# Patient Record
Sex: FEMALE | Race: BLACK OR AFRICAN AMERICAN | NOT HISPANIC OR LATINO | Employment: FULL TIME | URBAN - METROPOLITAN AREA
[De-identification: names, ages, dates, MRNs, and addresses within clinical notes are randomized per-mention and may not be internally consistent; named-entity substitution may affect disease eponyms.]

---

## 2017-08-14 ENCOUNTER — NURSE TRIAGE (OUTPATIENT)
Dept: ADMINISTRATIVE | Facility: CLINIC | Age: 47
End: 2017-08-14

## 2017-08-14 ENCOUNTER — HOSPITAL ENCOUNTER (EMERGENCY)
Facility: HOSPITAL | Age: 47
Discharge: HOME | End: 2017-08-15
Attending: EMERGENCY MEDICINE | Admitting: EMERGENCY MEDICINE
Payer: COMMERCIAL

## 2017-08-14 DIAGNOSIS — K57.92 DIVERTICULITIS OF INTESTINE, UNSPECIFIED BLEEDING STATUS, UNSPECIFIED COMPLICATION STATUS, UNSPECIFIED PART OF INTESTINAL TRACT: Primary | ICD-10-CM

## 2017-08-14 DIAGNOSIS — R10.9 FLANK PAIN: ICD-10-CM

## 2017-08-14 LAB
B-HCG UR QL: NEGATIVE
BACTERIA #/AREA URNS AUTO: ABNORMAL /HPF
BASOPHILS # BLD AUTO: 0.01 K/UL
BASOPHILS NFR BLD: 0.1 %
BILIRUB UR QL STRIP: NEGATIVE
CLARITY UR REFRACT.AUTO: ABNORMAL
COLOR UR AUTO: YELLOW
CTP QC/QA: YES
DIFFERENTIAL METHOD: ABNORMAL
EOSINOPHIL # BLD AUTO: 0 K/UL
EOSINOPHIL NFR BLD: 0.2 %
ERYTHROCYTE [DISTWIDTH] IN BLOOD BY AUTOMATED COUNT: 15.4 %
GLUCOSE UR QL STRIP: NEGATIVE
HCT VFR BLD AUTO: 40.9 %
HGB BLD-MCNC: 13.3 G/DL
HGB UR QL STRIP: NEGATIVE
KETONES UR QL STRIP: NEGATIVE
LEUKOCYTE ESTERASE UR QL STRIP: ABNORMAL
LYMPHOCYTES # BLD AUTO: 1.5 K/UL
LYMPHOCYTES NFR BLD: 7.7 %
MCH RBC QN AUTO: 28.4 PG
MCHC RBC AUTO-ENTMCNC: 32.5 G/DL
MCV RBC AUTO: 87 FL
MICROSCOPIC COMMENT: ABNORMAL
MONOCYTES # BLD AUTO: 1.1 K/UL
MONOCYTES NFR BLD: 5.5 %
NEUTROPHILS # BLD AUTO: 17 K/UL
NEUTROPHILS NFR BLD: 86.2 %
NITRITE UR QL STRIP: NEGATIVE
PH UR STRIP: 5 [PH] (ref 5–8)
PLATELET # BLD AUTO: 264 K/UL
PMV BLD AUTO: 8.7 FL
PROT UR QL STRIP: NEGATIVE
RBC # BLD AUTO: 4.68 M/UL
RBC #/AREA URNS AUTO: 1 /HPF (ref 0–4)
SP GR UR STRIP: 1.02 (ref 1–1.03)
SQUAMOUS #/AREA URNS AUTO: 8 /HPF
URN SPEC COLLECT METH UR: ABNORMAL
UROBILINOGEN UR STRIP-ACNC: NEGATIVE EU/DL
WBC # BLD AUTO: 19.65 K/UL
WBC #/AREA URNS AUTO: 21 /HPF (ref 0–5)

## 2017-08-14 PROCEDURE — 96374 THER/PROPH/DIAG INJ IV PUSH: CPT

## 2017-08-14 PROCEDURE — 96375 TX/PRO/DX INJ NEW DRUG ADDON: CPT

## 2017-08-14 PROCEDURE — 25000003 PHARM REV CODE 250: Performed by: EMERGENCY MEDICINE

## 2017-08-14 PROCEDURE — 81001 URINALYSIS AUTO W/SCOPE: CPT

## 2017-08-14 PROCEDURE — 63600175 PHARM REV CODE 636 W HCPCS: Performed by: EMERGENCY MEDICINE

## 2017-08-14 PROCEDURE — 99285 EMERGENCY DEPT VISIT HI MDM: CPT | Mod: ,,, | Performed by: EMERGENCY MEDICINE

## 2017-08-14 PROCEDURE — 81025 URINE PREGNANCY TEST: CPT | Performed by: EMERGENCY MEDICINE

## 2017-08-14 PROCEDURE — 85025 COMPLETE CBC W/AUTO DIFF WBC: CPT

## 2017-08-14 PROCEDURE — 96361 HYDRATE IV INFUSION ADD-ON: CPT

## 2017-08-14 PROCEDURE — P9612 CATHETERIZE FOR URINE SPEC: HCPCS

## 2017-08-14 PROCEDURE — 99284 EMERGENCY DEPT VISIT MOD MDM: CPT | Mod: 25

## 2017-08-14 RX ORDER — CIPROFLOXACIN 500 MG/1
500 TABLET ORAL
Status: COMPLETED | OUTPATIENT
Start: 2017-08-14 | End: 2017-08-15

## 2017-08-14 RX ORDER — HYDROMORPHONE HYDROCHLORIDE 1 MG/ML
1 INJECTION, SOLUTION INTRAMUSCULAR; INTRAVENOUS; SUBCUTANEOUS
Status: COMPLETED | OUTPATIENT
Start: 2017-08-14 | End: 2017-08-14

## 2017-08-14 RX ORDER — METRONIDAZOLE 500 MG/1
500 TABLET ORAL
Status: COMPLETED | OUTPATIENT
Start: 2017-08-14 | End: 2017-08-15

## 2017-08-14 RX ORDER — ONDANSETRON 2 MG/ML
4 INJECTION INTRAMUSCULAR; INTRAVENOUS
Status: COMPLETED | OUTPATIENT
Start: 2017-08-14 | End: 2017-08-14

## 2017-08-14 RX ORDER — KETOROLAC TROMETHAMINE 30 MG/ML
15 INJECTION, SOLUTION INTRAMUSCULAR; INTRAVENOUS
Status: COMPLETED | OUTPATIENT
Start: 2017-08-14 | End: 2017-08-14

## 2017-08-14 RX ADMIN — KETOROLAC TROMETHAMINE 15 MG: 30 INJECTION, SOLUTION INTRAMUSCULAR at 10:08

## 2017-08-14 RX ADMIN — ONDANSETRON 4 MG: 2 INJECTION INTRAMUSCULAR; INTRAVENOUS at 10:08

## 2017-08-14 RX ADMIN — HYDROMORPHONE HYDROCHLORIDE 1 MG: 1 INJECTION, SOLUTION INTRAMUSCULAR; INTRAVENOUS; SUBCUTANEOUS at 10:08

## 2017-08-14 RX ADMIN — SODIUM CHLORIDE 1000 ML: 0.9 INJECTION, SOLUTION INTRAVENOUS at 10:08

## 2017-08-14 NOTE — TELEPHONE ENCOUNTER
Reason for Disposition   No answer.  First attempt to contact caller.  Follow-up call scheduled within 15 minutes.   Message left on identified answering machine.   Second attempt to contact family AND no contact made.  Answering service notified.    Protocols used: ST NO CONTACT OR DUPLICATE CONTACT CALL-A-AH

## 2017-08-15 ENCOUNTER — TELEPHONE (OUTPATIENT)
Dept: OBSTETRICS AND GYNECOLOGY | Facility: CLINIC | Age: 47
End: 2017-08-15

## 2017-08-15 VITALS
HEIGHT: 64 IN | SYSTOLIC BLOOD PRESSURE: 121 MMHG | DIASTOLIC BLOOD PRESSURE: 69 MMHG | HEART RATE: 87 BPM | TEMPERATURE: 98 F | BODY MASS INDEX: 36.7 KG/M2 | RESPIRATION RATE: 18 BRPM | WEIGHT: 215 LBS | OXYGEN SATURATION: 100 %

## 2017-08-15 LAB
ALBUMIN SERPL BCP-MCNC: 3.3 G/DL
ALP SERPL-CCNC: 60 U/L
ALT SERPL W/O P-5'-P-CCNC: 14 U/L
ANION GAP SERPL CALC-SCNC: 10 MMOL/L
AST SERPL-CCNC: 14 U/L
BILIRUB SERPL-MCNC: 0.4 MG/DL
BUN SERPL-MCNC: 17 MG/DL
CALCIUM SERPL-MCNC: 8.4 MG/DL
CHLORIDE SERPL-SCNC: 103 MMOL/L
CO2 SERPL-SCNC: 24 MMOL/L
CREAT SERPL-MCNC: 0.7 MG/DL
EST. GFR  (AFRICAN AMERICAN): >60 ML/MIN/1.73 M^2
EST. GFR  (NON AFRICAN AMERICAN): >60 ML/MIN/1.73 M^2
GLUCOSE SERPL-MCNC: 98 MG/DL
POTASSIUM SERPL-SCNC: 3.5 MMOL/L
PROT SERPL-MCNC: 6.7 G/DL
SODIUM SERPL-SCNC: 137 MMOL/L

## 2017-08-15 PROCEDURE — 80053 COMPREHEN METABOLIC PANEL: CPT

## 2017-08-15 PROCEDURE — 25000003 PHARM REV CODE 250: Performed by: EMERGENCY MEDICINE

## 2017-08-15 RX ORDER — ONDANSETRON 4 MG/1
4 TABLET, FILM COATED ORAL EVERY 8 HOURS PRN
Qty: 12 TABLET | Refills: 0 | Status: SHIPPED | OUTPATIENT
Start: 2017-08-15 | End: 2017-08-17 | Stop reason: SDUPTHER

## 2017-08-15 RX ORDER — METRONIDAZOLE 500 MG/1
500 TABLET ORAL 3 TIMES DAILY
Qty: 30 TABLET | Refills: 0 | Status: SHIPPED | OUTPATIENT
Start: 2017-08-15 | End: 2017-08-25

## 2017-08-15 RX ORDER — CIPROFLOXACIN 500 MG/1
500 TABLET ORAL 2 TIMES DAILY
Qty: 20 TABLET | Refills: 0 | Status: SHIPPED | OUTPATIENT
Start: 2017-08-15 | End: 2017-08-25

## 2017-08-15 RX ORDER — HYDROCODONE BITARTRATE AND ACETAMINOPHEN 5; 325 MG/1; MG/1
1 TABLET ORAL EVERY 4 HOURS PRN
Qty: 18 TABLET | Refills: 0 | Status: SHIPPED | OUTPATIENT
Start: 2017-08-15 | End: 2017-11-30

## 2017-08-15 RX ADMIN — CIPROFLOXACIN HYDROCHLORIDE 500 MG: 500 TABLET, FILM COATED ORAL at 12:08

## 2017-08-15 RX ADMIN — METRONIDAZOLE 500 MG: 500 TABLET ORAL at 12:08

## 2017-08-15 NOTE — ED PROVIDER NOTES
Encounter Date: 8/14/2017    SCRIBE #1 NOTE: I, Miguelina Pink, am scribing for, and in the presence of, Dr. Huang.       History     Chief Complaint   Patient presents with    Abdominal Pain     LRQ abd pain whcih began this morning and became much worse around 5:45 tonight. Denies NVD at all today.        The patient is a 47 y.o. female with hx of: HTN and back pain that presents to the ED for evaluation of right sided lower abdominal pain radiating into the right lower back that began earlier today. Pain is worsened with movement. She states she was driving home from work when the pain began. She admits to chills and mild nausea. Denies vomiting, diarrhea, hematuria, dysuria, vaginal bleeding or discharge. Patient is unsure if she had a fever. She denies any abd surgeries in the past. Denies having similar symptoms in the past. LNMP was a week ago. No further concerns or complaints at this time.        The history is provided by the patient and medical records.     Review of patient's allergies indicates:  No Known Allergies  Past Medical History:   Diagnosis Date    Back pain     Hypertension      Past Surgical History:   Procedure Laterality Date    breast augmentation      DILATION AND CURETTAGE OF UTERUS       Family History   Problem Relation Age of Onset    Colon cancer Paternal Grandfather      Social History   Substance Use Topics    Smoking status: Never Smoker    Smokeless tobacco: Never Used    Alcohol use No     Review of Systems   Constitutional: Positive for chills.   HENT: Negative for sore throat.    Respiratory: Negative for shortness of breath.    Cardiovascular: Negative for chest pain.   Gastrointestinal: Positive for abdominal pain and nausea. Negative for diarrhea and vomiting.   Genitourinary: Positive for flank pain. Negative for dysuria, hematuria, vaginal bleeding and vaginal discharge.   Musculoskeletal: Negative for gait problem.   Skin: Negative for rash.   Neurological:  Negative for headaches.   Psychiatric/Behavioral: Negative for confusion.       Physical Exam     Initial Vitals [08/14/17 1859]   BP Pulse Resp Temp SpO2   (!) 145/82 95 18 98.3 °F (36.8 °C) 98 %      MAP       103         Physical Exam    Nursing note and vitals reviewed.  Constitutional: She appears well-developed and well-nourished. She appears distressed (moderate distress).   HENT:   Head: Normocephalic and atraumatic.   Neck: Normal range of motion.   Cardiovascular: Normal rate and regular rhythm. Exam reveals no gallop and no friction rub.    No murmur heard.  Pulmonary/Chest: Breath sounds normal. No respiratory distress. She has no wheezes. She has no rhonchi. She has no rales.   Abdominal: There is tenderness.   Right CVA tenderness. Moderate RLQ tenderness to palpation.   Musculoskeletal: Normal range of motion.   Neurological: She is alert and oriented to person, place, and time.   Skin: Skin is warm and dry.         ED Course   Procedures  Labs Reviewed   CBC W/ AUTO DIFFERENTIAL - Abnormal; Notable for the following:        Result Value    WBC 19.65 (*)     RDW 15.4 (*)     MPV 8.7 (*)     Gran # 17.0 (*)     Mono # 1.1 (*)     Gran% 86.2 (*)     Lymph% 7.7 (*)     All other components within normal limits    Narrative:     grayx is not on ice   URINALYSIS - Abnormal; Notable for the following:     Appearance, UA Cloudy (*)     Leukocytes, UA 3+ (*)     All other components within normal limits    Narrative:     urnx is GRAY top   URINALYSIS MICROSCOPIC - Abnormal; Notable for the following:     WBC, UA 21 (*)     Bacteria, UA Few (*)     All other components within normal limits    Narrative:     urnx is GRAY top   COMPREHENSIVE METABOLIC PANEL - Abnormal; Notable for the following:     Calcium 8.4 (*)     Albumin 3.3 (*)     All other components within normal limits   POCT URINE PREGNANCY          X-Rays:   Independently Interpreted Readings:   Abdomen: CT renal stone study: Sigmoid diverticulitis.      Medical Decision Making:   History:   Old Medical Records: I decided to obtain old medical records.  Initial Assessment:   Patient with right lower quadrant and flank pain. Will perform abdominal labs and CT scan and re-evaluate.  Differential Diagnosis:   Kidney stone, pyelonephritis, ovarian torsion, and appendicitis.  Clinical Tests:   Lab Tests: Ordered and Reviewed  Radiological Study: Ordered and Reviewed            Scribe Attestation:   Scribe #1: I performed the above scribed service and the documentation accurately describes the services I performed. I attest to the accuracy of the note.    Attending Attestation:           Physician Attestation for Scribe:  Physician Attestation Statement for Scribe #1: I, Dr. Huang, reviewed documentation, as scribed by Miguelina Pink in my presence, and it is both accurate and complete.         Attending ED Notes:   Patient found to have diverticulitis. Will discharge with Cipro and Flagyl.          ED Course     Clinical Impression:   The primary encounter diagnosis was Diverticulitis of intestine, unspecified bleeding status, unspecified complication status, unspecified part of intestinal tract. A diagnosis of Flank pain was also pertinent to this visit.    Disposition:   Disposition: Discharged  Condition: Stable                        Abhijeet Huang III, MD  08/15/17 8280

## 2017-08-15 NOTE — TELEPHONE ENCOUNTER
----- Message from Frances Felix sent at 8/14/2017  5:19 PM CDT -----  Contact: self, [phone]  Patient called in requesting to have medication prescribed for a UTI.  Please advise.

## 2017-08-15 NOTE — ED NOTES
Pain:abdominal pain that radiates tot he back 8/10 stabbing pain    Psychosocial: Patient is calm and cooperative. Patients insight and judgement are appropriate to situation. Appears clean, well maintained, with clothing appropriate to environment. No evidence of delusions, hallucinations, or psychosis.    Neuro: Eyes open spontaneously. Awake, alert, oriented x 4. Speech clear and appropriate. Tolerating saliva secretions well. Able to follow commands, demonstrating ability to actively and appropriately communicate within context of current conversation. Symmetrical facial muscles. Moving all extremities well with no noted weakness. Adequate muscle tone present. Movement is purposeful. No evidence of impaired sensation. Responds to external stimuli with appropriate reflexes.     Airway: Bilateral chest rise and fall. RR regular and non-labored. Air entry patent and clear x 5 lobes of the lungs. No crepitus or subcutaneous emphysema noted on palpation.     Circulatory: Skin warm, dry, and pink. Apical and radial pulses strong and regular. Capillary refill/skin blanching less than 3 seconds to distal of 4 extremities. Placed on CM in NSR without ectopy.    Abdomen: Abdomen obese, soft and non-distended. Positive normo-active bowel sounds x 4 quadrants.     Urinary: Patient reports routine urination without pain, frequency, or urgency. Voids independently. Reports urine appears lexy/yellow in color.    Extremities: No redness, heat, swelling, deformity, or pain.     Skin: Intact with no bruising/discolorations noted.

## 2017-08-15 NOTE — ED TRIAGE NOTES
47 year old female pt presents tot the ed with complaints of abdominal pain that radiates to the back  X 1 day. Pt denies any  Nausea, sob or chest pain. Pt denies any hx of abdominal surgerys or utis. Pt is awake alert and oriented x 4. Pt denies any blood in urine or stool .

## 2017-08-17 ENCOUNTER — OFFICE VISIT (OUTPATIENT)
Dept: INTERNAL MEDICINE | Facility: CLINIC | Age: 47
End: 2017-08-17
Payer: COMMERCIAL

## 2017-08-17 VITALS
WEIGHT: 216.06 LBS | BODY MASS INDEX: 39.76 KG/M2 | SYSTOLIC BLOOD PRESSURE: 121 MMHG | HEART RATE: 82 BPM | HEIGHT: 62 IN | DIASTOLIC BLOOD PRESSURE: 79 MMHG

## 2017-08-17 DIAGNOSIS — I10 ESSENTIAL HYPERTENSION: Primary | ICD-10-CM

## 2017-08-17 DIAGNOSIS — R11.0 NAUSEA: ICD-10-CM

## 2017-08-17 DIAGNOSIS — K57.90 DIVERTICULOSIS OF INTESTINE WITHOUT BLEEDING, UNSPECIFIED INTESTINAL TRACT LOCATION: ICD-10-CM

## 2017-08-17 DIAGNOSIS — Z00.00 HEALTHCARE MAINTENANCE: ICD-10-CM

## 2017-08-17 DIAGNOSIS — K59.00 CONSTIPATION, UNSPECIFIED CONSTIPATION TYPE: ICD-10-CM

## 2017-08-17 PROCEDURE — 3008F BODY MASS INDEX DOCD: CPT | Mod: S$GLB,,, | Performed by: STUDENT IN AN ORGANIZED HEALTH CARE EDUCATION/TRAINING PROGRAM

## 2017-08-17 PROCEDURE — 99203 OFFICE O/P NEW LOW 30 MIN: CPT | Mod: S$GLB,,, | Performed by: STUDENT IN AN ORGANIZED HEALTH CARE EDUCATION/TRAINING PROGRAM

## 2017-08-17 PROCEDURE — 99999 PR PBB SHADOW E&M-EST. PATIENT-LVL IV: CPT | Mod: PBBFAC,,, | Performed by: STUDENT IN AN ORGANIZED HEALTH CARE EDUCATION/TRAINING PROGRAM

## 2017-08-17 RX ORDER — ONDANSETRON 4 MG/1
4 TABLET, FILM COATED ORAL EVERY 6 HOURS PRN
Qty: 24 TABLET | Refills: 0 | Status: SHIPPED | OUTPATIENT
Start: 2017-08-17 | End: 2017-08-21 | Stop reason: SDUPTHER

## 2017-08-17 RX ORDER — CELECOXIB 200 MG/1
200 CAPSULE ORAL DAILY
COMMUNITY
Start: 2017-08-15 | End: 2017-11-06

## 2017-08-17 RX ORDER — ALBUTEROL SULFATE 90 UG/1
90 AEROSOL, METERED RESPIRATORY (INHALATION)
COMMUNITY
Start: 2017-07-24 | End: 2018-02-12 | Stop reason: SDUPTHER

## 2017-08-17 RX ORDER — POLYETHYLENE GLYCOL 3350 17 G/17G
17 POWDER, FOR SOLUTION ORAL DAILY
Qty: 238 G | Refills: 2 | Status: SHIPPED | OUTPATIENT
Start: 2017-08-17 | End: 2017-11-30

## 2017-08-17 NOTE — PATIENT INSTRUCTIONS
Diverticulitis    Some people get pouches along the wall of the colon as they get older. The pouches, called diverticuli, usually cause no symptoms. If the pouches become blocked, you can get an infection. This infection is called diverticulitis. It causes pain in your lower abdomen and fever. If not treated, it can become a serious condition, causing an abscess to form inside the pouch. The abscess may block the intestinal tract even or rupture, spreading infection throughout the abdomen.  When treatment is started early, oral antibiotics alone may be enough to cure diverticulitis. This method is tried first. But, if you don't improve or if your condition gets worse while using oral antibiotics, you may need to be admitted to the hospital for IV antibiotics. Severe cases may require surgery.  Home care  The following guidelines will help you care for yourself at home:  · During the acute illness, rest and follow your healthcare provider's instructions about diet. Sometimes you will need to follow a clear liquid diet to rest your bowel. Once your symptoms are better, you may be told to follow a low-fiber diet for some time. Include foods like:  ¨ Flake cereal, mashed potatoes, pancakes, waffles, pasta, white bread, rice, applesauce, bananas, eggs, fish, poultry, tofu, and cooked soft vegetables  · Take antibiotics exactly as instructed. Don't miss any doses or stop taking the medication, even if you feel better.  · Monitor your temperature and tell your healthcare provider if you have rising temperatures.  Preventing future attacks  Once you have an episode of diverticulitis, you are at risk for having it again. After you have recovered from this episode, you may be able to lower your risk by eating a high-fiber diet (20 gm/day to 35 gm/day of fiber). This cleans out the colon pouches that already exist and may prevent new ones from forming. Foods high in fiber include fresh fruits and edible peelings, raw or  lightly cooked vegetables, whole grain cereals and breads, dried beans and peas, and bran.  Other steps that can help prevent future attacks include:  · Take your medicines, such as antibiotics, as your healthcare provider says.  · Drink 6 to 8 glasses of water every day, unless told otherwise.  · Use a heating pad or hot water bottle to help abdominal cramping or pain.  · Begin an exercise program. Ask your healthcare provider how to get started. You can benefit from simple activities such as walking or gardening.  · Treat diarrhea with a bland diet. Start with liquids only; then slowly add fiber over time.  · Watch for changes in your bowel movements (constipation to diarrhea). Avoid constipation by eating a high fiber diet and taking a stool softener if needed.  · Get plenty of rest and sleep.  Follow-up care  Follow up with your healthcare provider as advised or sooner if you are not getting better in the next 2 days.  When to seek medical advice  Call your healthcare provider right away if any of these occur:  · Fever of 100.4°F (38°C) or higher, or as directed by your healthcare provider  · Repeated vomiting or swelling of the abdomen  · Weakness, dizziness, light-headedness  · Pain in your abdomen that gets worse, severe, or spreads to your back  · Pain that moves to the right lower abdomen  · Rectal bleeding (stools that are red, black or maroon color)  · Unexpected vaginal bleeding  Date Last Reviewed: 9/1/2016  © 7399-9871 [employer]. [address] [place] [address], [place] [address]. All rights reserved. This information is not intended as a substitute for professional medical care. Always follow your healthcare professional's instructions.

## 2017-08-17 NOTE — PROGRESS NOTES
Internal Medicine Clinic Note  08/17/2017    Subjective:       Patient ID: Nadine M Claude is a 47 y.o. female being seen to establish care    Chief Complaint: Establish Care    HPI   Pt is a 48 y/o F with HTN, bronchitis, heel spurs, diverticulosis (w/ acute diverticulitis on 8/14/17) who presents to establish care.   She originally presented to ER on 8/14 with acute RLQ abdominal pain radiating to the back. On CT renal stone study, she was noted to have sigmoid diverticulitis. She was sent home with cipro, flagyl, zofran for nausea, norco 5-325 for pain control. Her abdominal pain has resolved today, although she has taken 1 dose of norco today.   Of note, she complains of constipation and nausea. The constipation - last BM on Sunday. Tried some colace yesterday but on BM so far. No h/o constipation in the past before diverticulosis episode.  Nausea - onset today. No emesis yet. Last zofran dose taken yesterday afternoon. She didn't take any last night b/c didn't need. She has been able to tolerate food and has eaten jellos, and half a banana for lunch.  Pt is morbidly obese and does not exercise or eat well. Will need be addressed at another office visit.   Of note, she has a h/o depression for which she took Bupropion in the past.   She also has a h/o heel spurs for which a Podiatrist has prescribed celebrex (pt hasn't picked it up yet) after she failed a trial of methylprednisolone.   She has chronic sleep disturbance for which she takes Ambien 5mg daily.  Her prior PCP was Dr. Mirza Lind in [facility]. Pt wishes to transition her medical care to [facility].     Past Medical History:   Diagnosis Date    Back pain     Hypertension      Past Surgical History:   Procedure Laterality Date    breast augmentation      DILATION AND CURETTAGE OF UTERUS       Family History   Problem Relation Age of Onset    Colon cancer Paternal Grandfather     Diabetes Mother     Heart disease Father      Patient's Medications   New  Prescriptions    POLYETHYLENE GLYCOL (GLYCOLAX) 17 GRAM/DOSE POWDER    Take 17 g by mouth once daily.   Previous Medications    ASPIRIN-ACETAMINOPHEN-CAFFEINE 250-250-65 MG (EXCEDRIN MIGRAINE) 250-250-65 MG PER TABLET    Take 1 tablet by mouth every 6 (six) hours as needed for Pain.    CELECOXIB (CELEBREX) 200 MG CAPSULE    Take 200 mg by mouth once daily.    CIPROFLOXACIN HCL (CIPRO) 500 MG TABLET    Take 1 tablet (500 mg total) by mouth 2 (two) times daily.    HYDROCODONE-ACETAMINOPHEN 5-325MG (NORCO) 5-325 MG PER TABLET    Take 1 tablet by mouth every 4 (four) hours as needed for Pain.    LOSARTAN-HYDROCHLOROTHIAZIDE 50-12.5 MG (HYZAAR) 50-12.5 MG PER TABLET    Take 1 tablet by mouth once daily.      METRONIDAZOLE (FLAGYL) 500 MG TABLET    Take 1 tablet (500 mg total) by mouth 3 (three) times daily.    PROAIR HFA 90 MCG/ACTUATION INHALER    Inhale 90 mcg into the lungs as needed.    ZOLPIDEM (AMBIEN) 10 MG TAB    Take 5 mg by mouth nightly as needed.     Modified Medications    Modified Medication Previous Medication    ONDANSETRON (ZOFRAN) 4 MG TABLET ondansetron (ZOFRAN) 4 MG tablet       Take 1 tablet (4 mg total) by mouth every 6 (six) hours as needed for Nausea.    Take 1 tablet (4 mg total) by mouth every 8 (eight) hours as needed.   Discontinued Medications    No medications on file     Patient Active Problem List    Diagnosis Date Noted    Diverticulosis 08/17/2017    Essential hypertension 08/17/2017     Review of Systems   Constitutional: Negative for chills, diaphoresis, fever and malaise/fatigue.   Eyes: Negative for blurred vision.   Respiratory: Negative for cough and shortness of breath.    Cardiovascular: Positive for leg swelling (intermittent; resolves with leg elevation). Negative for chest pain and palpitations.   Gastrointestinal: Positive for constipation and nausea. Negative for abdominal pain, diarrhea, heartburn and vomiting.   Genitourinary: Negative for dysuria.   Musculoskeletal:  "Negative for myalgias.   Neurological: Negative for headaches.     Objective:      /79   Pulse 82   Ht 5' 2" (1.575 m)   Wt 98 kg (216 lb 0.8 oz)   BMI 39.52 kg/m²   Body mass index is 39.52 kg/m².    Physical Exam   Constitutional: She is oriented to person, place, and time. She appears well-developed and well-nourished.   HENT:   Head: Normocephalic and atraumatic.   Eyes: Pupils are equal, round, and reactive to light.   Cardiovascular: Normal rate, regular rhythm, normal heart sounds and intact distal pulses.    No murmur heard.  Pulmonary/Chest: Effort normal and breath sounds normal. No respiratory distress.   Abdominal: Soft. Bowel sounds are normal. She exhibits distension. She exhibits no mass. There is no tenderness. There is no guarding.   Musculoskeletal: She exhibits no edema.   Neurological: She is alert and oriented to person, place, and time.   Skin: Skin is warm and dry. Capillary refill takes less than 2 seconds.   Psychiatric: She has a normal mood and affect. Her behavior is normal. Judgment and thought content normal.   Vitals reviewed.      Assessment:         1. Essential hypertension     2. Diverticulosis of intestine without bleeding, unspecified intestinal tract location  ondansetron (ZOFRAN) 4 MG tablet   3. Nausea     4. Constipation, unspecified constipation type  polyethylene glycol (GLYCOLAX) 17 gram/dose powder   5. Healthcare maintenance  Lipid panel    Mammo Digital Diagnostic Bilat with CAD         Plan:     Nadine M Claude is a 47 y.o. female being seen to establish care    1. Essential hypertension  - continue hyzaar at current dose  - BP controlled. Goal < 150/90    2. Diverticulosis  - pain has resolved. Advised pt to stop taking norco 5-325. May take NSAID for analgesia  - patient education provided on preventing further relapses  - will continue taking cipro & flagyl as prescribed. If she continues to feel bad, may consider changing to augmentin next week.    3. " Nausea  - likely 2/2 constipation  - refilled prescription for zofran 4mg q6h prn for nausea    4. Constipation - last BM on 8/16  - likely exacerbated by decreased oral intake and frequent opioid use (norco 5-325mg q4h) for abdominal pain  - prescribed miralax   - advised fiber intake to promote regular BMs 1/day    5. Healthcare maintenance  - lipid pane; future  - MMG ordered    RTC in 1 week to follow up for nausea, constipation, abdominal pain     Patient seen and plan of care discussed with Dr. Michelle Mills MD  Internal Medicine, PGY-2  [phone]

## 2017-08-18 NOTE — PROGRESS NOTES
I have personally interviewed the patient, examined the patient and I agree with the resident's exam, assessment and plan.   Her  accompanies her to this visit.  On my exam she is tender without rebound tenderness in her lower abdomen.  Oddly, her tenderness on exam seems to be more prominent in the right lower quadrant of the abdomen, than it is on the left side of her abdomen.  She looks mildly ill, her temperature is 98.4, her lips are slightly dry and she complains of constipation and very severe nausea.  Hopefully, the constipation and nausea are consequence of her hydrocodone use since her hospital discharge from the emergency room 3 days ago.  She last took a hydrocodone tablet this morning.  She is asked to stop hydrocodone.  She is instructed to return to the emergency room should she develop vomiting, fever or worsening abdominal pain.  Otherwise, if her nausea does not subside or she has other less worrisome symptoms she is instructed to call the office to discuss her clinical status.  In the meantime, she says that the sound of having some mashed potatoes and fish sounds pretty good and she will continue on Cipro twice daily metronidazole 3 times a day with Zofran if needed for nausea.  I think Dr. Mills's idea of using MiraLAX 1 tablespoon in 8-16 ounces of any noncarbonated drink of her choice, 2-3 times daily until her stool is formed but soft with no straining on defecation will likely work well.    Long-term, she would benefit from fiber supplementation so that she has a complete elimination on a daily basis.  We did not discuss colonoscopy, however when she recovers fully from this illness, she should consider this important screening test for the early detection of colon cancer even though she has no family history of colon cancer.

## 2017-08-21 ENCOUNTER — OFFICE VISIT (OUTPATIENT)
Dept: INTERNAL MEDICINE | Facility: CLINIC | Age: 47
End: 2017-08-21
Payer: COMMERCIAL

## 2017-08-21 VITALS
WEIGHT: 216.25 LBS | HEIGHT: 64 IN | SYSTOLIC BLOOD PRESSURE: 117 MMHG | DIASTOLIC BLOOD PRESSURE: 65 MMHG | HEART RATE: 95 BPM | BODY MASS INDEX: 36.92 KG/M2

## 2017-08-21 DIAGNOSIS — K21.9 GASTROESOPHAGEAL REFLUX DISEASE WITHOUT ESOPHAGITIS: ICD-10-CM

## 2017-08-21 DIAGNOSIS — E66.9 OBESITY (BMI 30-39.9): ICD-10-CM

## 2017-08-21 DIAGNOSIS — I10 ESSENTIAL HYPERTENSION: ICD-10-CM

## 2017-08-21 DIAGNOSIS — K57.90 DIVERTICULOSIS OF INTESTINE WITHOUT BLEEDING, UNSPECIFIED INTESTINAL TRACT LOCATION: Primary | ICD-10-CM

## 2017-08-21 PROCEDURE — 99999 PR PBB SHADOW E&M-EST. PATIENT-LVL III: CPT | Mod: PBBFAC,,, | Performed by: STUDENT IN AN ORGANIZED HEALTH CARE EDUCATION/TRAINING PROGRAM

## 2017-08-21 PROCEDURE — 99213 OFFICE O/P EST LOW 20 MIN: CPT | Mod: S$GLB,,, | Performed by: STUDENT IN AN ORGANIZED HEALTH CARE EDUCATION/TRAINING PROGRAM

## 2017-08-21 PROCEDURE — 3008F BODY MASS INDEX DOCD: CPT | Mod: S$GLB,,, | Performed by: STUDENT IN AN ORGANIZED HEALTH CARE EDUCATION/TRAINING PROGRAM

## 2017-08-21 RX ORDER — ONDANSETRON 4 MG/1
4 TABLET, FILM COATED ORAL EVERY 6 HOURS PRN
Qty: 10 TABLET | Refills: 0 | Status: SHIPPED | OUTPATIENT
Start: 2017-08-21 | End: 2017-11-30

## 2017-08-21 NOTE — PATIENT INSTRUCTIONS
Exercise 30-40 mins 3-4 times per week    [employer] Fitness Pal OR Lose IT  - record food     Nexium - for reflux

## 2017-08-21 NOTE — PROGRESS NOTES
Internal Medicine Clinic Note  08/21/2017    Subjective:       Patient ID: Nadine M Claude is a 47 y.o. female being seen for follow up    Chief Complaint: Follow-up    HPI   Pt is a 48 y/o F with HTN, bronchitis, heel spurs, diverticulosis (w/ acute diverticulitis on 8/14/17) who for follow up for her diverticulitis.   Pt originally presented to ER on 8/14 with acute RLQ abdominal pain radiating to the back. On CT renal stone study, she was noted to have sigmoid diverticulitis. She was sent home with cipro, flagyl, zofran for nausea, norco 5-325 for pain control. On last clinic visit 8/17, she was noted to be in severe nausea and constipation. The constipation was secondary to the norco (scheduled q4h) and nausea secondary to constipation +/- antibiotics. Pt was advised to stop taking norco, and prescribed zofran and miralax for symptom resolution.     In clinic today, she denies any abdominal pain, constipation. She has mild nausea after she takes her antibiotics but has been taking 1 zofran/day for it. She is now having 2-3 loose stools during the day. Continues to take miralax.   Able to tolerate meals w/o issues.     Pt is morbidly obese and does not exercise or eat well. Has a membership at [email or web address]. Limitations to exercise : heel spurs. Can ride bike. Elliptical hurts knees. Encouraged pt to ride bike 30-40 mins 3-4 times/week.   For food - has been trying to lose weight before.    Has h/o depression for which she took Bupropion in the past.   Has h/o heel spurs for which a Podiatrist has prescribed celebrex (pt hasn't picked it up yet) after she failed a trial of methylprednisolone.   She has chronic sleep disturbance for which she takes Ambien 5mg daily.    Past Medical History:   Diagnosis Date    Back pain     Hypertension      Past Surgical History:   Procedure Laterality Date    breast augmentation      DILATION AND CURETTAGE OF UTERUS       Family History   Problem Relation Age of Onset     Colon cancer Paternal Grandfather     Diabetes Mother     Heart disease Father      Patient's Medications   New Prescriptions    No medications on file   Previous Medications    ASPIRIN-ACETAMINOPHEN-CAFFEINE 250-250-65 MG (EXCEDRIN MIGRAINE) 250-250-65 MG PER TABLET    Take 1 tablet by mouth every 6 (six) hours as needed for Pain.    CELECOXIB (CELEBREX) 200 MG CAPSULE    Take 200 mg by mouth once daily.    CIPROFLOXACIN HCL (CIPRO) 500 MG TABLET    Take 1 tablet (500 mg total) by mouth 2 (two) times daily.    HYDROCODONE-ACETAMINOPHEN 5-325MG (NORCO) 5-325 MG PER TABLET    Take 1 tablet by mouth every 4 (four) hours as needed for Pain.    LOSARTAN-HYDROCHLOROTHIAZIDE 50-12.5 MG (HYZAAR) 50-12.5 MG PER TABLET    Take 1 tablet by mouth once daily.      METRONIDAZOLE (FLAGYL) 500 MG TABLET    Take 1 tablet (500 mg total) by mouth 3 (three) times daily.    POLYETHYLENE GLYCOL (GLYCOLAX) 17 GRAM/DOSE POWDER    Take 17 g by mouth once daily.    PROAIR HFA 90 MCG/ACTUATION INHALER    Inhale 90 mcg into the lungs as needed.    ZOLPIDEM (AMBIEN) 10 MG TAB    Take 5 mg by mouth nightly as needed.     Modified Medications    Modified Medication Previous Medication    ONDANSETRON (ZOFRAN) 4 MG TABLET ondansetron (ZOFRAN) 4 MG tablet       Take 1 tablet (4 mg total) by mouth every 6 (six) hours as needed for Nausea.    Take 1 tablet (4 mg total) by mouth every 6 (six) hours as needed for Nausea.   Discontinued Medications    No medications on file     Patient Active Problem List    Diagnosis Date Noted    Gastroesophageal reflux disease 08/21/2017    Obesity (BMI 30-39.9) 08/21/2017    Diverticulosis 08/17/2017    Essential hypertension 08/17/2017     Review of Systems   Constitutional: Negative for chills, diaphoresis, fever and malaise/fatigue.   Eyes: Negative for blurred vision.   Respiratory: Negative for cough and shortness of breath.    Cardiovascular: Negative for chest pain, palpitations and leg swelling.  "  Gastrointestinal: Positive for diarrhea and nausea ( improved; s/p antibiotics). Negative for abdominal pain, constipation, heartburn and vomiting.   Genitourinary: Negative for dysuria.   Musculoskeletal: Negative for myalgias.   Neurological: Negative for headaches.     Objective:      /65   Pulse 95   Ht 5' 4" (1.626 m)   Wt 98.1 kg (216 lb 4.3 oz)   BMI 37.12 kg/m²   Body mass index is 37.12 kg/m².    Physical Exam   Constitutional: She is oriented to person, place, and time. She appears well-developed and well-nourished.   HENT:   Head: Normocephalic and atraumatic.   Eyes: Pupils are equal, round, and reactive to light.   Cardiovascular: Normal rate, regular rhythm, normal heart sounds and intact distal pulses.    No murmur heard.  Pulmonary/Chest: Effort normal and breath sounds normal. No respiratory distress.   Abdominal: Soft. Bowel sounds are normal. She exhibits no distension and no mass. There is no tenderness. There is no guarding.   Musculoskeletal: She exhibits no edema.   Neurological: She is alert and oriented to person, place, and time.   Skin: Skin is warm and dry. Capillary refill takes less than 2 seconds.   Psychiatric: She has a normal mood and affect. Her behavior is normal. Judgment and thought content normal.   Vitals reviewed.      Assessment:         1. Diverticulosis of intestine without bleeding, unspecified intestinal tract location  ondansetron (ZOFRAN) 4 MG tablet   2. Essential hypertension     3. Gastroesophageal reflux disease     4. Obesity (BMI 30-39.9)           Plan:     Nadine M Claude is a 47 y.o. female being seen to follow up for her diverticulitis    1. Diverticulosis  Nausea - resolved  Constipation - resolved. Pt having loose and less formed stools. Asked pt to take   - pain has resolved. Patient has stopped taking norco  - patient education reviewed. Pt understands diet to prevent further relapses  - will continue taking cipro & flagyl as prescribed to " complete x10D course. Nausea prn for antibiotics-associated nausea    2. Essential hypertension  - continue hyzaar at current dose  - BP controlled. Goal < 150/90    3. GERD  - trial of PPI: nexium 20mg OTC    4. Obesity   - discussed diet monitoring ([employer]itness Pal vs. Lose It).   Will work on BMR calculation & daily calorie intake req to lose weight at next visit.  - discussed exercise options. Pt will ride stationary bike 30-40 mins/day 3-4 times/week  - will follow up lipid panel and continue implementing lifestyle changes.     RTC in 6 months to follow up.     Patient seen and plan of care discussed with Dr. Amie Mills MD  Internal Medicine, PGY-2  [phone]

## 2017-08-23 NOTE — PROGRESS NOTES
I have personally taken the history and examined this patient and agree with the resident's note as stated above with the following thoughts:    Discussed diet and weight loss-- depending how that works- might be good idea to do [place] [employer] referral next time.

## 2017-08-31 ENCOUNTER — OFFICE VISIT (OUTPATIENT)
Dept: URGENT CARE | Facility: CLINIC | Age: 47
End: 2017-08-31
Payer: OTHER GOVERNMENT

## 2017-08-31 VITALS
WEIGHT: 212 LBS | HEART RATE: 91 BPM | TEMPERATURE: 97 F | SYSTOLIC BLOOD PRESSURE: 140 MMHG | BODY MASS INDEX: 36.19 KG/M2 | HEIGHT: 64 IN | OXYGEN SATURATION: 98 % | DIASTOLIC BLOOD PRESSURE: 98 MMHG

## 2017-08-31 DIAGNOSIS — S63.614A SPRAIN OF RIGHT RING FINGER, UNSPECIFIED SITE OF FINGER, INITIAL ENCOUNTER: ICD-10-CM

## 2017-08-31 DIAGNOSIS — M79.644 PAIN IN RIGHT FINGER(S): Primary | ICD-10-CM

## 2017-08-31 DIAGNOSIS — S63.616A SPRAIN OF RIGHT LITTLE FINGER, UNSPECIFIED SITE OF FINGER, INITIAL ENCOUNTER: ICD-10-CM

## 2017-08-31 PROCEDURE — 99203 OFFICE O/P NEW LOW 30 MIN: CPT | Mod: S$GLB,,, | Performed by: NURSE PRACTITIONER

## 2017-08-31 PROCEDURE — 3008F BODY MASS INDEX DOCD: CPT | Mod: S$GLB,,, | Performed by: NURSE PRACTITIONER

## 2017-08-31 NOTE — PROGRESS NOTES
Subjective:       Patient ID: Nadine M Claude is a 47 y.o. female.    Chief Complaint: Hand Pain     presents with new injury (doi 8/30): She reports while placing two heavy parcels on a counter top, her right 4th and 5th fingers got caught between the boxes causing her fingers to hyperextend when she put boxes down. She c/o pain in 4th and 5th fingers when she uses or bumps her hand 5/10 on pain scale.      Hand Pain    The incident occurred 12 to 24 hours ago. The incident occurred at work. The pain is present in the right fingers. The pain does not radiate. The pain is at a severity of 5/10. The pain is moderate. The pain has been intermittent since the incident. Pertinent negatives include no chest pain. The symptoms are aggravated by palpation.     Review of Systems   Constitution: Negative for chills and fever.   HENT: Negative for headaches and sore throat.    Eyes: Negative for blurred vision.   Cardiovascular: Negative for chest pain.   Respiratory: Negative for shortness of breath.    Skin: Negative for rash.   Musculoskeletal: Negative for back pain and joint pain.   Gastrointestinal: Negative for abdominal pain, diarrhea, nausea and vomiting.   Psychiatric/Behavioral: The patient is not nervous/anxious.    All other systems reviewed and are negative.      Objective:      Physical Exam   Constitutional: She is oriented to person, place, and time. She appears well-developed and well-nourished.   HENT:   Right Ear: External ear normal.   Left Ear: External ear normal.   Nose: Nose normal.   Eyes: Conjunctivae are normal.   Cardiovascular: Normal rate, regular rhythm, normal heart sounds and intact distal pulses.    Pulmonary/Chest: Effort normal and breath sounds normal.   Abdominal: Soft. Bowel sounds are normal.   Musculoskeletal: She exhibits tenderness.        Right hand: She exhibits tenderness and swelling. She exhibits normal capillary refill and no deformity.        Hands:  Neurological:  She is alert and oriented to person, place, and time.   Skin: Skin is warm and dry. Capillary refill takes less than 2 seconds.   Psychiatric: She has a normal mood and affect. Her behavior is normal.       Assessment:       1. Pain in right finger(s)    2. Sprain of right ring finger, unspecified site of finger, initial encounter    3. Sprain of right little finger, unspecified site of finger, initial encounter        Plan:            Patient Instructions: Elevated affected area, Apply ice 24-48 hours then apply heat/warm soaks, Use splint as directed, Attention not to aggravate affected area   Restrictions: Regular Duty    RTC 9/6/17  Pt takes Celebrex for unrelated issue.

## 2017-11-06 ENCOUNTER — LAB VISIT (OUTPATIENT)
Dept: LAB | Facility: HOSPITAL | Age: 47
End: 2017-11-06
Payer: COMMERCIAL

## 2017-11-06 ENCOUNTER — OFFICE VISIT (OUTPATIENT)
Dept: INTERNAL MEDICINE | Facility: CLINIC | Age: 47
End: 2017-11-06
Payer: COMMERCIAL

## 2017-11-06 VITALS
BODY MASS INDEX: 37.11 KG/M2 | HEIGHT: 64 IN | HEART RATE: 86 BPM | WEIGHT: 217.38 LBS | SYSTOLIC BLOOD PRESSURE: 112 MMHG | DIASTOLIC BLOOD PRESSURE: 68 MMHG

## 2017-11-06 DIAGNOSIS — Z01.818 PRE-OP EVALUATION: ICD-10-CM

## 2017-11-06 DIAGNOSIS — I10 ESSENTIAL HYPERTENSION: ICD-10-CM

## 2017-11-06 DIAGNOSIS — Z01.818 PRE-OP EVALUATION: Primary | ICD-10-CM

## 2017-11-06 LAB
ALBUMIN SERPL BCP-MCNC: 3.5 G/DL
ALP SERPL-CCNC: 73 U/L
ALT SERPL W/O P-5'-P-CCNC: 19 U/L
ANION GAP SERPL CALC-SCNC: 9 MMOL/L
AST SERPL-CCNC: 18 U/L
BASOPHILS # BLD AUTO: 0.02 K/UL
BASOPHILS NFR BLD: 0.2 %
BILIRUB SERPL-MCNC: 0.1 MG/DL
BILIRUB UR QL STRIP: NEGATIVE
BUN SERPL-MCNC: 17 MG/DL
CALCIUM SERPL-MCNC: 9.5 MG/DL
CHLORIDE SERPL-SCNC: 101 MMOL/L
CLARITY UR REFRACT.AUTO: CLEAR
CO2 SERPL-SCNC: 28 MMOL/L
COLOR UR AUTO: YELLOW
CREAT SERPL-MCNC: 0.8 MG/DL
DIFFERENTIAL METHOD: ABNORMAL
EOSINOPHIL # BLD AUTO: 0.1 K/UL
EOSINOPHIL NFR BLD: 1.2 %
ERYTHROCYTE [DISTWIDTH] IN BLOOD BY AUTOMATED COUNT: 15.5 %
EST. GFR  (AFRICAN AMERICAN): >60 ML/MIN/1.73 M^2
EST. GFR  (NON AFRICAN AMERICAN): >60 ML/MIN/1.73 M^2
GLUCOSE SERPL-MCNC: 83 MG/DL
GLUCOSE UR QL STRIP: NEGATIVE
HCT VFR BLD AUTO: 38.8 %
HGB BLD-MCNC: 12.3 G/DL
HGB UR QL STRIP: NEGATIVE
KETONES UR QL STRIP: NEGATIVE
LEUKOCYTE ESTERASE UR QL STRIP: ABNORMAL
LYMPHOCYTES # BLD AUTO: 3 K/UL
LYMPHOCYTES NFR BLD: 30.1 %
MCH RBC QN AUTO: 28 PG
MCHC RBC AUTO-ENTMCNC: 31.7 G/DL
MCV RBC AUTO: 88 FL
MICROSCOPIC COMMENT: NORMAL
MONOCYTES # BLD AUTO: 0.7 K/UL
MONOCYTES NFR BLD: 6.8 %
NEUTROPHILS # BLD AUTO: 6.1 K/UL
NEUTROPHILS NFR BLD: 61.2 %
NITRITE UR QL STRIP: NEGATIVE
NRBC BLD-RTO: 0 /100 WBC
PH UR STRIP: 5 [PH] (ref 5–8)
PLATELET # BLD AUTO: 369 K/UL
PMV BLD AUTO: 8.9 FL
POTASSIUM SERPL-SCNC: 3.9 MMOL/L
PROT SERPL-MCNC: 7.4 G/DL
PROT UR QL STRIP: NEGATIVE
RBC # BLD AUTO: 4.4 M/UL
RBC #/AREA URNS AUTO: 0 /HPF (ref 0–4)
SODIUM SERPL-SCNC: 138 MMOL/L
SP GR UR STRIP: 1.02 (ref 1–1.03)
SQUAMOUS #/AREA URNS AUTO: 1 /HPF
URN SPEC COLLECT METH UR: ABNORMAL
UROBILINOGEN UR STRIP-ACNC: NEGATIVE EU/DL
WBC # BLD AUTO: 10.01 K/UL
WBC #/AREA URNS AUTO: 2 /HPF (ref 0–5)

## 2017-11-06 PROCEDURE — 81001 URINALYSIS AUTO W/SCOPE: CPT

## 2017-11-06 PROCEDURE — 80053 COMPREHEN METABOLIC PANEL: CPT

## 2017-11-06 PROCEDURE — 36415 COLL VENOUS BLD VENIPUNCTURE: CPT

## 2017-11-06 PROCEDURE — 99213 OFFICE O/P EST LOW 20 MIN: CPT | Mod: S$GLB,,, | Performed by: STUDENT IN AN ORGANIZED HEALTH CARE EDUCATION/TRAINING PROGRAM

## 2017-11-06 PROCEDURE — 99999 PR PBB SHADOW E&M-EST. PATIENT-LVL III: CPT | Mod: PBBFAC,,, | Performed by: STUDENT IN AN ORGANIZED HEALTH CARE EDUCATION/TRAINING PROGRAM

## 2017-11-06 PROCEDURE — 85025 COMPLETE CBC W/AUTO DIFF WBC: CPT

## 2017-11-06 NOTE — PROGRESS NOTES
Internal Medicine Clinic Note  11/06/2017    Subjective:       Patient ID: Nadine M Claude is a 47 y.o. female being seen for follow up.    Chief Complaint: Follow-up    HPI   Ms. Claude is a 46 y/o F with HTN, chronic back pain, depression and heel spurs who presents for follow up. She has a chronic history of heels spurs diagnosed approx 7 years ago and being followed by Dr. Keo Leyva. After years of medical therapy, it has been decided to pursue surgical option (laser) on the day after Thanksgiving. Pt presents for required pre-op eval by her Podiatrist: CBC, CMP, UA, ECG.     Symptomatically, she has left foot pain that limits her from using the elliptical for long periods of time. She has been exercising ~15 mins on stationary bike & ~30 mins on treadmill at 3.5 mph x30 mins. She denies getting any chest pain or shortness of breath with exertion. She has had surgery before and denies any complications with anesthesia. Since last seen in clinic (Aug '17), her diverticulosis has resolved.    Past Medical History:   Diagnosis Date    Back pain     Depression     GERD (gastroesophageal reflux disease)     Hypertension      Past Surgical History:   Procedure Laterality Date    breast augmentation      DILATION AND CURETTAGE OF UTERUS       Patient's Medications   New Prescriptions    No medications on file   Previous Medications    ASPIRIN-ACETAMINOPHEN-CAFFEINE 250-250-65 MG (EXCEDRIN MIGRAINE) 250-250-65 MG PER TABLET    Take 1 tablet by mouth every 6 (six) hours as needed for Pain.    HYDROCODONE-ACETAMINOPHEN 5-325MG (NORCO) 5-325 MG PER TABLET    Take 1 tablet by mouth every 4 (four) hours as needed for Pain.    LOSARTAN-HYDROCHLOROTHIAZIDE 50-12.5 MG (HYZAAR) 50-12.5 MG PER TABLET    Take 1 tablet by mouth once daily.      ONDANSETRON (ZOFRAN) 4 MG TABLET    Take 1 tablet (4 mg total) by mouth every 6 (six) hours as needed for Nausea.    POLYETHYLENE GLYCOL (GLYCOLAX) 17 GRAM/DOSE POWDER    Take 17  "g by mouth once daily.    PROAIR HFA 90 MCG/ACTUATION INHALER    Inhale 90 mcg into the lungs as needed.    ZOLPIDEM (AMBIEN) 10 MG TAB    Take 5 mg by mouth nightly as needed.     Modified Medications    No medications on file   Discontinued Medications    CELECOXIB (CELEBREX) 200 MG CAPSULE    Take 200 mg by mouth once daily.     Patient Active Problem List    Diagnosis Date Noted    Gastroesophageal reflux disease 08/21/2017    Obesity (BMI 30-39.9) 08/21/2017    Diverticulosis 08/17/2017    Essential hypertension 08/17/2017     Review of Systems   Constitutional: Negative for chills, fever and weight loss.   HENT: Negative for congestion, hearing loss, sore throat and tinnitus.    Eyes: Negative for blurred vision and double vision.   Respiratory: Negative for cough, sputum production, shortness of breath and wheezing.    Cardiovascular: Negative for chest pain, palpitations, claudication and leg swelling.   Gastrointestinal: Negative for abdominal pain, constipation, diarrhea, heartburn, nausea and vomiting.   Genitourinary: Negative for dysuria, frequency, hematuria and urgency.   Musculoskeletal: Negative for back pain, joint pain and myalgias.        Left foot pain   Skin: Negative for rash.   Neurological: Negative for dizziness, tingling, weakness and headaches.   Psychiatric/Behavioral: Negative for depression. The patient is not nervous/anxious.      Objective:      /68 (BP Location: Left arm, Patient Position: Sitting, BP Method: Medium (Automatic))   Pulse 86   Ht 5' 4" (1.626 m)   Wt 98.6 kg (217 lb 6 oz)   BMI 37.31 kg/m²   Body mass index is 37.31 kg/m².    Physical Exam   Constitutional: She is oriented to person, place, and time. She appears well-developed and well-nourished.   HENT:   Head: Normocephalic and atraumatic.   Eyes: Pupils are equal, round, and reactive to light.   Neck: Normal range of motion. No JVD present.   Cardiovascular: Normal rate, regular rhythm, normal heart " sounds and intact distal pulses.  Exam reveals no gallop and no friction rub.    No murmur heard.  Pulmonary/Chest: Effort normal. No respiratory distress. She has no wheezes. She has no rales.   Abdominal: Soft. Bowel sounds are normal. She exhibits no distension. There is no tenderness.   Musculoskeletal: Normal range of motion.   Lymphadenopathy:     She has no cervical adenopathy.   Neurological: She is alert and oriented to person, place, and time.   Skin: Skin is warm and dry. Capillary refill takes less than 2 seconds.   Psychiatric: She has a normal mood and affect.   Nursing note and vitals reviewed.      Assessment:       1. Pre-op evaluation  Comprehensive metabolic panel    CBC auto differential    URINALYSIS    IN OFFICE EKG 12-LEAD (to [place])   2. Essential hypertension  Comprehensive metabolic panel    CBC auto differential    URINALYSIS    IN OFFICE EKG 12-LEAD (to [place])      Plan:         Nadine M Claude is a 47 y.o. female being seen for pre-op evaluation    1. Pre-op evaluation  2. Hypertension  - per request for Dr. Keo Leyva (Podiatrist at [facility]), following labs ordered: CBC, CMP, UA, ECG  - after reviewing, will fax labs to [phone]  - patient has RCRI 0 which equates to 0.4% risk for major adverse cardiac events for non-invasive laser surgery for heel spurs.    RTC in 3 months for annual check up    Patient seen and plan of care discussed with Dr. Amie Mills MD  Internal Medicine, PGY-2  [phone]

## 2017-11-09 ENCOUNTER — TELEPHONE (OUTPATIENT)
Dept: OBSTETRICS AND GYNECOLOGY | Facility: CLINIC | Age: 47
End: 2017-11-09

## 2017-11-09 DIAGNOSIS — Z30.9 ENCOUNTER FOR CONTRACEPTIVE MANAGEMENT, UNSPECIFIED TYPE: Primary | ICD-10-CM

## 2017-11-09 RX ORDER — NORGESTIMATE AND ETHINYL ESTRADIOL 7DAYSX3 28
1 KIT ORAL DAILY
Qty: 28 TABLET | Refills: 11 | Status: SHIPPED | OUTPATIENT
Start: 2017-11-09 | End: 2017-11-30 | Stop reason: SDUPTHER

## 2017-11-09 NOTE — TELEPHONE ENCOUNTER
Patient was scheduled to come in today but started cycle. Rescheduled for November 30th.     Requesting 1 month of ocp be sent to the pharmacy. Patient states that it doesn't matter which kind.    Please advise.

## 2017-11-09 NOTE — TELEPHONE ENCOUNTER
----- Message from Meka Belle sent at 11/9/2017 10:29 AM CST -----  Contact: self/[phone]  Patient called to have nurse call her back in regard to her birth control.  Please call and advise.

## 2017-11-13 NOTE — PROGRESS NOTES
I have personally taken the history and examined this patient and agree with the resident's note as stated above with the following thoughts:    Labs reviewed-- cleared for anesthesia and surgery

## 2017-11-15 ENCOUNTER — HOSPITAL ENCOUNTER (OUTPATIENT)
Dept: CARDIOLOGY | Facility: CLINIC | Age: 47
Discharge: HOME | End: 2017-11-15
Payer: COMMERCIAL

## 2017-11-15 PROCEDURE — 93000 ELECTROCARDIOGRAM COMPLETE: CPT | Mod: S$GLB,,, | Performed by: INTERNAL MEDICINE

## 2017-11-17 ENCOUNTER — DOCUMENTATION ONLY (OUTPATIENT)
Dept: INTERNAL MEDICINE | Facility: CLINIC | Age: 47
End: 2017-11-17

## 2017-11-17 ENCOUNTER — PATIENT MESSAGE (OUTPATIENT)
Dept: INTERNAL MEDICINE | Facility: CLINIC | Age: 47
End: 2017-11-17

## 2017-11-17 NOTE — PROGRESS NOTES
Reviewed results of ECG (wnl)  Faxed over labs results (CBC, CMP, UA) and ECG to Dr. Leyva's office at [phone] for pre-op clearance.    Shara Mills MD  Internal Medicine, PGY-2  [phone]

## 2017-11-30 ENCOUNTER — OFFICE VISIT (OUTPATIENT)
Dept: OBSTETRICS AND GYNECOLOGY | Facility: CLINIC | Age: 47
End: 2017-11-30
Payer: COMMERCIAL

## 2017-11-30 VITALS
DIASTOLIC BLOOD PRESSURE: 82 MMHG | WEIGHT: 216.06 LBS | BODY MASS INDEX: 36.89 KG/M2 | SYSTOLIC BLOOD PRESSURE: 114 MMHG | HEIGHT: 64 IN

## 2017-11-30 DIAGNOSIS — Z30.9 ENCOUNTER FOR CONTRACEPTIVE MANAGEMENT, UNSPECIFIED TYPE: ICD-10-CM

## 2017-11-30 DIAGNOSIS — Z01.419 WELL WOMAN EXAM WITH ROUTINE GYNECOLOGICAL EXAM: Primary | ICD-10-CM

## 2017-11-30 PROCEDURE — 99999 PR PBB SHADOW E&M-EST. PATIENT-LVL IV: CPT | Mod: PBBFAC,,, | Performed by: OBSTETRICS & GYNECOLOGY

## 2017-11-30 PROCEDURE — 99396 PREV VISIT EST AGE 40-64: CPT | Mod: S$GLB,,, | Performed by: OBSTETRICS & GYNECOLOGY

## 2017-11-30 PROCEDURE — 88175 CYTOPATH C/V AUTO FLUID REDO: CPT

## 2017-11-30 RX ORDER — BUPROPION HYDROCHLORIDE 150 MG/1
TABLET ORAL
COMMUNITY
Start: 2017-10-15 | End: 2018-06-21

## 2017-11-30 RX ORDER — CEFDINIR 300 MG/1
CAPSULE ORAL
COMMUNITY
Start: 2017-11-24 | End: 2018-02-12

## 2017-11-30 RX ORDER — PHENTERMINE HYDROCHLORIDE 37.5 MG/1
37.5 TABLET ORAL
Qty: 30 TABLET | Refills: 2 | Status: SHIPPED | OUTPATIENT
Start: 2017-11-30 | End: 2017-12-30

## 2017-11-30 RX ORDER — NORGESTIMATE AND ETHINYL ESTRADIOL 7DAYSX3 28
1 KIT ORAL DAILY
Qty: 28 TABLET | Refills: 12 | Status: SHIPPED | OUTPATIENT
Start: 2017-11-30 | End: 2019-02-25 | Stop reason: SDUPTHER

## 2017-11-30 NOTE — PROGRESS NOTES
Subjective:       Patient ID: Nadine M Claude is a 47 y.o. female.    Chief Complaint: Well Woman    Doing well on OCP; requests adipex---counseled for appropriate weight loss  BMI 37    HPI  Review of Systems   Gastrointestinal: Negative for abdominal distention, abdominal pain, constipation and nausea.   Genitourinary: Negative for dyspareunia, dysuria, genital sores, pelvic pain, vaginal bleeding and vaginal discharge.       Objective:      Physical Exam   Constitutional: She appears well-developed and well-nourished.   Pulmonary/Chest: Right breast exhibits no mass, no nipple discharge, no skin change and no tenderness. Left breast exhibits no mass, no nipple discharge, no skin change and no tenderness.   Abdominal: Soft. Bowel sounds are normal. She exhibits no distension and no mass. There is no tenderness. There is no rebound and no guarding. Hernia confirmed negative in the right inguinal area and confirmed negative in the left inguinal area.   Genitourinary: Rectum normal, vagina normal and uterus normal. No breast tenderness or discharge. There is no lesion on the right labia. There is no lesion on the left labia. Uterus is not fixed and not tender. Cervix exhibits no motion tenderness, no discharge and no friability. Right adnexum displays no mass, no tenderness and no fullness. Left adnexum displays no mass, no tenderness and no fullness. No tenderness in the vagina. No vaginal discharge found.   Lymphadenopathy:        Right: No inguinal adenopathy present.        Left: No inguinal adenopathy present.       Assessment:       1. Well woman exam with routine gynecological exam    2. Encounter for contraceptive management, unspecified type    3. BMI 37.0-37.9, adult        Plan:         annual gyn visit; pap and mammogram; continue OCP; rx adipex

## 2017-12-12 ENCOUNTER — TELEPHONE (OUTPATIENT)
Dept: OBSTETRICS AND GYNECOLOGY | Facility: CLINIC | Age: 47
End: 2017-12-12

## 2018-02-12 ENCOUNTER — OFFICE VISIT (OUTPATIENT)
Dept: INTERNAL MEDICINE | Facility: CLINIC | Age: 48
End: 2018-02-12
Payer: COMMERCIAL

## 2018-02-12 ENCOUNTER — HOSPITAL ENCOUNTER (OUTPATIENT)
Dept: RADIOLOGY | Facility: HOSPITAL | Age: 48
Discharge: HOME | End: 2018-02-12
Attending: OBSTETRICS & GYNECOLOGY
Payer: COMMERCIAL

## 2018-02-12 VITALS
HEART RATE: 90 BPM | WEIGHT: 216.06 LBS | OXYGEN SATURATION: 97 % | DIASTOLIC BLOOD PRESSURE: 72 MMHG | SYSTOLIC BLOOD PRESSURE: 118 MMHG | BODY MASS INDEX: 36.89 KG/M2 | HEIGHT: 64 IN

## 2018-02-12 DIAGNOSIS — K21.9 GASTROESOPHAGEAL REFLUX DISEASE WITHOUT ESOPHAGITIS: ICD-10-CM

## 2018-02-12 DIAGNOSIS — G47.00 INSOMNIA, UNSPECIFIED TYPE: ICD-10-CM

## 2018-02-12 DIAGNOSIS — E66.9 OBESITY (BMI 30-39.9): ICD-10-CM

## 2018-02-12 DIAGNOSIS — J45.20 MILD INTERMITTENT ASTHMA WITHOUT COMPLICATION: ICD-10-CM

## 2018-02-12 DIAGNOSIS — Z01.419 WELL WOMAN EXAM WITH ROUTINE GYNECOLOGICAL EXAM: ICD-10-CM

## 2018-02-12 DIAGNOSIS — F41.9 ANXIETY: Primary | ICD-10-CM

## 2018-02-12 PROCEDURE — 77067 SCR MAMMO BI INCL CAD: CPT | Mod: TC

## 2018-02-12 PROCEDURE — 77063 BREAST TOMOSYNTHESIS BI: CPT | Mod: 26,,, | Performed by: RADIOLOGY

## 2018-02-12 PROCEDURE — 77067 SCR MAMMO BI INCL CAD: CPT | Mod: 26,,, | Performed by: RADIOLOGY

## 2018-02-12 PROCEDURE — 99999 PR PBB SHADOW E&M-EST. PATIENT-LVL V: CPT | Mod: PBBFAC,,, | Performed by: STUDENT IN AN ORGANIZED HEALTH CARE EDUCATION/TRAINING PROGRAM

## 2018-02-12 PROCEDURE — 3008F BODY MASS INDEX DOCD: CPT | Mod: S$GLB,,, | Performed by: STUDENT IN AN ORGANIZED HEALTH CARE EDUCATION/TRAINING PROGRAM

## 2018-02-12 PROCEDURE — 99214 OFFICE O/P EST MOD 30 MIN: CPT | Mod: S$GLB,,, | Performed by: STUDENT IN AN ORGANIZED HEALTH CARE EDUCATION/TRAINING PROGRAM

## 2018-02-12 RX ORDER — EFINACONAZOLE 100 MG/ML
SOLUTION TOPICAL
Refills: 5 | COMMUNITY
Start: 2018-02-05 | End: 2018-06-21

## 2018-02-12 RX ORDER — ALBUTEROL SULFATE 90 UG/1
1 AEROSOL, METERED RESPIRATORY (INHALATION)
Qty: 18 G | Refills: 2 | Status: SHIPPED | OUTPATIENT
Start: 2018-02-12 | End: 2018-06-21

## 2018-02-12 RX ORDER — ZOLPIDEM TARTRATE 5 MG/1
5 TABLET ORAL NIGHTLY PRN
Qty: 60 TABLET | Refills: 0 | Status: SHIPPED | OUTPATIENT
Start: 2018-02-12 | End: 2018-06-21

## 2018-02-12 RX ORDER — OMEPRAZOLE 40 MG/1
40 CAPSULE, DELAYED RELEASE ORAL EVERY MORNING
Qty: 42 CAPSULE | Refills: 0 | Status: SHIPPED | OUTPATIENT
Start: 2018-02-12 | End: 2019-05-07

## 2018-02-12 RX ORDER — HYDROCODONE BITARTRATE AND ACETAMINOPHEN 10; 325 MG/1; MG/1
TABLET ORAL
COMMUNITY
Start: 2017-11-24 | End: 2019-05-07

## 2018-02-13 NOTE — PROGRESS NOTES
"Internal Medicine Clinic Note  02/12/2018    Subjective:       Patient ID: Nadine M Claude is a 47 y.o. female being seen for follow up.    Chief Complaint: Follow-up (3mo.)    HPI   Ms. Claude is a 48 y/o F with HTN, chronic back pain, HTN, who presents for follow up appointment.   She was last seen in clinic Nov '17. Since that time, she underwent surgery for heel spurs. Recovery was uneventful and pt has returned to exercising on the treadmill & elliptical without experiencing pain.     Pt's primary complaint today is epigastric chest pain. Pain initially onset ~4 days ago, while at rest. Pt describes constant chest pain sharp that is non-radiating and non-reproducible. She denies any trauma to the area. Pt denies any n/v, nighttime awakenings, chest pain with exertion, SOBOE, weight loss, difficulty swallowing food etc. She reports she has been stressed (more than usual) for the last week because her uncle is in the [facility] ICU s/p repair for AAA rupture. She has been visiting him nearly daily. She previously has been diagnose with GERD was taking Ranitidine 150mg once in a while. She reports inconsistent food intake and prolonged periods of fasting which exacerbate her pain. The pain is relieved with belching and sometimes with ranitidine. She worked out last week on Monday, during which she did not experience any chest pain. She reports this pain is similar to her previous episodes of "gas pains".    Review of her medical problems  Diverticulosis - stable; last acute diverticulitis episode in Aug '17  Asthma (mild intermittent) - requires Proair <1-2 times/week. Reports no nighttime awakenings or active wheezing.  HTN - controlled on hyzaar 50-12.5mg daily  Chronic back pain - controlled on norco & excedrin   Obesity - has started exercising @ [employer] last week! :) pt will continue to gym 6-8pm when her daughter has tutoring in the evenings. Actively trying to lose weight.    Healthcare maintenance - up to " "date on labs, pap smear, refused influenza vaccine '17-'18, will schedule MMG on her way out.    Patient Active Problem List    Diagnosis Date Noted    Intermittent asthma 02/12/2018    Insomnia 02/12/2018    Gastroesophageal reflux disease 08/21/2017    Obesity (BMI 30-39.9) 08/21/2017    Diverticulosis 08/17/2017    Essential hypertension 08/17/2017     Review of Systems   Constitutional: Negative for chills, fever and weight loss.   HENT: Negative for congestion, hearing loss, sore throat and tinnitus.    Eyes: Negative for blurred vision and double vision.   Respiratory: Negative for cough, sputum production, shortness of breath and wheezing.    Cardiovascular: Positive for chest pain; Negative for palpitations, claudication and leg swelling.   Gastrointestinal: Negative for abdominal pain, constipation, diarrhea, heartburn, nausea and vomiting.   Genitourinary: Negative for dysuria, frequency, hematuria and urgency.   Musculoskeletal: Negative for back pain, joint pain and myalgias.   Skin: Negative for rash.   Neurological: Negative for dizziness, tingling, weakness and headaches.   Psychiatric/Behavioral: Negative for depression. The patient is not nervous/anxious.      Objective:      /72   Pulse 90   Ht 5' 4" (1.626 m)   Wt 98 kg (216 lb 0.8 oz)   LMP 02/06/2018 (Approximate)   SpO2 97%   BMI 37.09 kg/m²   Body mass index is 37.09 kg/m².    Physical Exam   Constitutional: She is oriented to person, place, and time. She appears well-developed and well-nourished.   HENT:   Head: Normocephalic and atraumatic.   Eyes: Pupils are equal, round, and reactive to light.   Neck: Normal range of motion. No JVD present.   Cardiovascular: Normal rate, regular rhythm, normal heart sounds and intact distal pulses. No murmur or added sounds. Pulmonary/Chest: Effort normal. No respiratory distress. She has no wheezes. She has no rales.   Abdominal: Soft. Bowel sounds are normal. She exhibits no distension. " There is no tenderness.   Musculoskeletal: Normal range of motion.   Neurological: She is alert and oriented to person, place, and time.   Skin: Skin is warm and dry. Capillary refill takes less than 2 seconds.   Psychiatric: She has a normal mood and affect.   Nursing note and vitals reviewed.      Assessment:       1. Anxiety  Ambulatory Referral to Psychiatry   2. Gastroesophageal reflux disease  omeprazole (PRILOSEC) 40 MG capsule   3. Obesity (BMI 30-39.9)  PROAIR HFA 90 mcg/actuation inhaler    Ambulatory consult to Sleep Disorders   4. Mild intermittent asthma without complication     5. Insomnia, unspecified type  zolpidem (AMBIEN) 5 MG Tab    Ambulatory Referral to Psychiatry    Ambulatory consult to Sleep Disorders      Plan:       Nadine M Claude is a 47 y.o. female being seen for follow up visit. Her primary complaint for this visit was epigastric chest pain, given the characteristics (continuing for last few days), her stressors, postprandial, relief with belching, patient most likely due to stress-induced exacerbation of her GERD, which is uncontrolled by her intermittent Ranitidine use. Pain is atypical for cardiac etiology because it non-exertional and pt did not have chest pain or SOB with exertion. Given lack of trauma and lack of reproducibility with exam, its unlikely costochondritis. She does not have any red flag symptoms to warrant further investigation at this time.     1. Epigastric chest pain   - trial of omeprazole x6 wks daily in the morning  - pt encouraged to take Tums postprandial     2. Insomnia  Anxiety  - trouble falling asleep due to worrying thoughts. Suspect underlying JOSE ALBERTO for which buproprion may not be controlling her symptoms.   - plan to address anxiety in further detail at the next visit  - referral to Psychiatry for behavioral counseling (pt is open to try)  - meanwhile, temporarily refilled Ambien and stressed dangers of long term use. Pt is willing to try alternative  methods (sleep hygiene, behavioral counseling, better control of anxiety) and be weaned off the ambien.   - pt also at risk for BRYCE given her obesity -> pt referred to Sleep Disorders for sleep study    3. Obesity  - congratulated pt on starting exercise at the gym. Encouraged her to workout 30 mins x 3-4 times a week  - encouraged dietary compliance with low-salt, low-fat diet.  - pt actively trying to lose weight with lifestyle changes; encouraged & will continue to monitor    4. Asthma (mild intermittent)  - refilled Proair, which pt takes < 1-2 times/week prn    RTC in 2 months to address insomnia +/- anxiety.     Patient seen and plan of care discussed with Dr. Amie Mills MD  Internal Medicine, PGY-2  [phone]

## 2018-02-19 NOTE — PROGRESS NOTES
I have personally taken the history and examined this patient and agree with the resident's note as stated above with the following thoughts:    She will work on weight loss.

## 2018-06-04 ENCOUNTER — TELEPHONE (OUTPATIENT)
Dept: OBSTETRICS AND GYNECOLOGY | Facility: CLINIC | Age: 48
End: 2018-06-04

## 2018-06-04 RX ORDER — CLOTRIMAZOLE AND BETAMETHASONE DIPROPIONATE 10; .64 MG/G; MG/G
CREAM TOPICAL
Qty: 45 G | Refills: 1 | Status: SHIPPED | OUTPATIENT
Start: 2018-06-04 | End: 2018-06-21 | Stop reason: SDUPTHER

## 2018-06-04 NOTE — TELEPHONE ENCOUNTER
----- Message from Yun Meeks sent at 6/4/2018  8:16 AM CDT -----  Contact: Self/ [phone]  Patient would like to be seen sooner than the next available appointment. She has a vaginal rash and now is raw.    Please call and advise.

## 2018-06-04 NOTE — TELEPHONE ENCOUNTER
Patient has complaints of a vulvar rash. No reports of itching or vaginal discharge. States that she started using a new powder and noticed this rash and irritation. Reports a raw sensation.     Please advise.

## 2018-06-04 NOTE — TELEPHONE ENCOUNTER
Patient instructed to stop using the Summer Sarah's scented powder. Will start on Lotrisone cream today and call back if problem persist. All questions answered and patient verbalized understanding.

## 2018-06-08 RX ORDER — FLUCONAZOLE 150 MG/1
150 TABLET ORAL DAILY
Qty: 1 TABLET | Refills: 0 | Status: SHIPPED | OUTPATIENT
Start: 2018-06-08 | End: 2018-06-09

## 2018-06-08 NOTE — TELEPHONE ENCOUNTER
----- Message from Yun Meeks sent at 6/8/2018  9:52 AM CDT -----  Contact: Self/ [phone]  Patient is taking medication that caused her to have a yeast infection. She would like a prescription called in.    Please call and advise.     [facility] [id number] [facility] [address] AT [facility] [address]

## 2018-06-08 NOTE — TELEPHONE ENCOUNTER
Patient has complaints of a yeast infection. Requesting rx be sent to the pharmacy.    Please advise.

## 2018-06-21 ENCOUNTER — OFFICE VISIT (OUTPATIENT)
Dept: OBSTETRICS AND GYNECOLOGY | Facility: CLINIC | Age: 48
End: 2018-06-21
Payer: COMMERCIAL

## 2018-06-21 VITALS
SYSTOLIC BLOOD PRESSURE: 118 MMHG | WEIGHT: 217.13 LBS | BODY MASS INDEX: 37.07 KG/M2 | DIASTOLIC BLOOD PRESSURE: 78 MMHG | HEIGHT: 64 IN

## 2018-06-21 DIAGNOSIS — B36.9 DERMATITIS FUNGAL: Primary | ICD-10-CM

## 2018-06-21 PROCEDURE — 99213 OFFICE O/P EST LOW 20 MIN: CPT | Mod: S$GLB,,, | Performed by: OBSTETRICS & GYNECOLOGY

## 2018-06-21 PROCEDURE — 99999 PR PBB SHADOW E&M-EST. PATIENT-LVL III: CPT | Mod: PBBFAC,,, | Performed by: OBSTETRICS & GYNECOLOGY

## 2018-06-21 RX ORDER — ZOLPIDEM TARTRATE 10 MG/1
1 TABLET ORAL
COMMUNITY
Start: 2018-05-29 | End: 2018-06-21

## 2018-06-21 RX ORDER — LOSARTAN POTASSIUM AND HYDROCHLOROTHIAZIDE 12.5; 5 MG/1; MG/1
TABLET ORAL
COMMUNITY
Start: 2018-05-29 | End: 2018-06-21

## 2018-06-21 RX ORDER — BUPROPION HYDROCHLORIDE 150 MG/1
1 TABLET ORAL
COMMUNITY
Start: 2018-05-29 | End: 2018-06-21

## 2018-06-21 RX ORDER — CLOTRIMAZOLE AND BETAMETHASONE DIPROPIONATE 10; .64 MG/G; MG/G
CREAM TOPICAL
Qty: 45 G | Refills: 3 | Status: SHIPPED | OUTPATIENT
Start: 2018-06-21 | End: 2020-02-06

## 2018-06-21 RX ORDER — IBUPROFEN 600 MG/1
TABLET ORAL
Refills: 0 | COMMUNITY
Start: 2018-06-07 | End: 2018-06-21

## 2018-06-21 NOTE — PROGRESS NOTES
Patient returns for follow-up of a dermatitis problem.  She had a yeast type dermatitis between the labia and groin area, between the thighs, and under the pannus.  This has responded to Lotrisone but is still somewhat present.  The patient needs a refill and she has been unable to obtain her refill which was already ordered from the pharmacy who states that she is coming back to soon.  Perhaps they do not understand the surface areas that she is using the medication on.  The patient is also counseled that she does not need to use a very large thick coverage but just a small thin amount that is rubbed in on application.  Her refill his reprinted to take to the pharmacy with today's date.  There is no sign of other problems, ALLERGIC reaction, STD, etc. etc.  Patient also stated that she is not ready at the moment but will call in the future when she is prepared to attempt the use of Adipex again.  The prescription will be called out when she requests.  (BMI 37.27).

## 2018-09-10 ENCOUNTER — HOSPITAL ENCOUNTER (EMERGENCY)
Facility: HOSPITAL | Age: 48
Discharge: HOME | End: 2018-09-10
Attending: EMERGENCY MEDICINE
Payer: COMMERCIAL

## 2018-09-10 VITALS
WEIGHT: 220 LBS | BODY MASS INDEX: 37.56 KG/M2 | TEMPERATURE: 99 F | HEART RATE: 76 BPM | SYSTOLIC BLOOD PRESSURE: 139 MMHG | HEIGHT: 64 IN | RESPIRATION RATE: 20 BRPM | DIASTOLIC BLOOD PRESSURE: 88 MMHG | OXYGEN SATURATION: 99 %

## 2018-09-10 DIAGNOSIS — R51.9 FRONTAL HEADACHE: Primary | ICD-10-CM

## 2018-09-10 LAB
ANION GAP SERPL CALC-SCNC: 6 MMOL/L
B-HCG UR QL: NEGATIVE
BASOPHILS # BLD AUTO: 0.02 K/UL
BASOPHILS NFR BLD: 0.2 %
BUN SERPL-MCNC: 12 MG/DL
CALCIUM SERPL-MCNC: 10 MG/DL
CHLORIDE SERPL-SCNC: 104 MMOL/L
CO2 SERPL-SCNC: 28 MMOL/L
CREAT SERPL-MCNC: 0.7 MG/DL
CTP QC/QA: YES
DIFFERENTIAL METHOD: ABNORMAL
EOSINOPHIL # BLD AUTO: 0.2 K/UL
EOSINOPHIL NFR BLD: 1.9 %
ERYTHROCYTE [DISTWIDTH] IN BLOOD BY AUTOMATED COUNT: 14.5 %
EST. GFR  (AFRICAN AMERICAN): >60 ML/MIN/1.73 M^2
EST. GFR  (NON AFRICAN AMERICAN): >60 ML/MIN/1.73 M^2
GLUCOSE SERPL-MCNC: 82 MG/DL
HCT VFR BLD AUTO: 40 %
HGB BLD-MCNC: 13.4 G/DL
IMM GRANULOCYTES # BLD AUTO: 0.03 K/UL
IMM GRANULOCYTES NFR BLD AUTO: 0.3 %
LYMPHOCYTES # BLD AUTO: 2.5 K/UL
LYMPHOCYTES NFR BLD: 28.1 %
MCH RBC QN AUTO: 29.4 PG
MCHC RBC AUTO-ENTMCNC: 33.5 G/DL
MCV RBC AUTO: 88 FL
MONOCYTES # BLD AUTO: 0.6 K/UL
MONOCYTES NFR BLD: 6.8 %
NEUTROPHILS # BLD AUTO: 5.5 K/UL
NEUTROPHILS NFR BLD: 62.7 %
NRBC BLD-RTO: 0 /100 WBC
PLATELET # BLD AUTO: 336 K/UL
PMV BLD AUTO: 8.6 FL
POTASSIUM SERPL-SCNC: 4.5 MMOL/L
RBC # BLD AUTO: 4.56 M/UL
SODIUM SERPL-SCNC: 138 MMOL/L
WBC # BLD AUTO: 8.74 K/UL

## 2018-09-10 PROCEDURE — 25000003 PHARM REV CODE 250: Performed by: PHYSICIAN ASSISTANT

## 2018-09-10 PROCEDURE — 81025 URINE PREGNANCY TEST: CPT | Performed by: PHYSICIAN ASSISTANT

## 2018-09-10 PROCEDURE — 80048 BASIC METABOLIC PNL TOTAL CA: CPT

## 2018-09-10 PROCEDURE — 25500020 PHARM REV CODE 255: Performed by: EMERGENCY MEDICINE

## 2018-09-10 PROCEDURE — 99284 EMERGENCY DEPT VISIT MOD MDM: CPT | Mod: 25

## 2018-09-10 PROCEDURE — 85025 COMPLETE CBC W/AUTO DIFF WBC: CPT

## 2018-09-10 PROCEDURE — 99284 EMERGENCY DEPT VISIT MOD MDM: CPT | Mod: ,,, | Performed by: PHYSICIAN ASSISTANT

## 2018-09-10 RX ORDER — ACETAMINOPHEN 325 MG/1
650 TABLET ORAL
Status: COMPLETED | OUTPATIENT
Start: 2018-09-10 | End: 2018-09-10

## 2018-09-10 RX ORDER — HYDROCHLOROTHIAZIDE 12.5 MG/1
12.5 TABLET ORAL DAILY
COMMUNITY
End: 2020-02-18 | Stop reason: SDUPTHER

## 2018-09-10 RX ORDER — LORATADINE 10 MG/1
10 TABLET ORAL DAILY
Qty: 10 TABLET | Refills: 0 | Status: SHIPPED | OUTPATIENT
Start: 2018-09-10 | End: 2019-05-07

## 2018-09-10 RX ORDER — BUSPIRONE HYDROCHLORIDE 5 MG/1
5 TABLET ORAL 2 TIMES DAILY
COMMUNITY
End: 2020-02-06

## 2018-09-10 RX ORDER — PROCHLORPERAZINE MALEATE 10 MG
10 TABLET ORAL EVERY 6 HOURS PRN
Qty: 10 TABLET | Refills: 0 | Status: SHIPPED | OUTPATIENT
Start: 2018-09-10 | End: 2019-05-07

## 2018-09-10 RX ADMIN — ACETAMINOPHEN 650 MG: 325 TABLET, FILM COATED ORAL at 11:09

## 2018-09-10 RX ADMIN — IOHEXOL 100 ML: 350 INJECTION, SOLUTION INTRAVENOUS at 12:09

## 2018-09-10 NOTE — ED TRIAGE NOTES
"Pt c/o headache that began yesterday.  States "it woke me up during the night and when I sit up it hurts worse".  Pt states "I suffer with headaches but not like this".  Pain is located in the front.   (+) dizziness "when I first stand up"  "

## 2018-09-10 NOTE — ED PROVIDER NOTES
Encounter Date: 9/10/2018       History     Chief Complaint   Patient presents with    Headache     started yesterday but not the same kind, light headache everyday for 2 weeks     The patient presents to the ER for an emergent evaluation due an acute headache. She states that she first noticed the headache around 8 pm last night. She states that the pain was bilateral and frontal. She states that the pain gradually onset and was initially mild and would come and go. She states that she woke up at 2 am due to the headache. She states that the pain was still bilateral and frontal, only more intense. She states that she suspected that it may be due to her blood pressure and she took a tablet of HCTZ. She states that the pain did not improve. She states that this morning she felt slightly dizzy. She states that lights seem to make the pain worse. She states that she has a history of migraines and sinus headaches, but that this headache pain is worse. She states that she usually takes Excedrin for headaches, but did not want to take any Excedrin because she has 2 aunts that had cerebral aneurysms and she did not want to take any aspirin in case her headache pain was due to bleeding. She denies any additional symptoms or concerns. She denies thunderclap. She does report worst of life. She called in to work this morning and drove herself to the ER.       She also notes that she has not had a period since June. She states that she stopped taking her daily BP pill in June. She states that she has taken 2 home pregnancy tests in the past 2 weeks and they were both negative.           Review of patient's allergies indicates:   Allergen Reactions    Codeine Hallucinations     Past Medical History:   Diagnosis Date    Back pain     Depression     GERD (gastroesophageal reflux disease)     Hypertension     Miscarriage      Past Surgical History:   Procedure Laterality Date    breast augmentation      DILATION AND  CURETTAGE OF UTERUS      TOTAL REDUCTION MAMMOPLASTY       Family History   Problem Relation Age of Onset    Colon cancer Paternal Grandfather     Diabetes Mother     Heart disease Father     Aortic aneurysm Brother     Cerebral aneurysm Maternal Aunt      Social History     Tobacco Use    Smoking status: Never Smoker    Smokeless tobacco: Never Used   Substance Use Topics    Alcohol use: Yes     Comment: once a month    Drug use: Yes     Types: Marijuana     Comment: once a month     Review of Systems   Constitutional: Negative for activity change, chills, diaphoresis, fatigue and fever.   HENT: Negative for congestion, drooling, ear pain, facial swelling, postnasal drip, rhinorrhea, sinus pressure, sinus pain, sore throat, tinnitus and trouble swallowing.    Eyes: Positive for photophobia. Negative for pain and visual disturbance.   Respiratory: Negative for cough, chest tightness and shortness of breath.    Cardiovascular: Negative for chest pain and palpitations.   Gastrointestinal: Negative for abdominal pain, diarrhea, nausea and vomiting.   Genitourinary: Positive for menstrual problem. Negative for decreased urine volume, dysuria, flank pain, frequency, vaginal bleeding and vaginal discharge.   Musculoskeletal: Negative for arthralgias, back pain, gait problem, myalgias, neck pain and neck stiffness.   Skin: Negative for rash and wound.   Neurological: Positive for dizziness and headaches. Negative for tremors, seizures, syncope, facial asymmetry, speech difficulty, weakness, light-headedness and numbness.   Psychiatric/Behavioral: Negative for confusion.       Physical Exam     Initial Vitals [09/10/18 0919]   BP Pulse Resp Temp SpO2   (!) 142/81 92 18 98.4 °F (36.9 °C) 97 %      MAP       --         Physical Exam    Nursing note and vitals reviewed.  Constitutional: She appears well-developed and well-nourished. She is not diaphoretic.   HENT:   Head: Normocephalic and atraumatic.   Mouth/Throat:  Oropharynx is clear and moist.   Normal otic exam. Clear oropharynx.    Eyes: Conjunctivae and EOM are normal. Pupils are equal, round, and reactive to light.   There is horizontal nystagmus.    Neck: Normal range of motion. Neck supple.   Non-tender. No nuchal rigidity.    Cardiovascular: Normal rate, regular rhythm and intact distal pulses.   Pulmonary/Chest: Breath sounds normal. No respiratory distress.   Abdominal: Soft. There is no tenderness. There is no guarding.   Musculoskeletal: Normal range of motion.   Neurological: She is alert and oriented to person, place, and time. She has normal strength. No sensory deficit.   No facial droop. No pronator drift. Normal gait. No dysarthria. Normal finger to nose. 5/5 strength extremities x 4.    Skin: Skin is warm and dry.   Psychiatric: She has a normal mood and affect. Her behavior is normal.         ED Course   Procedures  Labs Reviewed   CBC W/ AUTO DIFFERENTIAL - Abnormal; Notable for the following components:       Result Value    MPV 8.6 (*)     All other components within normal limits   BASIC METABOLIC PANEL - Abnormal; Notable for the following components:    Anion Gap 6 (*)     All other components within normal limits   POCT URINE PREGNANCY     Results for orders placed or performed during the hospital encounter of 09/10/18   CBC auto differential   Result Value Ref Range    WBC 8.74 3.90 - 12.70 K/uL    RBC 4.56 4.00 - 5.40 M/uL    Hemoglobin 13.4 12.0 - 16.0 g/dL    Hematocrit 40.0 37.0 - 48.5 %    MCV 88 82 - 98 fL    MCH 29.4 27.0 - 31.0 pg    MCHC 33.5 32.0 - 36.0 g/dL    RDW 14.5 11.5 - 14.5 %    Platelets 336 150 - 350 K/uL    MPV 8.6 (L) 9.2 - 12.9 fL    Immature Granulocytes 0.3 0.0 - 0.5 %    Gran # (ANC) 5.5 1.8 - 7.7 K/uL    Immature Grans (Abs) 0.03 0.00 - 0.04 K/uL    Lymph # 2.5 1.0 - 4.8 K/uL    Mono # 0.6 0.3 - 1.0 K/uL    Eos # 0.2 0.0 - 0.5 K/uL    Baso # 0.02 0.00 - 0.20 K/uL    nRBC 0 0 /100 WBC    Gran% 62.7 38.0 - 73.0 %    Lymph%  "28.1 18.0 - 48.0 %    Mono% 6.8 4.0 - 15.0 %    Eosinophil% 1.9 0.0 - 8.0 %    Basophil% 0.2 0.0 - 1.9 %    Differential Method Automated    Basic metabolic panel   Result Value Ref Range    Sodium 138 136 - 145 mmol/L    Potassium 4.5 3.5 - 5.1 mmol/L    Chloride 104 95 - 110 mmol/L    CO2 28 23 - 29 mmol/L    Glucose 82 70 - 110 mg/dL    BUN, Bld 12 6 - 20 mg/dL    Creatinine 0.7 0.5 - 1.4 mg/dL    Calcium 10.0 8.7 - 10.5 mg/dL    Anion Gap 6 (L) 8 - 16 mmol/L    eGFR if African American >60.0 >60 mL/min/1.73 m^2    eGFR if non African American >60.0 >60 mL/min/1.73 m^2   POCT urine pregnancy   Result Value Ref Range    POC Preg Test, Ur Negative Negative     Acceptable Yes           Vitals:    09/10/18 0919 09/10/18 1109 09/10/18 1314   BP: (!) 142/81 125/81 139/88   BP Location:  Right arm Right arm   Patient Position:  Sitting Sitting   Pulse: 92 77 76   Resp: 18 20    Temp: 98.4 °F (36.9 °C) 98.5 °F (36.9 °C) 98.6 °F (37 °C)   TempSrc: Oral Oral Oral   SpO2: 97% 97% 99%   Weight: 99.8 kg (220 lb)     Height: 5' 4" (1.626 m)         Imaging Results          CTA Head and Neck (xpd) (Final result)  Result time 09/10/18 14:26:17   Procedure changed from CTA Brain     Final result by Tae Yousif MD (09/10/18 14:26:17)                 Impression:      No evidence of acute intracranial pathology.    CT angiogram appears within normal limits.  No high-grade stenosis or large vessel occlusion.  No intracranial aneurysm identified.    If clinical suspicion for subarachnoid hemorrhage persists, consider lumbar puncture for further evaluation.    Electronically signed by resident: Glenroy Diaz  Date:    09/10/2018  Time:    12:58    Electronically signed by: Tae Yousif MD  Date:    09/10/2018  Time:    14:26             Narrative:    EXAMINATION:  CTA HEAD AND NECK (XPD)    CLINICAL HISTORY:  Cerebral aneurysm, SAH, cerebral vasospasm eval;    TECHNIQUE:  Non contrast low dose axial images were " obtained through the head.  CT angiogram was performed from the level of the anay to the top of the head following the IV administration of 100 mL of Omnipaque 350.  Sagittal and coronal reconstructions and maximum intensity projection reconstructions were performed. Arterial stenosis percentages are based on NASCET measurement criteria.    COMPARISON:  No relevant prior.    FINDINGS:  CT HEAD:    Ventricles are normal in size without evidence of hydrocephalus.    The brain parenchyma appears unremarkable.  No parenchymal mass, hemorrhage, or recent or remote major vascular distribution infarct.    No extra-axial blood or fluid collections.    Calvarium is intact.  Mastoid air cells and paranasal sinuses are essentially clear.    CTA HEAD AND NECK:    Left common carotid and right brachiocephalic sharing a common origin.  Aortic arch otherwise unremarkable.    The common and internal carotid arteries are patent.  Carotid bifurcations are without significant stenosis.    The extracranial vertebral arteries are patent throughout.  Vertebral arteries are similar in caliber.    Intracranial ICAs are patent without significant stenosis.  ACAs and MCAs are patent without stenosis.  Vertebrobasilar system is unremarkable.  PCAs are unremarkable.    No aneurysm identified.  No evidence of a vascular malformation.  The dural venous sinuses appear patent.    OTHER:    Lung apices appear unremarkable.  Thyroid is normal in appearance.  Normal appearing cervical lymph nodes are present.  Parotid and submandibular glands are unremarkable.  There are minimal degenerative changes of the cervical spine.                                 Medical Decision Making:   History:   Old Medical Records: I decided to obtain old medical records.  Initial Assessment:   Here for an emergent evaluation due to a gradual onset headache, bilateral frontal, that began around 8 pm last night, then woke her up from sleep significantly worse at 2 am.  Associated symptoms of photophobia and mild dizziness. Pt states that the headache is not her typical headache and that she is very worried about cerebral aneurysm due to her family history of them.   Differential Diagnosis:   Migraine, Tension HA, Stress, HA, Sinus HA, SAH, Cerebral aneurysm, CVA, Meningitis, etc   Clinical Tests:   Lab Tests: Ordered and Reviewed  Radiological Study: Ordered and Reviewed  Medical Tests: Ordered and Reviewed  ED Management:  History and exam with some components of both Migraine and sinus headache. Pt with significant history of cerebral aneurysm and concerned, therefore MRA was indicated emergently due to degree of pain and acuity of symptoms. Imaging fortunately unremarkable.     Will provided Rx for Claritan and Compazine to take PRN     Advised close follow up with her PCP for re-evaluation and further management     Advised prompt return to the ER if worse in any way or if unimproved.       Other:   I have discussed this case with another health care provider.       <> Summary of the Discussion: I discussed the case in detail with the ER attending physician who also examined the patient.                       Clinical Impression:   The encounter diagnosis was Frontal headache.      Disposition:   Disposition: Discharged  Condition: Stable                        Alexander Abreu PA-C  09/10/18 [id number]

## 2018-09-17 ENCOUNTER — TELEPHONE (OUTPATIENT)
Dept: OBSTETRICS AND GYNECOLOGY | Facility: CLINIC | Age: 48
End: 2018-09-17

## 2018-09-17 RX ORDER — PHENTERMINE HYDROCHLORIDE 37.5 MG/1
37.5 TABLET ORAL
Qty: 30 TABLET | Refills: 2 | Status: SHIPPED | OUTPATIENT
Start: 2018-09-17 | End: 2018-10-17

## 2018-12-12 ENCOUNTER — TELEPHONE (OUTPATIENT)
Dept: OBSTETRICS AND GYNECOLOGY | Facility: CLINIC | Age: 48
End: 2018-12-12

## 2018-12-12 NOTE — TELEPHONE ENCOUNTER
----- Message from Malena Gibbs sent at 12/12/2018  9:04 AM CST -----  No. [phone] or [phone]    [facility] on [address] told patient to call the office for a refill on [employer].

## 2019-02-25 DIAGNOSIS — Z30.9 ENCOUNTER FOR CONTRACEPTIVE MANAGEMENT, UNSPECIFIED TYPE: ICD-10-CM

## 2019-02-25 RX ORDER — NORGESTIMATE AND ETHINYL ESTRADIOL 7DAYSX3 28
1 KIT ORAL DAILY
Qty: 28 TABLET | Refills: 12 | Status: SHIPPED | OUTPATIENT
Start: 2019-02-25 | End: 2019-05-07 | Stop reason: SDUPTHER

## 2019-02-25 NOTE — TELEPHONE ENCOUNTER
----- Message from Joanna White sent at 2/25/2019  9:54 AM CST -----  Contact: [phone]  She needs a refill on her birth control until she can see Dr. Rutledge in April.

## 2019-05-07 ENCOUNTER — OFFICE VISIT (OUTPATIENT)
Dept: OBSTETRICS AND GYNECOLOGY | Facility: CLINIC | Age: 49
End: 2019-05-07
Payer: COMMERCIAL

## 2019-05-07 VITALS
BODY MASS INDEX: 38.58 KG/M2 | WEIGHT: 226 LBS | SYSTOLIC BLOOD PRESSURE: 130 MMHG | HEIGHT: 64 IN | DIASTOLIC BLOOD PRESSURE: 72 MMHG

## 2019-05-07 DIAGNOSIS — Z30.9 ENCOUNTER FOR CONTRACEPTIVE MANAGEMENT, UNSPECIFIED TYPE: ICD-10-CM

## 2019-05-07 DIAGNOSIS — Z01.419 WELL WOMAN EXAM WITH ROUTINE GYNECOLOGICAL EXAM: Primary | ICD-10-CM

## 2019-05-07 PROCEDURE — 88175 CYTOPATH C/V AUTO FLUID REDO: CPT

## 2019-05-07 PROCEDURE — 99396 PR PREVENTIVE VISIT,EST,40-64: ICD-10-PCS | Mod: S$GLB,,, | Performed by: OBSTETRICS & GYNECOLOGY

## 2019-05-07 PROCEDURE — 99999 PR PBB SHADOW E&M-EST. PATIENT-LVL III: CPT | Mod: PBBFAC,,, | Performed by: OBSTETRICS & GYNECOLOGY

## 2019-05-07 PROCEDURE — 99396 PREV VISIT EST AGE 40-64: CPT | Mod: S$GLB,,, | Performed by: OBSTETRICS & GYNECOLOGY

## 2019-05-07 PROCEDURE — 99999 PR PBB SHADOW E&M-EST. PATIENT-LVL III: ICD-10-PCS | Mod: PBBFAC,,, | Performed by: OBSTETRICS & GYNECOLOGY

## 2019-05-07 RX ORDER — PHENTERMINE HYDROCHLORIDE 37.5 MG/1
37.5 TABLET ORAL
Qty: 30 TABLET | Refills: 2 | Status: SHIPPED | OUTPATIENT
Start: 2019-05-07 | End: 2019-06-06

## 2019-05-07 RX ORDER — NORGESTIMATE AND ETHINYL ESTRADIOL 7DAYSX3 28
1 KIT ORAL DAILY
Qty: 28 TABLET | Refills: 12 | Status: SHIPPED | OUTPATIENT
Start: 2019-05-07 | End: 2020-02-06

## 2019-05-07 NOTE — PROGRESS NOTES
Subjective:       Patient ID: Nadine M Claude is a 48 y.o. female.    Chief Complaint: Well Woman and Weight Gain (concerned about weight gain)    Weight discussion  To continue ocp's  Pap and mammogram    HPI  Review of Systems   Gastrointestinal: Negative for abdominal distention, abdominal pain, constipation and nausea.   Genitourinary: Negative for dyspareunia, dysuria, genital sores, pelvic pain, vaginal bleeding and vaginal discharge.       Objective:      Physical Exam   Constitutional: She appears well-developed and well-nourished.   Pulmonary/Chest: Right breast exhibits no mass, no nipple discharge, no skin change and no tenderness. Left breast exhibits no mass, no nipple discharge, no skin change and no tenderness. No breast tenderness or discharge.   Abdominal: Soft. Bowel sounds are normal. She exhibits no distension and no mass. There is no tenderness. There is no rebound and no guarding. Hernia confirmed negative in the right inguinal area and confirmed negative in the left inguinal area.   Genitourinary: Rectum normal, vagina normal and uterus normal. No breast tenderness or discharge. There is no lesion on the right labia. There is no lesion on the left labia. Uterus is not fixed and not tender. Cervix exhibits no motion tenderness, no discharge and no friability. Right adnexum displays no mass, no tenderness and no fullness. Left adnexum displays no mass, no tenderness and no fullness. No tenderness in the vagina. No vaginal discharge found.   Lymphadenopathy:        Right: No inguinal adenopathy present.        Left: No inguinal adenopathy present.       Assessment:       1. Well woman exam with routine gynecological exam    2. Encounter for contraceptive management, unspecified type    3. BMI 38.0-38.9,adult        Plan:         annual gyn visit; pap and mammogram; ocp's; adipex

## 2019-05-21 ENCOUNTER — TELEPHONE (OUTPATIENT)
Dept: OBSTETRICS AND GYNECOLOGY | Facility: CLINIC | Age: 49
End: 2019-05-21

## 2019-10-17 ENCOUNTER — HOSPITAL ENCOUNTER (OUTPATIENT)
Dept: RADIOLOGY | Facility: HOSPITAL | Age: 49
Discharge: HOME | End: 2019-10-17
Attending: OBSTETRICS & GYNECOLOGY
Payer: COMMERCIAL

## 2019-10-17 DIAGNOSIS — Z12.31 VISIT FOR SCREENING MAMMOGRAM: ICD-10-CM

## 2019-10-17 DIAGNOSIS — Z01.419 WELL WOMAN EXAM WITH ROUTINE GYNECOLOGICAL EXAM: ICD-10-CM

## 2019-10-17 PROCEDURE — 77067 MAMMO DIGITAL SCREENING BILAT WITH TOMOSYNTHESIS_CAD: ICD-10-PCS | Mod: 26,,, | Performed by: RADIOLOGY

## 2019-10-17 PROCEDURE — 77067 SCR MAMMO BI INCL CAD: CPT | Mod: 26,,, | Performed by: RADIOLOGY

## 2019-10-17 PROCEDURE — 77067 SCR MAMMO BI INCL CAD: CPT | Mod: TC

## 2019-10-17 PROCEDURE — 77063 BREAST TOMOSYNTHESIS BI: CPT | Mod: 26,,, | Performed by: RADIOLOGY

## 2019-10-17 PROCEDURE — 77063 MAMMO DIGITAL SCREENING BILAT WITH TOMOSYNTHESIS_CAD: ICD-10-PCS | Mod: 26,,, | Performed by: RADIOLOGY

## 2019-12-26 ENCOUNTER — OFFICE VISIT (OUTPATIENT)
Dept: URGENT CARE | Facility: CLINIC | Age: 49
End: 2019-12-26
Payer: COMMERCIAL

## 2019-12-26 VITALS
RESPIRATION RATE: 20 BRPM | BODY MASS INDEX: 38.41 KG/M2 | HEART RATE: 87 BPM | SYSTOLIC BLOOD PRESSURE: 142 MMHG | HEIGHT: 64 IN | OXYGEN SATURATION: 95 % | TEMPERATURE: 99 F | WEIGHT: 225 LBS | DIASTOLIC BLOOD PRESSURE: 91 MMHG

## 2019-12-26 DIAGNOSIS — J10.1 INFLUENZA B: Primary | ICD-10-CM

## 2019-12-26 LAB
CTP QC/QA: YES
FLUAV AG NPH QL: NEGATIVE
FLUBV AG NPH QL: POSITIVE

## 2019-12-26 PROCEDURE — 99214 PR OFFICE/OUTPT VISIT, EST, LEVL IV, 30-39 MIN: ICD-10-PCS | Mod: S$GLB,,, | Performed by: NURSE PRACTITIONER

## 2019-12-26 PROCEDURE — 87804 POCT INFLUENZA A/B: ICD-10-PCS | Mod: 59,QW,S$GLB, | Performed by: NURSE PRACTITIONER

## 2019-12-26 PROCEDURE — 87804 INFLUENZA ASSAY W/OPTIC: CPT | Mod: QW,S$GLB,, | Performed by: NURSE PRACTITIONER

## 2019-12-26 PROCEDURE — 99214 OFFICE O/P EST MOD 30 MIN: CPT | Mod: S$GLB,,, | Performed by: NURSE PRACTITIONER

## 2019-12-26 RX ORDER — ZOLPIDEM TARTRATE 10 MG/1
TABLET ORAL
COMMUNITY
Start: 2019-11-18 | End: 2021-02-25

## 2019-12-26 RX ORDER — OSELTAMIVIR PHOSPHATE 75 MG/1
75 CAPSULE ORAL 2 TIMES DAILY
Qty: 10 CAPSULE | Refills: 0 | Status: SHIPPED | OUTPATIENT
Start: 2019-12-26 | End: 2019-12-31

## 2019-12-26 NOTE — PATIENT INSTRUCTIONS
Tamiflu as directed, unless side effects are not tolerated then please stop medication and seek advisement of your PCP.  Increase fluids.  Rest.  Ibuprofen as directed for body aches.  Tylenol as directed for fever.  Handwashing.  Follow up with PCP as needed.  See handout sheet.  Return here or go to the ER for worsening symptoms.  The Flu (Influenza)     The virus that causes the flu spreads through the air in droplets when someone who has the flu coughs, sneezes, laughs, or talks.   The flu (influenza) is an infection that affects your respiratory tract. This tract is made up of your mouth, nose, and lungs, and the passages between them. Unlike a cold, the flu can make you very ill. And it can lead to pneumonia, a serious lung infection. The flu can have serious complications and even cause death.  Who is at risk for the flu?  Anyone can get the flu. But you are more likely to become infected if you:  · Have a weakened immune system  · Work in a healthcare setting where you may be exposed to flu germs  · Live or work with someone who has the flu  · Havent had an annual flu shot  How does the flu spread?  The flu is caused by a virus. The virus spreads through the air in droplets when someone who has the flu coughs, sneezes, laughs, or talks. You can become infected when you inhale these viruses directly. You can also become infected when you touch a surface on which the droplets have landed and then transfer the germs to your eyes, nose, or mouth. Touching used tissues, or sharing utensils, drinking glasses, or a toothbrush from an infected person can expose you to flu viruses, too.  What are the symptoms of the flu?  Flu symptoms tend to come on quickly and may last a few days to a few weeks. They include:  · Fever usually higher than 100.4°F  (38°C) and chills  · Sore throat and headache  · Dry cough  · Runny nose  · Tiredness and weakness  · Muscle aches  Who is at risk for flu complications?  For some  people, the flu can be very serious. The risk for complications is greater for:  · Children younger than age 5  · Adults ages 65 and older  · People with a chronic illness such as diabetes or heart, kidney, or lung disease  · People who live in a nursing home or long-term care facility   How is the flu treated?  The flu usually gets better after 7 days or so. In some cases, your healthcare provider may prescribe an antiviral medicine. This may help you get well a little sooner. For the medicine to help, you need to take it as soon as possible (ideally within 48 hours) after your symptoms start. If you develop pneumonia or other serious illness, you may need to stay in the hospital.  Easing flu symptoms  · Drink lots of fluids such as water, juice, and warm soup. A good rule is to drink enough so that you urinate your normal amount.  · Get plenty of rest.  · Ask your healthcare provider what to take for fever and pain.  · Call your provider if your fever is 100.4°F (38°C) or higher, or you become dizzy, lightheaded, or short of breath.  Taking steps to protect others  · Wash your hands often, especially after coughing or sneezing. Or clean your hands with an alcohol-based hand  containing at least 60% alcohol.  · Cough or sneeze into a tissue. Then throw the tissue away and wash your hands. If you dont have a tissue, cough and sneeze into your elbow.  · Stay home until at least 24 hours after you no longer have a fever or chills. Be sure the fever isnt being hidden by fever-reducing medicine.  · Dont share food, utensils, drinking glasses, or a toothbrush with others.  · Ask your healthcare provider if others in your household should get antiviral medicine to help them avoid infection.  How can the flu be prevented?  · One of the best ways to avoid the flu is to get a flu vaccine each year. The virus that causes the flu changes from year to year. For that reason, healthcare providers recommend getting the  flu vaccine each year, as soon as it's available in your area. The vaccine is given as a shot. Your healthcare provider can tell you which vaccine is right for you. A nasal spray is also available but is not recommended for the 6374-2728 flu season. The CDC says this is because the nasal spray did not seem to protect against the flu over the last several flu seasons. In the past, it was meant for people ages 2 to 49.  · Wash your hands often. Frequent handwashing is a proven way to help prevent infection.  · Carry an alcohol-based hand gel containing at least 60% alcohol. Use it when you can't use soap and water. Then wash your hands as soon as you can.  · Avoid touching your eyes, nose, and mouth.  · At home and work, clean phones, computer keyboards, and toys often with disinfectant wipes.  · If possible, avoid close contact with others who have the flu or symptoms of the flu.  Handwashing tips  Handwashing is one of the best ways to prevent many common infections. If you are caring for or visiting someone with the flu, wash your hands each time you enter and leave the room. Follow these steps:  · Use warm water and plenty of soap. Rub your hands together well.  · Clean the whole hand, including under your nails, between your fingers, and up the wrists.  · Wash for at least 15 seconds.  · Rinse, letting the water run down your fingers, not up your wrists.  · Dry your hands well. Use a paper towel to turn off the faucet and open the door.  Using alcohol-based hand   Alcohol-based hand  are also a good choice. Use them when you can't use soap and water. Follow these steps:  · Squeeze about a tablespoon of gel into the palm of one hand.  · Rub your hands together briskly, cleaning the backs of your hands, the palms, between your fingers, and up the wrists.  · Rub until the gel is gone and your hands are completely dry.  Preventing the flu in healthcare settings  The flu is a special concern for people  in hospitals and long-term care facilities. To help prevent the spread of flu, many hospitals and nursing homes take these steps:  · Healthcare providers wash their hands or use an alcohol-based hand  before and after treating each patient.  · People with the flu have private rooms and bathrooms or share a room with someone with the same infection.  · People who are at high risk for the flu but don't have it are encouraged to get the flu and pneumonia vaccines.  · All healthcare workers are encouraged or required to get flu shots.   Date Last Reviewed: 12/1/2016  © 3245-1426 [employer]. [address] [place] [address], [place] [address]. All rights reserved. This information is not intended as a substitute for professional medical care. Always follow your healthcare professional's instructions.

## 2019-12-26 NOTE — PROGRESS NOTES
"Subjective:       Patient ID: Nadine M Claude is a 49 y.o. female.    Vitals:  height is 5' 4" (1.626 m) and weight is 102.1 kg (225 lb). Her temperature is 99.3 °F (37.4 °C). Her blood pressure is 142/91 (abnormal) and her pulse is 87. Her respiration is 20 and oxygen saturation is 95%.     Chief Complaint: URI    Patient presents with cough, congestion, and chills since yesterday.  Her daughter tested positive for flu on Monday.  Denies fever.  She is taking Corcidin as needed for congestion.      URI    This is a new problem. The current episode started in the past 7 days (Monday ). The problem has been unchanged. There has been no fever. Associated symptoms include congestion and coughing. Pertinent negatives include no abdominal pain, chest pain, diarrhea, dysuria, ear pain, headaches, joint pain, joint swelling, nausea, neck pain, plugged ear sensation, rash, rhinorrhea, sinus pain, sneezing, sore throat, swollen glands, vomiting or wheezing. She has tried increased fluids for the symptoms.       Constitution: Positive for chills. Negative for sweating, fatigue and fever.   HENT: Positive for congestion. Negative for ear pain, sinus pain, sinus pressure, sore throat and voice change.    Neck: Negative for neck pain and painful lymph nodes.   Cardiovascular: Negative for chest pain.   Eyes: Negative for eye redness.   Respiratory: Positive for cough. Negative for chest tightness, sputum production, bloody sputum, COPD, shortness of breath, stridor, wheezing and asthma.    Gastrointestinal: Negative for abdominal pain, nausea, vomiting and diarrhea.   Genitourinary: Negative for dysuria.   Musculoskeletal: Negative for muscle ache.   Skin: Negative for rash.   Allergic/Immunologic: Negative for seasonal allergies, asthma and sneezing.   Neurological: Negative for headaches.   Hematologic/Lymphatic: Negative for swollen lymph nodes.       Objective:      Physical Exam   Constitutional: She is oriented to person, " place, and time. She appears well-developed and well-nourished. She is cooperative.  Non-toxic appearance. She does not have a sickly appearance. She does not appear ill. No distress.   HENT:   Head: Normocephalic and atraumatic.   Right Ear: Hearing, tympanic membrane, external ear and ear canal normal.   Left Ear: Hearing, tympanic membrane, external ear and ear canal normal.   Nose: Nose normal. No mucosal edema, rhinorrhea or nasal deformity. No epistaxis. Right sinus exhibits no maxillary sinus tenderness and no frontal sinus tenderness. Left sinus exhibits no maxillary sinus tenderness and no frontal sinus tenderness.   Mouth/Throat: Uvula is midline, oropharynx is clear and moist and mucous membranes are normal. No trismus in the jaw. Normal dentition. No uvula swelling. No oropharyngeal exudate, posterior oropharyngeal edema or posterior oropharyngeal erythema.   Eyes: Conjunctivae and lids are normal. No scleral icterus.   Neck: Trachea normal, full passive range of motion without pain and phonation normal. Neck supple. No neck rigidity. No edema and no erythema present.   Cardiovascular: Normal rate, regular rhythm, normal heart sounds, intact distal pulses and normal pulses.   Pulmonary/Chest: Effort normal and breath sounds normal. No respiratory distress. She has no decreased breath sounds. She has no rhonchi.   Abdominal: Normal appearance.   Musculoskeletal: Normal range of motion. She exhibits no edema or deformity.   Neurological: She is alert and oriented to person, place, and time. She exhibits normal muscle tone. Coordination normal.   Skin: Skin is warm, dry, intact, not diaphoretic and not pale.   Psychiatric: She has a normal mood and affect. Her speech is normal and behavior is normal. Judgment and thought content normal. Cognition and memory are normal.   Nursing note and vitals reviewed.    Results for orders placed or performed in visit on 12/26/19   POCT Influenza A/B   Result Value Ref  Range    Rapid Influenza A Ag Negative Negative    Rapid Influenza B Ag Positive (A) Negative     Acceptable Yes          Assessment:       1. Influenza B        Plan:         Influenza B  -     POCT Influenza A/B  -     oseltamivir (TAMIFLU) 75 MG capsule; Take 1 capsule (75 mg total) by mouth 2 (two) times daily. for 5 days  Dispense: 10 capsule; Refill: 0      Patient Instructions     Tamiflu as directed, unless side effects are not tolerated then please stop medication and seek advisement of your PCP.  Increase fluids.  Rest.  Ibuprofen as directed for body aches.  Tylenol as directed for fever.  Handwashing.  Follow up with PCP as needed.  See handout sheet.  Return here or go to the ER for worsening symptoms.  The Flu (Influenza)     The virus that causes the flu spreads through the air in droplets when someone who has the flu coughs, sneezes, laughs, or talks.   The flu (influenza) is an infection that affects your respiratory tract. This tract is made up of your mouth, nose, and lungs, and the passages between them. Unlike a cold, the flu can make you very ill. And it can lead to pneumonia, a serious lung infection. The flu can have serious complications and even cause death.  Who is at risk for the flu?  Anyone can get the flu. But you are more likely to become infected if you:  · Have a weakened immune system  · Work in a healthcare setting where you may be exposed to flu germs  · Live or work with someone who has the flu  · Havent had an annual flu shot  How does the flu spread?  The flu is caused by a virus. The virus spreads through the air in droplets when someone who has the flu coughs, sneezes, laughs, or talks. You can become infected when you inhale these viruses directly. You can also become infected when you touch a surface on which the droplets have landed and then transfer the germs to your eyes, nose, or mouth. Touching used tissues, or sharing utensils, drinking glasses, or a  toothbrush from an infected person can expose you to flu viruses, too.  What are the symptoms of the flu?  Flu symptoms tend to come on quickly and may last a few days to a few weeks. They include:  · Fever usually higher than 100.4°F  (38°C) and chills  · Sore throat and headache  · Dry cough  · Runny nose  · Tiredness and weakness  · Muscle aches  Who is at risk for flu complications?  For some people, the flu can be very serious. The risk for complications is greater for:  · Children younger than age 5  · Adults ages 65 and older  · People with a chronic illness such as diabetes or heart, kidney, or lung disease  · People who live in a nursing home or long-term care facility   How is the flu treated?  The flu usually gets better after 7 days or so. In some cases, your healthcare provider may prescribe an antiviral medicine. This may help you get well a little sooner. For the medicine to help, you need to take it as soon as possible (ideally within 48 hours) after your symptoms start. If you develop pneumonia or other serious illness, you may need to stay in the hospital.  Easing flu symptoms  · Drink lots of fluids such as water, juice, and warm soup. A good rule is to drink enough so that you urinate your normal amount.  · Get plenty of rest.  · Ask your healthcare provider what to take for fever and pain.  · Call your provider if your fever is 100.4°F (38°C) or higher, or you become dizzy, lightheaded, or short of breath.  Taking steps to protect others  · Wash your hands often, especially after coughing or sneezing. Or clean your hands with an alcohol-based hand  containing at least 60% alcohol.  · Cough or sneeze into a tissue. Then throw the tissue away and wash your hands. If you dont have a tissue, cough and sneeze into your elbow.  · Stay home until at least 24 hours after you no longer have a fever or chills. Be sure the fever isnt being hidden by fever-reducing medicine.  · Dont share food,  utensils, drinking glasses, or a toothbrush with others.  · Ask your healthcare provider if others in your household should get antiviral medicine to help them avoid infection.  How can the flu be prevented?  · One of the best ways to avoid the flu is to get a flu vaccine each year. The virus that causes the flu changes from year to year. For that reason, healthcare providers recommend getting the flu vaccine each year, as soon as it's available in your area. The vaccine is given as a shot. Your healthcare provider can tell you which vaccine is right for you. A nasal spray is also available but is not recommended for the 8172-9013 flu season. The CDC says this is because the nasal spray did not seem to protect against the flu over the last several flu seasons. In the past, it was meant for people ages 2 to 49.  · Wash your hands often. Frequent handwashing is a proven way to help prevent infection.  · Carry an alcohol-based hand gel containing at least 60% alcohol. Use it when you can't use soap and water. Then wash your hands as soon as you can.  · Avoid touching your eyes, nose, and mouth.  · At home and work, clean phones, computer keyboards, and toys often with disinfectant wipes.  · If possible, avoid close contact with others who have the flu or symptoms of the flu.  Handwashing tips  Handwashing is one of the best ways to prevent many common infections. If you are caring for or visiting someone with the flu, wash your hands each time you enter and leave the room. Follow these steps:  · Use warm water and plenty of soap. Rub your hands together well.  · Clean the whole hand, including under your nails, between your fingers, and up the wrists.  · Wash for at least 15 seconds.  · Rinse, letting the water run down your fingers, not up your wrists.  · Dry your hands well. Use a paper towel to turn off the faucet and open the door.  Using alcohol-based hand   Alcohol-based hand  are also a good  choice. Use them when you can't use soap and water. Follow these steps:  · Squeeze about a tablespoon of gel into the palm of one hand.  · Rub your hands together briskly, cleaning the backs of your hands, the palms, between your fingers, and up the wrists.  · Rub until the gel is gone and your hands are completely dry.  Preventing the flu in healthcare settings  The flu is a special concern for people in hospitals and long-term care facilities. To help prevent the spread of flu, many hospitals and nursing homes take these steps:  · Healthcare providers wash their hands or use an alcohol-based hand  before and after treating each patient.  · People with the flu have private rooms and bathrooms or share a room with someone with the same infection.  · People who are at high risk for the flu but don't have it are encouraged to get the flu and pneumonia vaccines.  · All healthcare workers are encouraged or required to get flu shots.   Date Last Reviewed: 12/1/2016  © 0747-9471 [employer]. [address] [place] [address], [place] [address]. All rights reserved. This information is not intended as a substitute for professional medical care. Always follow your healthcare professional's instructions.

## 2020-02-06 ENCOUNTER — IMMUNIZATION (OUTPATIENT)
Dept: PHARMACY | Facility: CLINIC | Age: 50
End: 2020-02-06
Payer: COMMERCIAL

## 2020-02-06 ENCOUNTER — OFFICE VISIT (OUTPATIENT)
Dept: INTERNAL MEDICINE | Facility: CLINIC | Age: 50
End: 2020-02-06
Payer: COMMERCIAL

## 2020-02-06 ENCOUNTER — LAB VISIT (OUTPATIENT)
Dept: LAB | Facility: HOSPITAL | Age: 50
End: 2020-02-06
Payer: COMMERCIAL

## 2020-02-06 VITALS
DIASTOLIC BLOOD PRESSURE: 68 MMHG | BODY MASS INDEX: 38.62 KG/M2 | SYSTOLIC BLOOD PRESSURE: 122 MMHG | HEIGHT: 64 IN | WEIGHT: 226.19 LBS

## 2020-02-06 DIAGNOSIS — E66.9 OBESITY (BMI 30-39.9): ICD-10-CM

## 2020-02-06 DIAGNOSIS — R53.83 FATIGUE, UNSPECIFIED TYPE: ICD-10-CM

## 2020-02-06 DIAGNOSIS — I10 HYPERTENSION, UNSPECIFIED TYPE: ICD-10-CM

## 2020-02-06 DIAGNOSIS — K21.9 GASTROESOPHAGEAL REFLUX DISEASE WITHOUT ESOPHAGITIS: ICD-10-CM

## 2020-02-06 DIAGNOSIS — E66.9 OBESITY, UNSPECIFIED CLASSIFICATION, UNSPECIFIED OBESITY TYPE, UNSPECIFIED WHETHER SERIOUS COMORBIDITY PRESENT: ICD-10-CM

## 2020-02-06 DIAGNOSIS — R06.83 SNORING: Primary | ICD-10-CM

## 2020-02-06 PROCEDURE — 99213 PR OFFICE/OUTPT VISIT, EST, LEVL III, 20-29 MIN: ICD-10-PCS | Mod: S$GLB,,, | Performed by: STUDENT IN AN ORGANIZED HEALTH CARE EDUCATION/TRAINING PROGRAM

## 2020-02-06 PROCEDURE — 99213 OFFICE O/P EST LOW 20 MIN: CPT | Mod: S$GLB,,, | Performed by: STUDENT IN AN ORGANIZED HEALTH CARE EDUCATION/TRAINING PROGRAM

## 2020-02-06 PROCEDURE — 84443 ASSAY THYROID STIM HORMONE: CPT

## 2020-02-06 PROCEDURE — 99999 PR PBB SHADOW E&M-EST. PATIENT-LVL III: ICD-10-PCS | Mod: PBBFAC,,, | Performed by: STUDENT IN AN ORGANIZED HEALTH CARE EDUCATION/TRAINING PROGRAM

## 2020-02-06 PROCEDURE — 36415 COLL VENOUS BLD VENIPUNCTURE: CPT

## 2020-02-06 PROCEDURE — 99999 PR PBB SHADOW E&M-EST. PATIENT-LVL III: CPT | Mod: PBBFAC,,, | Performed by: STUDENT IN AN ORGANIZED HEALTH CARE EDUCATION/TRAINING PROGRAM

## 2020-02-06 RX ORDER — BUPROPION HYDROCHLORIDE 100 MG/1
100 TABLET ORAL 2 TIMES DAILY
COMMUNITY
End: 2020-02-18 | Stop reason: SDUPTHER

## 2020-02-06 NOTE — PATIENT INSTRUCTIONS
Please schedule sleep study. Please continue to diet and exercise. Have a good week and I will see you in 4 months

## 2020-02-07 ENCOUNTER — PATIENT MESSAGE (OUTPATIENT)
Dept: INTERNAL MEDICINE | Facility: CLINIC | Age: 50
End: 2020-02-07

## 2020-02-07 LAB — TSH SERPL DL<=0.005 MIU/L-ACNC: 1.89 UIU/ML (ref 0.4–4)

## 2020-02-07 RX ORDER — SCOLOPAMINE TRANSDERMAL SYSTEM 1 MG/1
1 PATCH, EXTENDED RELEASE TRANSDERMAL
Qty: 10 PATCH | Refills: 0 | Status: SHIPPED | OUTPATIENT
Start: 2020-02-07 | End: 2020-08-18

## 2020-02-07 NOTE — PROGRESS NOTES
Subjective     Chief Complaint: annual physical     History of Present Illness:  Ms. Nadine M Claude is a 49 y.o. female with PMH of hypertension, depression, and GERD who presents to clinic for annual exam. Patient primary concern is that she snores at night and does not sleep well secondary to waking up gasping for breath. Her  is concerned about this and she is asking for a sleep study. She also reports fatigue, dry skin, and constipation recently. No unintentional weight loss or hot/cold intolerance. For her HTN she takes HCTZ 12.5mg daily and her BP here today is 122/68. Takes Wellbutrin 100mg BID for depression, and Ambien for sleep. She denies chest pain, sob, n/v/d, worsening depression, or lower extremity edema    She does not exercise or eat healthy which she wants to change.     Review of Systems   Constitutional: Positive for malaise/fatigue. Negative for fever and weight loss.   HENT: Negative for congestion and sore throat.    Eyes: Negative for blurred vision and pain.   Respiratory: Negative for cough and shortness of breath.         Snoring, PND   Cardiovascular: Negative for chest pain, orthopnea and leg swelling.   Gastrointestinal: Positive for constipation. Negative for abdominal pain, diarrhea, nausea and vomiting.   Genitourinary: Negative for dysuria and hematuria.   Musculoskeletal: Negative for back pain and neck pain.   Skin:        Dry skin   Neurological: Negative for weakness and headaches.   Psychiatric/Behavioral: Negative for depression and suicidal ideas. The patient is not nervous/anxious.        PAST HISTORY:     Past Medical History:   Diagnosis Date    Back pain     Depression     GERD (gastroesophageal reflux disease)     Hypertension     Miscarriage        Past Surgical History:   Procedure Laterality Date    breast augmentation      DILATION AND CURETTAGE OF UTERUS      heel spurs  11/2018    TOTAL REDUCTION MAMMOPLASTY Bilateral 1994       Family History    Problem Relation Age of Onset    Colon cancer Paternal Grandfather     Diabetes Mother     Heart disease Father     Aortic aneurysm Brother     Cerebral aneurysm Maternal Aunt     Diabetes Maternal Grandfather        Social History     Socioeconomic History    Marital status:      Spouse name: Not on file    Number of children: Not on file    Years of education: Not on file    Highest education level: Not on file   Occupational History    Not on file   Social Needs    Financial resource strain: Not on file    Food insecurity:     Worry: Not on file     Inability: Not on file    Transportation needs:     Medical: Not on file     Non-medical: Not on file   Tobacco Use    Smoking status: Never Smoker    Smokeless tobacco: Never Used   Substance and Sexual Activity    Alcohol use: Yes     Comment: once a month    Drug use: Yes     Types: Marijuana     Comment: once a month    Sexual activity: Yes     Partners: Male     Comment: marrie   Lifestyle    Physical activity:     Days per week: Not on file     Minutes per session: Not on file    Stress: Not on file   Relationships    Social connections:     Talks on phone: Not on file     Gets together: Not on file     Attends [employer] service: Not on file     Active member of club or organization: Not on file     Attends meetings of clubs or organizations: Not on file     Relationship status: Not on file   Other Topics Concern    Not on file   Social History Narrative    Patient is  with 2 children. Does not exercise and eats unhealthy diet. She plans to start exercising at her gym.        MEDICATIONS & ALLERGIES:     Current Outpatient Medications on File Prior to Visit   Medication Sig    buPROPion (WELLBUTRIN) 100 MG tablet Take 100 mg by mouth 2 (two) times daily.    hydroCHLOROthiazide (HYDRODIURIL) 12.5 MG Tab Take 12.5 mg by mouth once daily.    zolpidem (AMBIEN) 10 mg Tab      No current facility-administered medications on  "file prior to visit.        Review of patient's allergies indicates:   Allergen Reactions    Codeine Hallucinations       OBJECTIVE:     Vital Signs:  Vitals:    02/06/20 1545   BP: 122/68   BP Location: Right arm   Patient Position: Sitting   BP Method: Large (Manual)   Weight: 102.6 kg (226 lb 3.1 oz)   Height: 5' 4" (1.626 m)       Body mass index is 38.83 kg/m².     Physical Exam   Constitutional: She is oriented to person, place, and time and well-developed, well-nourished, and in no distress.   obese   HENT:   Head: Normocephalic and atraumatic.   No lateral eyebrow thinning or dry skin noted   Eyes: Conjunctivae are normal.   Neck: Normal range of motion.   Cardiovascular: Normal rate, regular rhythm, normal heart sounds and intact distal pulses.   Pulmonary/Chest: Effort normal and breath sounds normal. No respiratory distress. She has no wheezes.   Abdominal: Soft. She exhibits no distension. There is no tenderness.   Musculoskeletal: Normal range of motion. She exhibits no edema.   Neurological: She is alert and oriented to person, place, and time.   Psychiatric: Affect normal.       Laboratory  Lab Results   Component Value Date    WBC 8.74 09/10/2018    HGB 13.4 09/10/2018    HCT 40.0 09/10/2018    MCV 88 09/10/2018     09/10/2018     BMP  Lab Results   Component Value Date     09/10/2018    K 4.5 09/10/2018     09/10/2018    CO2 28 09/10/2018    BUN 12 09/10/2018    CREATININE 0.7 09/10/2018    CALCIUM 10.0 09/10/2018    ANIONGAP 6 (L) 09/10/2018    ESTGFRAFRICA >60.0 09/10/2018    EGFRNONAA >60.0 09/10/2018     No results found for: INR, PROTIME  No results found for: HGBA1C  No results for input(s): POCTGLUCOSE in the last 72 hours.    There are no preventive care reminders to display for this patient.      ASSESSMENT & PLAN:   Ms. Nadine M Claude is a 49 y.o. female here to establish care. Symptoms concerning for BRYCE, will obtain sleep study. Also, symptoms of hypothyroid, will " obtain TSH screen.     Snoring  -     Home Sleep Studies; Future    Fatigue, unspecified type  -     TSH; Future; Expected date: 02/06/2020    Obesity, unspecified classification, unspecified obesity type, unspecified whether serious comorbidity present  -     Ambulatory referral/consult to [facility]); Future; Expected date: 02/13/2020    Obesity (BMI 30-39.9)  - encouraged diet and exercise. She is now a member at a gym     Gastroesophageal reflux disease  - not on H2 or PPI, symptoms well controlled     Hypertension, unspecified type  - BP well controlled here today in clinic  - continue HCTZ 12.5mg qd    Other orders  -     [facility] ([facility])  -     [facility] Patient Entered [facility] ([facility])        RTC in 4 months     Discussed with Dr. Meyer  - staff attestation to follow      Angel Bolaños MD  Internal Medicine PGY1  [facility]  [address] [place] [address]  [phone]

## 2020-02-18 ENCOUNTER — PATIENT MESSAGE (OUTPATIENT)
Dept: INTERNAL MEDICINE | Facility: CLINIC | Age: 50
End: 2020-02-18

## 2020-02-18 RX ORDER — BUPROPION HYDROCHLORIDE 100 MG/1
100 TABLET ORAL 2 TIMES DAILY
Qty: 60 TABLET | Refills: 3 | Status: SHIPPED | OUTPATIENT
Start: 2020-02-18 | End: 2021-01-21

## 2020-02-18 RX ORDER — HYDROCHLOROTHIAZIDE 12.5 MG/1
12.5 TABLET ORAL DAILY
Qty: 30 TABLET | Refills: 3 | Status: SHIPPED | OUTPATIENT
Start: 2020-02-18 | End: 2020-08-18

## 2020-02-18 NOTE — TELEPHONE ENCOUNTER
----- Message from Jay John sent at 2/18/2020 11:39 AM CST -----  Contact: Self [phone]  Patient is calling for an RX refill or new RX.  Is this a refill or new RX:  refill  RX name and strength: hydroCHLOROthiazide (HYDRODIURIL) 12.5 MG Tab  Directions (copy/paste from chart):    Is this a 30 day or 90 day RX:  90 day   Local pharmacy or mail order pharmacy:  local  Pharmacy name and phone # (copy/paste from chart):[facility] #[id number] [place], [place] - [address] AT [facility] [employer] [phone] (Phone)  [phone] (Fax)     Comments:      Patient is calling for an RX refill or new RX.  Is this a refill or new RX:  refill  RX name and strength: buPROPion (WELLBUTRIN) 100 MG tablet  Directions (copy/paste from chart):    Is this a 30 day or 90 day RX:  90 day   Local pharmacy or mail order pharmacy:  local  Pharmacy name and phone # (copy/paste from chart):  [facility] #[id number], [place] - [address] AT [facility] [employer] [phone] (Phone)  [phone] (Fax)   Comments:

## 2020-03-05 ENCOUNTER — TELEPHONE (OUTPATIENT)
Dept: SLEEP MEDICINE | Facility: OTHER | Age: 50
End: 2020-03-05

## 2020-03-09 ENCOUNTER — TELEPHONE (OUTPATIENT)
Dept: SLEEP MEDICINE | Facility: OTHER | Age: 50
End: 2020-03-09

## 2020-03-09 ENCOUNTER — OFFICE VISIT (OUTPATIENT)
Dept: INTERNAL MEDICINE | Facility: CLINIC | Age: 50
End: 2020-03-09
Payer: COMMERCIAL

## 2020-03-09 VITALS
SYSTOLIC BLOOD PRESSURE: 140 MMHG | WEIGHT: 229.5 LBS | HEART RATE: 101 BPM | HEIGHT: 64 IN | BODY MASS INDEX: 39.18 KG/M2 | OXYGEN SATURATION: 98 % | DIASTOLIC BLOOD PRESSURE: 88 MMHG

## 2020-03-09 DIAGNOSIS — L98.9 SKIN LESION OF LEFT ARM: Primary | ICD-10-CM

## 2020-03-09 PROCEDURE — 99999 PR PBB SHADOW E&M-EST. PATIENT-LVL IV: CPT | Mod: PBBFAC,,, | Performed by: STUDENT IN AN ORGANIZED HEALTH CARE EDUCATION/TRAINING PROGRAM

## 2020-03-09 PROCEDURE — 99213 PR OFFICE/OUTPT VISIT, EST, LEVL III, 20-29 MIN: ICD-10-PCS | Mod: S$GLB,,, | Performed by: STUDENT IN AN ORGANIZED HEALTH CARE EDUCATION/TRAINING PROGRAM

## 2020-03-09 PROCEDURE — 99213 OFFICE O/P EST LOW 20 MIN: CPT | Mod: S$GLB,,, | Performed by: STUDENT IN AN ORGANIZED HEALTH CARE EDUCATION/TRAINING PROGRAM

## 2020-03-09 PROCEDURE — 99999 PR PBB SHADOW E&M-EST. PATIENT-LVL IV: ICD-10-PCS | Mod: PBBFAC,,, | Performed by: STUDENT IN AN ORGANIZED HEALTH CARE EDUCATION/TRAINING PROGRAM

## 2020-03-09 RX ORDER — DIPHENHYDRAMINE HCL 25 MG
25 CAPSULE ORAL EVERY 6 HOURS PRN
Qty: 10 CAPSULE | Refills: 0 | Status: SHIPPED | OUTPATIENT
Start: 2020-03-09 | End: 2020-08-18

## 2020-03-09 NOTE — PROGRESS NOTES
Internal Medicine Resident Clinic Note  3/9/2020      Subjective:       Patient ID:  Araceli is a 49 y.o. female being seen for an established visit.    Chief Complaint: itchy bumps (both arms)    Ms. Claude is a 49 year old woman with HTN, depression, and GERD who presents with redness and itching of both arms. She had two right arm bug bites last Sunday and then woke up with a few right red bumps that showed up yesterday morning. She says they itch and hurt. She has put Monistat on it but did not help. She is not allergic to anything. She has not used any new detergents, soaps, clothing. She has not come into contact with any plants or new animals (has had 2 dogs for the past 2 years). She has not been around nature or gone hiking recently. She has not been sick recently. She not taken any new medications. She reports associated dry skin but denies fevers, recent illness. She is up to date on her vaccinations.       Review of Systems   Constitutional: Negative for chills and fever.   HENT: Negative for congestion and sore throat.    Eyes: Negative for photophobia and pain.   Respiratory: Negative for cough and shortness of breath.    Cardiovascular: Negative for chest pain and leg swelling.   Gastrointestinal: Negative for abdominal pain and nausea.   Genitourinary: Negative for dysuria and flank pain.   Musculoskeletal: Negative for joint pain and myalgias.   Skin: Positive for itching.   Neurological: Negative for dizziness and headaches.   Psychiatric/Behavioral: Negative for depression. The patient is not nervous/anxious.        Past Medical History:   Diagnosis Date    Back pain     Depression     GERD (gastroesophageal reflux disease)     Hypertension     Miscarriage        Family History   Problem Relation Age of Onset    Colon cancer Paternal Grandfather     Diabetes Mother     Heart disease Father     Aortic aneurysm Brother     Cerebral aneurysm Maternal Aunt     Diabetes Maternal Grandfather   "       reports that she has never smoked. She has never used smokeless tobacco. She reports that she drinks alcohol. She reports that she has current or past drug history. Drug: Marijuana.    Medication List with Changes/Refills   New Medications    DIPHENHYDRAMINE (BENADRYL) 25 MG CAPSULE    Take 1 capsule (25 mg total) by mouth every 6 (six) hours as needed for Itching.    DIPHENHYDRAMINE-ZINC ACETATE 1-0.1% (BENADRYL ITCH STOPPING) CREAM    Apply topically 3 (three) times daily as needed for Itching.   Current Medications    BUPROPION (WELLBUTRIN) 100 MG TABLET    Take 1 tablet (100 mg total) by mouth 2 (two) times daily.    HYDROCHLOROTHIAZIDE (HYDRODIURIL) 12.5 MG TAB    Take 1 tablet (12.5 mg total) by mouth once daily.    SCOPOLAMINE (TRANSDERM-SCOP) 1.3-1.5 MG (1 MG OVER 3 DAYS)    Place 1 patch onto the skin every 72 hours.    ZOLPIDEM (AMBIEN) 10 MG TAB         Review of patient's allergies indicates:   Allergen Reactions    Codeine Hallucinations       Patient Active Problem List   Diagnosis    Diverticulosis    Essential hypertension    Gastroesophageal reflux disease    Obesity (BMI 30-39.9)    Intermittent asthma    Insomnia           Objective:      BP (!) 140/88 (BP Location: Right arm, Patient Position: Sitting, BP Method: Large (Manual))   Pulse 101   Ht 5' 4" (1.626 m)   Wt 104.1 kg (229 lb 8 oz)   SpO2 98%   BMI 39.39 kg/m²   Estimated body mass index is 39.39 kg/m² as calculated from the following:    Height as of this encounter: 5' 4" (1.626 m).    Weight as of this encounter: 104.1 kg (229 lb 8 oz).     Physical Exam   Constitutional: She is oriented to person, place, and time and well-developed, well-nourished, and in no distress. No distress.   HENT:   Head: Normocephalic and atraumatic.   Eyes: EOM are normal.   Neck: Normal range of motion. Neck supple.   Cardiovascular: Normal rate and regular rhythm.   No murmur heard.  Pulmonary/Chest: Effort normal and breath sounds normal. " No respiratory distress.   Abdominal: Soft. Bowel sounds are normal. She exhibits no distension. There is no tenderness.   Musculoskeletal: Normal range of motion. She exhibits no edema.   Neurological: She is alert and oriented to person, place, and time.   Skin: Skin is warm and dry. There is erythema.   4 erythematous [place]-like lesions on left arm (see media for photo)   Psychiatric: Affect and judgment normal.         Assessment and Plan:         Araceli was seen today for itchy bumps.    Diagnoses and all orders for this visit:    Skin lesion of left arm  -     diphenhydrAMINE-zinc acetate 1-0.1% (BENADRYL ITCH STOPPING) cream; Apply topically 3 (three) times daily as needed for Itching.  -     diphenhydrAMINE (BENADRYL) 25 mg capsule; Take 1 capsule (25 mg total) by mouth every 6 (six) hours as needed for Itching.    Lesions possibly due to bug bites vs erythema multiforme (ie possible viral manifestation). Patient denies recent new exposures or new medications. Encouraged patient to return to clinic if lesions worsen or she develops new ones anywhere else on her body. Patient was wondering about need for biopsy. Discussed that Dermatology would biopsy skin lesion and can consider Dermatology referral if lesions do not improve. She was also concerned that they were infected but no drainage noted, and patient has not had any recent fevers, recent illness. Encouraged patient to return if she does noticed drainage or develops systemic symptoms. Also encouraged supportive management (ie Benadryl for itching) and monitor lesions for about a week with instructions as noted above.     Follow up if symptoms worsen or fail to improve.    Other Orders Placed This Visit:  No orders of the defined types were placed in this encounter.          Patient seen and discussed with Dr. Mcpherson.        Natty Bernal MD PGY2  [facility]  Internal Medicine

## 2020-03-30 ENCOUNTER — HOSPITAL ENCOUNTER (OUTPATIENT)
Dept: SLEEP MEDICINE | Facility: OTHER | Age: 50
Discharge: HOME | End: 2020-03-30
Attending: STUDENT IN AN ORGANIZED HEALTH CARE EDUCATION/TRAINING PROGRAM
Payer: COMMERCIAL

## 2020-03-30 DIAGNOSIS — G47.33 OSA (OBSTRUCTIVE SLEEP APNEA): Primary | ICD-10-CM

## 2020-03-30 DIAGNOSIS — R06.83 SNORING: ICD-10-CM

## 2020-03-30 PROCEDURE — 95800 SLP STDY UNATTENDED: CPT

## 2020-04-01 PROCEDURE — 95800 SLP STDY UNATTENDED: CPT | Mod: 26,,, | Performed by: INTERNAL MEDICINE

## 2020-04-01 PROCEDURE — 95800 PR SLEEP STUDY, UNATTENDED, RECORD HEART RATE/O2 SAT/RESP ANAL/SLEEP TIME: ICD-10-PCS | Mod: 26,,, | Performed by: INTERNAL MEDICINE

## 2020-04-27 ENCOUNTER — TELEPHONE (OUTPATIENT)
Dept: INTERNAL MEDICINE | Facility: CLINIC | Age: 50
End: 2020-04-27

## 2020-04-27 NOTE — TELEPHONE ENCOUNTER
----- Message from Angelique Rowland sent at 4/27/2020  3:28 PM CDT -----  Contact: Self Call  [phone]  Calling to get test results.  Name of test (lab, xray, etc.):   Sleep Study  Date of test:  3/30/20  Ordering provider: Dr Bolaños  Where was the test performed:  [facility]  Would the patient rather a call back or a response via [facility]?:  Phone   Comments:

## 2020-04-29 ENCOUNTER — TELEPHONE (OUTPATIENT)
Dept: INTERNAL MEDICINE | Facility: CLINIC | Age: 50
End: 2020-04-29

## 2020-04-29 NOTE — TELEPHONE ENCOUNTER
"----- Message from Manuel Kenyon sent at 4/29/2020  2:45 PM CDT -----  Contact: Patient [phone]  Patient would like to get medical advice.    Comments: Patient calling regarding update on request for test results from "Sleep Study" still not able to sleep day or night, call to discuss.    Please call an advise  Thank you    "

## 2020-05-07 ENCOUNTER — TELEPHONE (OUTPATIENT)
Dept: INTERNAL MEDICINE | Facility: CLINIC | Age: 50
End: 2020-05-07

## 2020-05-07 NOTE — TELEPHONE ENCOUNTER
----- Message from Diana Michelle sent at 5/6/2020  2:22 PM CDT -----  Contact: [phone]  Calling to get test results.  Name of test (lab, xray, etc.):   Sleep Study  Date of test:  03-30-20  Ordering provider:   Where was the test performed:  [facility]  Would the patient rather a call back or a response via [facility]?:  Please call  Comments:

## 2020-05-12 ENCOUNTER — TELEPHONE (OUTPATIENT)
Dept: INTERNAL MEDICINE | Facility: CLINIC | Age: 50
End: 2020-05-12

## 2020-05-12 DIAGNOSIS — G47.33 OSA (OBSTRUCTIVE SLEEP APNEA): Primary | ICD-10-CM

## 2020-05-19 ENCOUNTER — TELEPHONE (OUTPATIENT)
Dept: SLEEP MEDICINE | Facility: CLINIC | Age: 50
End: 2020-05-19

## 2020-05-20 DIAGNOSIS — J45.20 MILD INTERMITTENT ASTHMA WITHOUT COMPLICATION: ICD-10-CM

## 2020-05-20 DIAGNOSIS — I10 ESSENTIAL HYPERTENSION: ICD-10-CM

## 2020-05-20 DIAGNOSIS — E66.9 OBESITY (BMI 30-39.9): ICD-10-CM

## 2020-05-20 DIAGNOSIS — G47.33 OSA (OBSTRUCTIVE SLEEP APNEA): Primary | ICD-10-CM

## 2020-05-20 DIAGNOSIS — G47.01 INSOMNIA DUE TO MEDICAL CONDITION: ICD-10-CM

## 2020-06-27 ENCOUNTER — HOSPITAL ENCOUNTER (EMERGENCY)
Facility: HOSPITAL | Age: 50
Discharge: HOME | End: 2020-06-27
Attending: EMERGENCY MEDICINE
Payer: COMMERCIAL

## 2020-06-27 ENCOUNTER — NURSE TRIAGE (OUTPATIENT)
Dept: ADMINISTRATIVE | Facility: CLINIC | Age: 50
End: 2020-06-27

## 2020-06-27 VITALS
BODY MASS INDEX: 37.65 KG/M2 | TEMPERATURE: 99 F | HEART RATE: 101 BPM | SYSTOLIC BLOOD PRESSURE: 133 MMHG | DIASTOLIC BLOOD PRESSURE: 85 MMHG | HEIGHT: 65 IN | OXYGEN SATURATION: 95 % | RESPIRATION RATE: 16 BRPM | WEIGHT: 226 LBS

## 2020-06-27 DIAGNOSIS — R10.32 LEFT LOWER QUADRANT PAIN: Primary | ICD-10-CM

## 2020-06-27 DIAGNOSIS — K57.90 DIVERTICULOSIS: ICD-10-CM

## 2020-06-27 PROCEDURE — 99284 EMERGENCY DEPT VISIT MOD MDM: CPT

## 2020-06-27 PROCEDURE — 99284 PR EMERGENCY DEPT VISIT,LEVEL IV: ICD-10-PCS | Mod: ,,, | Performed by: PHYSICIAN ASSISTANT

## 2020-06-27 PROCEDURE — 99284 EMERGENCY DEPT VISIT MOD MDM: CPT | Mod: ,,, | Performed by: PHYSICIAN ASSISTANT

## 2020-06-27 RX ORDER — CIPROFLOXACIN 500 MG/1
500 TABLET ORAL 2 TIMES DAILY
Qty: 20 TABLET | Refills: 0 | Status: SHIPPED | OUTPATIENT
Start: 2020-06-27 | End: 2020-07-07

## 2020-06-27 RX ORDER — METRONIDAZOLE 500 MG/1
500 TABLET ORAL 3 TIMES DAILY
Qty: 30 TABLET | Refills: 0 | Status: SHIPPED | OUTPATIENT
Start: 2020-06-27 | End: 2020-07-07

## 2020-06-27 NOTE — TELEPHONE ENCOUNTER
"Pt reports generalized lower abdominal pain x 6 hours. Hx of diverticulitis. She was advised, per protocol to be seen within 4 hours. She verbalizes understanding but would like follow up for antibiotics.    Reason for Disposition   [1] MILD-MODERATE pain AND [2] constant AND [3] present > 2 hours    Additional Information   Negative: Difficult to awaken or acting confused (e.g., disoriented, slurred speech)   Negative: Shock suspected (e.g., cold/pale/clammy skin, too weak to stand, low BP, rapid pulse)   Negative: Passed out (i.e., lost consciousness, collapsed and was not responding)   Negative: Sounds like a life-threatening emergency to the triager   Negative: [1] SEVERE pain AND [2] age > 60   Negative: [1] SEVERE pain (e.g., excruciating) AND [2] present > 1 hour   Negative: [1] Vomiting AND [2] contains red blood or black ("coffee ground") material  (Exception: few red streaks in vomit that only happened once)   Negative: Blood in bowel movements   (Exception: blood on surface of BM with constipation)   Negative: Black or tarry bowel movements  (Exception: chronic-unchanged  black-grey bowel movements AND is taking iron pills or Pepto-bismol)   Negative: Patient sounds very sick or weak to the triager    Protocols used: ABDOMINAL PAIN - FEMALE-A-AH      "

## 2020-06-28 NOTE — ED PROVIDER NOTES
"Encounter Date: 6/27/2020       History     Chief Complaint   Patient presents with    Abdominal Pain     Pt c/o abdominal pain and diarrhea. "I think my diverticulitis is flaring up" Denies fever.      9:00 PM  Patient is a 49-year-old female with a history of HTN, GERD, back pain, sigmoid diverticulitis who presents the ED with left lower quadrant pain and changes in her bowel movement.  Patient states that she noted her symptoms today.  Her left lower quadrant pain at worse is 4/10.  Currently, she denies any pain at this time.  She states that she noticed it when she moves around or sits for a prolonged period of time.  She states for the past 2 days she has noted changes in her bowel movement with intermittent loose stools with constipation.  She had 2 episodes with small blood streaks.  She denies noticing any bright red blood in her underwear.  She denies any fever, chills, diaphoresis, nausea, vomiting, dysuria, frequency, hematuria.  Patient is concerned because the 1st time she developed diverticulitis, she was ill for 4 days.  She states that the symptoms today are similar to her early episodes of diverticulitis.  She states that she contacted her PCP, but did not hear back from them yet which prompted her to come into the emergency department.    No future appointments.          Review of patient's allergies indicates:   Allergen Reactions    Codeine Hallucinations     Past Medical History:   Diagnosis Date    Back pain     Depression     GERD (gastroesophageal reflux disease)     Hypertension     Miscarriage      Past Surgical History:   Procedure Laterality Date    breast augmentation      DILATION AND CURETTAGE OF UTERUS      heel spurs  11/2018    TOTAL REDUCTION MAMMOPLASTY Bilateral 1994     Family History   Problem Relation Age of Onset    Colon cancer Paternal Grandfather     Diabetes Mother     Heart disease Father     Aortic aneurysm Brother     Cerebral aneurysm Maternal Aunt  "    Diabetes Maternal Grandfather      Social History     Tobacco Use    Smoking status: Never Smoker    Smokeless tobacco: Never Used   Substance Use Topics    Alcohol use: Yes     Comment: once a month    Drug use: Yes     Types: Marijuana     Comment: once a month     Review of Systems   Constitutional: Negative for chills, diaphoresis and fever.   HENT: Negative for sore throat.    Respiratory: Negative for shortness of breath.    Cardiovascular: Negative for chest pain.   Gastrointestinal: Positive for abdominal pain (LLQ, none now), constipation and diarrhea. Negative for nausea and vomiting.   Genitourinary: Negative for dysuria, frequency and hematuria.   Musculoskeletal: Negative for back pain.   Skin: Negative for rash.   Neurological: Negative for weakness.   Hematological: Does not bruise/bleed easily.       Physical Exam     Initial Vitals [06/27/20 2024]   BP Pulse Resp Temp SpO2   133/85 101 16 98.5 °F (36.9 °C) 95 %      MAP       --         Physical Exam    Vitals reviewed.  Constitutional: She appears well-developed and well-nourished. She is not diaphoretic. She is Obese . She is cooperative.  Non-toxic appearance. She does not have a sickly appearance. She does not appear ill. No distress. Face mask in place.   HENT:   Head: Normocephalic and atraumatic.   Nose: Nose normal.   Eyes: Conjunctivae and EOM are normal.   Neck: Normal range of motion.   Pulmonary/Chest: No respiratory distress.   Abdominal: Soft. She exhibits no distension and no mass. There is no abdominal tenderness. There is no rigidity, no rebound and no guarding.   Musculoskeletal: Normal range of motion.   Neurological: She is alert. She has normal strength.   Skin: Skin is warm and dry.   Skin to the area where she had pain without erythema, wounds, rashes, ecchymosis, or lesions.         ED Course   Procedures  Labs Reviewed - No data to display       Imaging Results    None          Medical Decision Making:   History:    Old Medical Records: I decided to obtain old medical records.  Old Records Summarized: records from clinic visits and records from previous admission(s).       <> Summary of Records: Last CT in 2017 showed showed sigmoid diverticulitis without complication.  Initial Assessment:   Patient is a 49-year-old female with a history of HTN, GERD, back pain, sigmoid diverticulitis who presents the ED with left lower quadrant pain onset today and changes in her bowel movement onset two days ago.  She reports streaks of blood in 2 of her bowel movements.  No active rectal bleeding.  Differential Diagnosis:   Includes but is not limited to diverticulitis, colitis, strain, sprain.  Skin without abnormalities.  No peritoneal signs.  I doubt complications of diverticulitis such as abscess, perforation, obstruction, volvulus.  Hemodynamically stable.  NAD.  I doubt active GI bleed or anemia.  Clinical Tests:   Lab Tests: Ordered  Radiological Study: Reviewed  ED Management:  I initially wanted to do and ordered blood work on patient to check basic labs, but patient wishes not to have any blood work done at this time.  She denies any pain at this moment.  She states that her symptoms resemble the symptoms that she had when she had diverticulitis in the past; last episodes in 2017 and 2016.  She is actually interested in only receiving antibiotics today in the emergency department.  She tried to contact her PCP, but did not hear back from them which prompted her to come into the emergency department tonight.  Patient appears intelligent and reliable.  We used shared decision making, and I feel that patient's request is reasonable.  Her vitals are stable.  She is well appearing.  Her abdominal exam is reassuring.  I doubt any acute surgical abdomen.  I will prescribe her Cipro and Flagyl for early diverticulitis.  She is to start if her pain returns.  She was given strict ED return precautions for signs and symptoms as discussed,  and she voices her understanding.  All of her questions were answered.  Patient comfortable with plan and stable for discharge.                                 Clinical Impression:       ICD-10-CM ICD-9-CM   1. Left lower quadrant pain  R10.32 789.04   2. Diverticulosis  K57.90 562.10             ED Disposition Condition    Discharge Stable        ED Prescriptions     Medication Sig Dispense Start Date End Date Auth. Provider    ciprofloxacin HCl (CIPRO) 500 MG tablet Take 1 tablet (500 mg total) by mouth 2 (two) times daily. for 10 days 20 tablet 6/27/2020 7/7/2020 Macarena López PA-C    metroNIDAZOLE (FLAGYL) 500 MG tablet Take 1 tablet (500 mg total) by mouth 3 (three) times daily. for 10 days 30 tablet 6/27/2020 7/7/2020 Macarena López PA-C        Follow-up Information     Follow up With Specialties Details Why Contact Info    Angel Bolaños MD Internal Medicine Schedule an appointment as soon as possible for a visit in 3 days  [address] [facility] [address]  [phone]      [facility] Emergency Medicine  If symptoms worsen [address] [facility] [id number] [phone]                                     Macarena López PA-C  06/27/20 2297

## 2020-06-28 NOTE — ED TRIAGE NOTES
"Hx of diverticulitis, abd pain today, diarrhea yesterday, "bright red," denies nausea or vomiting. She denies fevers at home, she admits to still being able to tolerate PO at home. C/o lower midline abd pain, that is intermittent, worse with activity and pulling or pushing.   "

## 2020-06-28 NOTE — DISCHARGE INSTRUCTIONS
Start Cipro 2 times a day which is every 12 hr and Flagyl 3 times a day which is every 8 hr for 10 days if your pain returns or if you notice persistent changes in your bowel movements, fever, chills, or sweats.  Do not drink alcohol if you are taking the antibiotics.  Follow-up with her primary care physician.  Return promptly to the emergency department for new or worsening symptoms despite being on antibiotics.    No future appointments.  Our goal in the emergency department is to always give you outstanding care and exceptional service. You may receive a survey by mail or e-mail in the next week regarding your experience in our ED. We would greatly appreciate your completing and returning the survey. Your feedback provides us with a way to recognize our staff who give very good care and it helps us learn how to improve when your experience was below our aspiration of excellence.

## 2020-06-29 NOTE — TELEPHONE ENCOUNTER
Seen in emergency room   And was given medication  Would you like to see her back in your clinic   Please advise

## 2020-08-17 ENCOUNTER — OFFICE VISIT (OUTPATIENT)
Dept: INTERNAL MEDICINE | Facility: CLINIC | Age: 50
End: 2020-08-17
Payer: COMMERCIAL

## 2020-08-17 ENCOUNTER — LAB VISIT (OUTPATIENT)
Dept: LAB | Facility: HOSPITAL | Age: 50
End: 2020-08-17
Payer: COMMERCIAL

## 2020-08-17 DIAGNOSIS — K92.1 BLOOD IN STOOL: ICD-10-CM

## 2020-08-17 DIAGNOSIS — M19.90 ARTHRITIS: Primary | ICD-10-CM

## 2020-08-17 DIAGNOSIS — E66.9 OBESITY, UNSPECIFIED CLASSIFICATION, UNSPECIFIED OBESITY TYPE, UNSPECIFIED WHETHER SERIOUS COMORBIDITY PRESENT: ICD-10-CM

## 2020-08-17 DIAGNOSIS — M19.90 ARTHRITIS: ICD-10-CM

## 2020-08-17 PROCEDURE — 36415 COLL VENOUS BLD VENIPUNCTURE: CPT

## 2020-08-17 PROCEDURE — 86431 RHEUMATOID FACTOR QUANT: CPT

## 2020-08-17 PROCEDURE — 86140 C-REACTIVE PROTEIN: CPT

## 2020-08-17 PROCEDURE — 99999 PR PBB SHADOW E&M-EST. PATIENT-LVL III: ICD-10-PCS | Mod: PBBFAC,,, | Performed by: STUDENT IN AN ORGANIZED HEALTH CARE EDUCATION/TRAINING PROGRAM

## 2020-08-17 PROCEDURE — 85025 COMPLETE CBC W/AUTO DIFF WBC: CPT

## 2020-08-17 PROCEDURE — 80053 COMPREHEN METABOLIC PANEL: CPT

## 2020-08-17 PROCEDURE — 85652 RBC SED RATE AUTOMATED: CPT

## 2020-08-17 PROCEDURE — 99214 OFFICE O/P EST MOD 30 MIN: CPT | Mod: S$GLB,,, | Performed by: STUDENT IN AN ORGANIZED HEALTH CARE EDUCATION/TRAINING PROGRAM

## 2020-08-17 PROCEDURE — 99999 PR PBB SHADOW E&M-EST. PATIENT-LVL III: CPT | Mod: PBBFAC,,, | Performed by: STUDENT IN AN ORGANIZED HEALTH CARE EDUCATION/TRAINING PROGRAM

## 2020-08-17 PROCEDURE — 99214 PR OFFICE/OUTPT VISIT, EST, LEVL IV, 30-39 MIN: ICD-10-PCS | Mod: S$GLB,,, | Performed by: STUDENT IN AN ORGANIZED HEALTH CARE EDUCATION/TRAINING PROGRAM

## 2020-08-17 RX ORDER — DICLOFENAC SODIUM 10 MG/G
2 GEL TOPICAL 4 TIMES DAILY PRN
Qty: 1 TUBE | Refills: 5 | Status: SHIPPED | OUTPATIENT
Start: 2020-08-17 | End: 2022-11-03

## 2020-08-17 RX ORDER — PANTOPRAZOLE SODIUM 40 MG/1
40 TABLET, DELAYED RELEASE ORAL DAILY
Qty: 30 TABLET | Refills: 6 | Status: SHIPPED | OUTPATIENT
Start: 2020-08-17 | End: 2021-01-19 | Stop reason: CLARIF

## 2020-08-17 NOTE — PROGRESS NOTES
I have reviewed the chart and the house staff's history and physical, assessment and plan. I have personally discussed the case with house staff and agree with the findings, assessment and plan.  I recommended discontinuation of Lasix without clear indication.  Also suggested GI consult.  Labs pending.

## 2020-08-17 NOTE — LETTER
August 17, 2020      [facility] - Internal Medicine  [address]  [facility] [address]  Phone: [phone]  Fax: [phone]       Patient: Nadine Nadine Claude   YOB: 1970  Date of Visit: 08/17/2020    To Whom It May Concern:    Nadine Claude  was at [facility] on 08/17/2020 for a doctors visit. She may return to work on 8/19/20.  If you have any questions or concerns, or if I can be of further assistance, please do not hesitate to contact me.    Sincerely,    Angel Bolaños MD

## 2020-08-18 VITALS
DIASTOLIC BLOOD PRESSURE: 80 MMHG | SYSTOLIC BLOOD PRESSURE: 110 MMHG | WEIGHT: 228.81 LBS | HEART RATE: 90 BPM | BODY MASS INDEX: 38.08 KG/M2 | OXYGEN SATURATION: 98 %

## 2020-08-18 LAB
ALBUMIN SERPL BCP-MCNC: 4 G/DL (ref 3.5–5.2)
ALP SERPL-CCNC: 77 U/L (ref 55–135)
ALT SERPL W/O P-5'-P-CCNC: 33 U/L (ref 10–44)
ANION GAP SERPL CALC-SCNC: 11 MMOL/L (ref 8–16)
AST SERPL-CCNC: 29 U/L (ref 10–40)
BASOPHILS # BLD AUTO: 0.03 K/UL (ref 0–0.2)
BASOPHILS NFR BLD: 0.4 % (ref 0–1.9)
BILIRUB SERPL-MCNC: 0.2 MG/DL (ref 0.1–1)
BUN SERPL-MCNC: 20 MG/DL (ref 6–20)
CALCIUM SERPL-MCNC: 9.6 MG/DL (ref 8.7–10.5)
CHLORIDE SERPL-SCNC: 103 MMOL/L (ref 95–110)
CO2 SERPL-SCNC: 24 MMOL/L (ref 23–29)
CREAT SERPL-MCNC: 1 MG/DL (ref 0.5–1.4)
CRP SERPL-MCNC: 10.7 MG/L (ref 0–8.2)
DIFFERENTIAL METHOD: ABNORMAL
EOSINOPHIL # BLD AUTO: 0.2 K/UL (ref 0–0.5)
EOSINOPHIL NFR BLD: 2.2 % (ref 0–8)
ERYTHROCYTE [DISTWIDTH] IN BLOOD BY AUTOMATED COUNT: 14.6 % (ref 11.5–14.5)
ERYTHROCYTE [SEDIMENTATION RATE] IN BLOOD BY WESTERGREN METHOD: 19 MM/HR (ref 0–36)
EST. GFR  (AFRICAN AMERICAN): >60 ML/MIN/1.73 M^2
EST. GFR  (NON AFRICAN AMERICAN): >60 ML/MIN/1.73 M^2
GLUCOSE SERPL-MCNC: 90 MG/DL (ref 70–110)
HCT VFR BLD AUTO: 40.5 % (ref 37–48.5)
HGB BLD-MCNC: 12.5 G/DL (ref 12–16)
IMM GRANULOCYTES # BLD AUTO: 0.02 K/UL (ref 0–0.04)
IMM GRANULOCYTES NFR BLD AUTO: 0.2 % (ref 0–0.5)
LYMPHOCYTES # BLD AUTO: 3.3 K/UL (ref 1–4.8)
LYMPHOCYTES NFR BLD: 39.4 % (ref 18–48)
MCH RBC QN AUTO: 28.8 PG (ref 27–31)
MCHC RBC AUTO-ENTMCNC: 30.9 G/DL (ref 32–36)
MCV RBC AUTO: 93 FL (ref 82–98)
MONOCYTES # BLD AUTO: 0.5 K/UL (ref 0.3–1)
MONOCYTES NFR BLD: 6.3 % (ref 4–15)
NEUTROPHILS # BLD AUTO: 4.4 K/UL (ref 1.8–7.7)
NEUTROPHILS NFR BLD: 51.5 % (ref 38–73)
NRBC BLD-RTO: 0 /100 WBC
PLATELET # BLD AUTO: 346 K/UL (ref 150–350)
PMV BLD AUTO: 9.1 FL (ref 9.2–12.9)
POTASSIUM SERPL-SCNC: 4.8 MMOL/L (ref 3.5–5.1)
PROT SERPL-MCNC: 7.8 G/DL (ref 6–8.4)
RBC # BLD AUTO: 4.34 M/UL (ref 4–5.4)
RHEUMATOID FACT SERPL-ACNC: <10 IU/ML (ref 0–15)
SODIUM SERPL-SCNC: 138 MMOL/L (ref 136–145)
WBC # BLD AUTO: 8.48 K/UL (ref 3.9–12.7)

## 2020-08-18 RX ORDER — OLMESARTAN MEDOXOMIL AND HYDROCHLOROTHIAZIDE 40/12.5 40; 12.5 MG/1; MG/1
TABLET ORAL
COMMUNITY
Start: 2020-07-30 | End: 2021-01-21

## 2020-08-18 NOTE — PROGRESS NOTES
Subjective     Chief Complaint: follow up     History of Present Illness:  Ms. Nadine M Claude is a 50 y.o. female PMH of hypertension, BRYCE, obesity, and depression who presents for follow up.     She reports a history of juvenile arthritis. Over the past year she states she has had increased joint pain and swelling in both upper and lower extremities. Effecting her hands and large joints. She has been taking ibuprofen and ASA for the pain. Also, states LE swelling that is worse at the end of the day. She attributes swelling to her joint pain. She called her old PCPs office who gave her lasix 20mg qd PRN which seems to improve her symptoms. She is already on olmesartan/HCTZ 40/12.5 for HTN. + fatigue but no fevers or other concerning autoimmune symptoms.    Also, reports small amount of bright red blood in her stool. She has been taking ASA and ibuprofen.     She is obese and concerned about her diet and lack of exercise during covid. I am giving her information on DASH diet today and encouraged home exercise.     Otherwise, she has no new symptoms or recent illness.       Review of Systems   Constitutional: Negative for fever and weight loss.   HENT: Negative for congestion and sore throat.    Eyes: Negative for blurred vision and pain.   Respiratory: Negative for cough and shortness of breath.    Cardiovascular: Negative for chest pain, orthopnea and leg swelling.   Gastrointestinal: Positive for blood in stool. Negative for abdominal pain, constipation, diarrhea, heartburn, melena, nausea and vomiting.   Genitourinary: Negative for dysuria and hematuria.   Musculoskeletal: Positive for joint pain (and swelling).   Skin: Negative for rash.   Neurological: Negative for weakness and headaches.   Psychiatric/Behavioral: Negative for depression, substance abuse and suicidal ideas.       PAST HISTORY:     Past Medical History:   Diagnosis Date    Back pain     Depression     GERD (gastroesophageal reflux disease)      Hypertension     Miscarriage        Past Surgical History:   Procedure Laterality Date    breast augmentation      DILATION AND CURETTAGE OF UTERUS      heel spurs  11/2018    TOTAL REDUCTION MAMMOPLASTY Bilateral 1994       MEDICATIONS & ALLERGIES:     Current Outpatient Medications on File Prior to Visit   Medication Sig    buPROPion (WELLBUTRIN) 100 MG tablet Take 1 tablet (100 mg total) by mouth 2 (two) times daily.    olmesartan-hydrochlorothiazide (BENICAR HCT) 40-12.5 mg Tab TK 1 T PO QD    zolpidem (AMBIEN) 10 mg Tab     [DISCONTINUED] diphenhydrAMINE (BENADRYL) 25 mg capsule Take 1 capsule (25 mg total) by mouth every 6 (six) hours as needed for Itching.    [DISCONTINUED] diphenhydrAMINE-zinc acetate 1-0.1% (BENADRYL ITCH STOPPING) cream Apply topically 3 (three) times daily as needed for Itching.    [DISCONTINUED] hydroCHLOROthiazide (HYDRODIURIL) 12.5 MG Tab Take 1 tablet (12.5 mg total) by mouth once daily.    [DISCONTINUED] scopolamine (TRANSDERM-SCOP) 1.3-1.5 mg (1 mg over 3 days) Place 1 patch onto the skin every 72 hours.     No current facility-administered medications on file prior to visit.        Review of patient's allergies indicates:   Allergen Reactions    Codeine Hallucinations       OBJECTIVE:     Vital Signs:  Vitals:    08/17/20 1528   BP: 110/80   Pulse: 90   SpO2: 98%   Weight: 103.8 kg (228 lb 13.4 oz)       Body mass index is 38.08 kg/m².     Physical Exam   Constitutional: She is oriented to person, place, and time and well-developed, well-nourished, and in no distress.   obese   HENT:   Head: Normocephalic and atraumatic.   Eyes: Conjunctivae are normal.   Neck: Normal range of motion.   Cardiovascular: Normal rate, regular rhythm and normal heart sounds.   Pulmonary/Chest: Effort normal and breath sounds normal. No respiratory distress. She has no wheezes.   Abdominal: Soft. She exhibits no distension. There is no abdominal tenderness. There is no rebound and no  guarding.   Musculoskeletal: Normal range of motion.         General: No edema.      Comments: No joint swelling, erythema, or tenderness noted    Neurological: She is alert and oriented to person, place, and time.   Psychiatric: Affect and judgment normal.       Laboratory  Lab Results   Component Value Date    WBC 8.48 08/17/2020    HGB 12.5 08/17/2020    HCT 40.5 08/17/2020    MCV 93 08/17/2020     08/17/2020     BMP  Lab Results   Component Value Date     08/17/2020    K 4.8 08/17/2020     08/17/2020    CO2 24 08/17/2020    BUN 20 08/17/2020    CREATININE 1.0 08/17/2020    CALCIUM 9.6 08/17/2020    ANIONGAP 11 08/17/2020    ESTGFRAFRICA >60.0 08/17/2020    EGFRNONAA >60.0 08/17/2020     No results found for: INR, PROTIME  No results found for: HGBA1C  No results for input(s): POCTGLUCOSE in the last 72 hours.      Health Maintenance Due   Topic Date Due    Hepatitis C Screening  1970    HIV Screening  07/18/1987         ASSESSMENT & PLAN:   Ms. Nadine M Claude is a 50 y.o. female here for follow up. New joint pain and LE swelling.     Arthritis  -     Sedimentation rate; Future; Expected date: 08/17/2020  -     C-reactive protein; Future; Expected date: 08/17/2020  -     Rheumatoid factor; Future; Expected date: 08/17/2020  -     diclofenac sodium (VOLTAREN) 1 % Gel; Apply 2 g topically 4 (four) times daily as needed.  Dispense: 1 Tube; Refill: 5  -     Comprehensive metabolic panel; Future; Expected date: 08/17/2020    Blood in stool  -     CBC auto differential; Future; Expected date: 08/17/2020  -     Ambulatory referral/consult to Gastroenterology; Future; Expected date: 08/24/2020  -     pantoprazole (PROTONIX) 40 MG tablet; Take 1 tablet (40 mg total) by mouth once daily.  Dispense: 30 tablet; Refill: 6    Obesity, unspecified classification, unspecified obesity type, unspecified whether serious comorbidity present  -     Ambulatory referral/consult to Medical [facility]itness ([facility]);  Future; Expected date: 08/24/2020  - gave info on DASH diet and encourage home exercise     LE swelling  -     Likely due to venous insufficiency. No concerning symptoms for heart failure.   - told to DC lasix as she is already on olmesartan/HCTZ    Depression   -     Mood stable. No longer taking Wellbutrin     BRYCE  -     continue home cpap        RTC in 3 months    Discussed with Dr. Castillo  - staff attestation to follow      Angel Bolaños MD  Internal Medicine PGY2  [facility]  [address] [place] [id number] [phone]

## 2020-08-28 ENCOUNTER — OFFICE VISIT (OUTPATIENT)
Dept: OBSTETRICS AND GYNECOLOGY | Facility: CLINIC | Age: 50
End: 2020-08-28
Payer: COMMERCIAL

## 2020-08-28 VITALS
WEIGHT: 228.63 LBS | DIASTOLIC BLOOD PRESSURE: 80 MMHG | SYSTOLIC BLOOD PRESSURE: 124 MMHG | BODY MASS INDEX: 38.04 KG/M2

## 2020-08-28 DIAGNOSIS — Z01.419 ENCOUNTER FOR ANNUAL ROUTINE GYNECOLOGICAL EXAMINATION: Primary | ICD-10-CM

## 2020-08-28 DIAGNOSIS — Z12.4 PAP SMEAR FOR CERVICAL CANCER SCREENING: ICD-10-CM

## 2020-08-28 DIAGNOSIS — Z12.31 SCREENING MAMMOGRAM, ENCOUNTER FOR: ICD-10-CM

## 2020-08-28 PROCEDURE — 99396 PR PREVENTIVE VISIT,EST,40-64: ICD-10-PCS | Mod: S$GLB,,, | Performed by: OBSTETRICS & GYNECOLOGY

## 2020-08-28 PROCEDURE — 88175 CYTOPATH C/V AUTO FLUID REDO: CPT

## 2020-08-28 PROCEDURE — 99999 PR PBB SHADOW E&M-EST. PATIENT-LVL III: CPT | Mod: PBBFAC,,, | Performed by: OBSTETRICS & GYNECOLOGY

## 2020-08-28 PROCEDURE — 99999 PR PBB SHADOW E&M-EST. PATIENT-LVL III: ICD-10-PCS | Mod: PBBFAC,,, | Performed by: OBSTETRICS & GYNECOLOGY

## 2020-08-28 PROCEDURE — 87624 HPV HI-RISK TYP POOLED RSLT: CPT

## 2020-08-28 PROCEDURE — 99396 PREV VISIT EST AGE 40-64: CPT | Mod: S$GLB,,, | Performed by: OBSTETRICS & GYNECOLOGY

## 2020-08-28 NOTE — PROGRESS NOTES
Chief Complaint   Patient presents with    Annual Exam       HISTORY OF PRESENT ILLNESS:   Nadine M Claude is a 50 y.o. female  who presents for well woman exam.  No LMP recorded..  She has no complaints. Cycles stopped for 3 months then had a light one then hasn't had one for 3 months.  Declines STD testing.      Past Medical History:   Diagnosis Date    Back pain     Depression     GERD (gastroesophageal reflux disease)     Hypertension     Miscarriage           Past Surgical History:   Procedure Laterality Date    breast augmentation      DILATION AND CURETTAGE OF UTERUS      heel spurs  11/2018    TOTAL REDUCTION MAMMOPLASTY Bilateral 1994         Social History     Socioeconomic History    Marital status:      Spouse name: Not on file    Number of children: Not on file    Years of education: Not on file    Highest education level: Not on file   Occupational History    Not on file   Social Needs    Financial resource strain: Not on file    Food insecurity     Worry: Not on file     Inability: Not on file    Transportation needs     Medical: Not on file     Non-medical: Not on file   Tobacco Use    Smoking status: Never Smoker    Smokeless tobacco: Never Used   Substance and Sexual Activity    Alcohol use: Yes     Comment: once a month    Drug use: Yes     Types: Marijuana     Comment: once a month    Sexual activity: Yes     Partners: Male     Comment: marrie   Lifestyle    Physical activity     Days per week: Not on file     Minutes per session: Not on file    Stress: Not on file   Relationships    Social connections     Talks on phone: Not on file     Gets together: Not on file     Attends Yazdanism service: Not on file     Active member of club or organization: Not on file     Attends meetings of clubs or organizations: Not on file     Relationship status: Not on file   Other Topics Concern    Not on file   Social History Narrative    Patient is  with 2 children. Does  not exercise and eats unhealthy diet. She plans to start exercising at her gym.        Family History   Problem Relation Age of Onset    Colon cancer Paternal Grandfather     Diabetes Mother     Heart disease Father     Aortic aneurysm Brother     Cerebral aneurysm Maternal Aunt     Diabetes Maternal Grandfather          OB History    Para Term  AB Living   4 2 2   2     SAB TAB Ectopic Multiple Live Births   2              # Outcome Date GA Lbr Arthur/2nd Weight Sex Delivery Anes PTL Lv   4 Term            3 SAB            2 SAB            1 Term            dads mom with uterine ca     COMPREHENSIVE GYN HISTORY:  PAP History: Denies abnormal Paps  Infection History: Denies STDs. Denies PID.  Benign History: Denies uterine fibroids. Denies ovarian cysts. Denies endometriosis Denies other conditions.  Cancer History: Denies cervical cancer. Denies uterine cancer or hyperplasia. Denies ovarian cancer. Denies vulvar cancer or pre-cancer. Denies vaginal cancer or pre-cancer. Denies breast cancer. Denies colon cancer.  Cycle: nl age.month/y      ROS:  GENERAL: Denies weight gain or weight loss. Feeling well overall.   SKIN: Denies rash or lesions.   HEAD: Denies headache.   NODES: Denies enlarged lymph nodes.   CHEST: Denies shortness of breath.   ABDOMEN: No abdominal pain, constipation, diarrhea, nausea, vomiting or rectal bleeding.   URINARY: No frequency, dysuria, hematuria, or burning on urination.  REPRODUCTIVE: See HPI.   BREASTS: The patient denies pain, lumps, or nipple discharge.       /80   Wt 103.7 kg (228 lb 9.9 oz)   BMI 38.04 kg/m²   APPEARANCE: Well nourished, well developed, in no acute distress.  NECK: Neck symmetric without  thyromegaly.  NODES: No inguinal, cervical lymph node enlargement.  CHEST: Lungs clear to auscultation.  HEART: Regular rate and rhythm, no murmurs, rubs or gallops.  ABDOMEN: Soft. No tenderness or masses. No hernias. No hepatosplenomegaly.  BREASTS:  Symmetrical, no skin changes or visible lesions. No palpable masses, nipple discharge or adenopathy bilaterally.  PELVIC:   VULVA: No lesions. Normal female genitalia.  URETHRAL MEATUS: Normal size and location, no lesions, no prolapse.  URETHRA: No masses, tenderness, prolapse or scarring.  VAGINA: Moist and well rugated, no discharge, no significant cystocele or rectocele.  CERVIX: No lesions and discharge.  UTERUS: Normal size, regular shape, mobile, non-tender, bladder base nontender.  ADNEXA: No masses or tenderness.    Data Reviewed:     Lipid profile: Date:   Lab Results   Component Value Date    CHOL 126 08/21/2017     Lab Results   Component Value Date    HDL 34 (L) 08/21/2017     Lab Results   Component Value Date    LDLCALC 63.4 08/21/2017     Lab Results   Component Value Date    TRIG 143 08/21/2017     Lab Results   Component Value Date    CHOLHDL 27.0 08/21/2017       1. Encounter for annual routine gynecological examination    2. Pap smear for cervical cancer screening    3. Screening mammogram, encounter for        Plan:   1. Routine gyn annual exam. s/p normal breast exam and MMG ordered.  Pap with HPV cotesting ordered. Likely perimenopausal, will call if gets heavy. STD testing: declined.       F/u in 1 yr or PRN

## 2020-09-09 LAB
HPV HR 12 DNA SPEC QL NAA+PROBE: NEGATIVE
HPV16 AG SPEC QL: NEGATIVE
HPV18 DNA SPEC QL NAA+PROBE: NEGATIVE

## 2020-09-16 ENCOUNTER — PATIENT MESSAGE (OUTPATIENT)
Dept: OBSTETRICS AND GYNECOLOGY | Facility: CLINIC | Age: 50
End: 2020-09-16

## 2020-09-16 LAB
FINAL PATHOLOGIC DIAGNOSIS: NORMAL
Lab: NORMAL

## 2020-11-05 ENCOUNTER — OFFICE VISIT (OUTPATIENT)
Dept: INTERNAL MEDICINE | Facility: CLINIC | Age: 50
End: 2020-11-05
Payer: COMMERCIAL

## 2020-11-05 ENCOUNTER — HOSPITAL ENCOUNTER (EMERGENCY)
Facility: HOSPITAL | Age: 50
Discharge: HOME | End: 2020-11-05
Attending: EMERGENCY MEDICINE
Payer: COMMERCIAL

## 2020-11-05 VITALS
HEART RATE: 79 BPM | DIASTOLIC BLOOD PRESSURE: 70 MMHG | RESPIRATION RATE: 16 BRPM | OXYGEN SATURATION: 97 % | WEIGHT: 226 LBS | HEIGHT: 65 IN | SYSTOLIC BLOOD PRESSURE: 119 MMHG | TEMPERATURE: 99 F | BODY MASS INDEX: 37.65 KG/M2

## 2020-11-05 VITALS
HEIGHT: 65 IN | BODY MASS INDEX: 37.69 KG/M2 | WEIGHT: 226.19 LBS | OXYGEN SATURATION: 99 % | DIASTOLIC BLOOD PRESSURE: 80 MMHG | HEART RATE: 81 BPM | SYSTOLIC BLOOD PRESSURE: 115 MMHG

## 2020-11-05 DIAGNOSIS — R07.9 CHEST PAIN: ICD-10-CM

## 2020-11-05 DIAGNOSIS — R07.9 CHEST PAIN, UNSPECIFIED TYPE: Primary | ICD-10-CM

## 2020-11-05 LAB
ALBUMIN SERPL BCP-MCNC: 3.6 G/DL (ref 3.5–5.2)
ALP SERPL-CCNC: 77 U/L (ref 55–135)
ALT SERPL W/O P-5'-P-CCNC: 26 U/L (ref 10–44)
ANION GAP SERPL CALC-SCNC: 10 MMOL/L (ref 8–16)
AST SERPL-CCNC: 26 U/L (ref 10–40)
B-HCG UR QL: NEGATIVE
BASOPHILS # BLD AUTO: 0.03 K/UL (ref 0–0.2)
BASOPHILS NFR BLD: 0.4 % (ref 0–1.9)
BILIRUB SERPL-MCNC: 0.3 MG/DL (ref 0.1–1)
BNP SERPL-MCNC: <10 PG/ML (ref 0–99)
BUN SERPL-MCNC: 10 MG/DL (ref 6–20)
CALCIUM SERPL-MCNC: 9.5 MG/DL (ref 8.7–10.5)
CHLORIDE SERPL-SCNC: 103 MMOL/L (ref 95–110)
CO2 SERPL-SCNC: 23 MMOL/L (ref 23–29)
CREAT SERPL-MCNC: 0.8 MG/DL (ref 0.5–1.4)
CTP QC/QA: YES
DIFFERENTIAL METHOD: ABNORMAL
EOSINOPHIL # BLD AUTO: 0.4 K/UL (ref 0–0.5)
EOSINOPHIL NFR BLD: 5 % (ref 0–8)
ERYTHROCYTE [DISTWIDTH] IN BLOOD BY AUTOMATED COUNT: 14.7 % (ref 11.5–14.5)
EST. GFR  (AFRICAN AMERICAN): >60 ML/MIN/1.73 M^2
EST. GFR  (NON AFRICAN AMERICAN): >60 ML/MIN/1.73 M^2
GLUCOSE SERPL-MCNC: 124 MG/DL (ref 70–110)
HCT VFR BLD AUTO: 39.2 % (ref 37–48.5)
HGB BLD-MCNC: 12.4 G/DL (ref 12–16)
IMM GRANULOCYTES # BLD AUTO: 0.03 K/UL (ref 0–0.04)
IMM GRANULOCYTES NFR BLD AUTO: 0.4 % (ref 0–0.5)
LYMPHOCYTES # BLD AUTO: 2.3 K/UL (ref 1–4.8)
LYMPHOCYTES NFR BLD: 29.6 % (ref 18–48)
MCH RBC QN AUTO: 28.6 PG (ref 27–31)
MCHC RBC AUTO-ENTMCNC: 31.6 G/DL (ref 32–36)
MCV RBC AUTO: 90 FL (ref 82–98)
MONOCYTES # BLD AUTO: 0.4 K/UL (ref 0.3–1)
MONOCYTES NFR BLD: 5.2 % (ref 4–15)
NEUTROPHILS # BLD AUTO: 4.6 K/UL (ref 1.8–7.7)
NEUTROPHILS NFR BLD: 59.4 % (ref 38–73)
NRBC BLD-RTO: 0 /100 WBC
PLATELET # BLD AUTO: 323 K/UL (ref 150–350)
PMV BLD AUTO: 8.5 FL (ref 9.2–12.9)
POTASSIUM SERPL-SCNC: 3.9 MMOL/L (ref 3.5–5.1)
PROT SERPL-MCNC: 7.4 G/DL (ref 6–8.4)
RBC # BLD AUTO: 4.34 M/UL (ref 4–5.4)
SODIUM SERPL-SCNC: 136 MMOL/L (ref 136–145)
TROPONIN I SERPL DL<=0.01 NG/ML-MCNC: <0.006 NG/ML (ref 0–0.03)
TROPONIN I SERPL DL<=0.01 NG/ML-MCNC: <0.006 NG/ML (ref 0–0.03)
WBC # BLD AUTO: 7.81 K/UL (ref 3.9–12.7)

## 2020-11-05 PROCEDURE — 99999 PR PBB SHADOW E&M-EST. PATIENT-LVL III: ICD-10-PCS | Mod: PBBFAC,,, | Performed by: STUDENT IN AN ORGANIZED HEALTH CARE EDUCATION/TRAINING PROGRAM

## 2020-11-05 PROCEDURE — 25000003 PHARM REV CODE 250: Performed by: PHYSICIAN ASSISTANT

## 2020-11-05 PROCEDURE — 99999 PR PBB SHADOW E&M-EST. PATIENT-LVL III: CPT | Mod: PBBFAC,,, | Performed by: STUDENT IN AN ORGANIZED HEALTH CARE EDUCATION/TRAINING PROGRAM

## 2020-11-05 PROCEDURE — 84484 ASSAY OF TROPONIN QUANT: CPT | Mod: 91

## 2020-11-05 PROCEDURE — 93010 ELECTROCARDIOGRAM REPORT: CPT | Mod: S$GLB,,, | Performed by: INTERNAL MEDICINE

## 2020-11-05 PROCEDURE — 85025 COMPLETE CBC W/AUTO DIFF WBC: CPT

## 2020-11-05 PROCEDURE — 99213 PR OFFICE/OUTPT VISIT, EST, LEVL III, 20-29 MIN: ICD-10-PCS | Mod: S$GLB,,, | Performed by: STUDENT IN AN ORGANIZED HEALTH CARE EDUCATION/TRAINING PROGRAM

## 2020-11-05 PROCEDURE — 93005 EKG 12-LEAD: ICD-10-PCS | Mod: S$GLB,,, | Performed by: INTERNAL MEDICINE

## 2020-11-05 PROCEDURE — 83880 ASSAY OF NATRIURETIC PEPTIDE: CPT

## 2020-11-05 PROCEDURE — 93005 ELECTROCARDIOGRAM TRACING: CPT

## 2020-11-05 PROCEDURE — 99284 EMERGENCY DEPT VISIT MOD MDM: CPT | Mod: ,,, | Performed by: PHYSICIAN ASSISTANT

## 2020-11-05 PROCEDURE — 99285 EMERGENCY DEPT VISIT HI MDM: CPT | Mod: 25

## 2020-11-05 PROCEDURE — 80053 COMPREHEN METABOLIC PANEL: CPT

## 2020-11-05 PROCEDURE — 93010 EKG 12-LEAD: ICD-10-PCS | Mod: ,,, | Performed by: INTERNAL MEDICINE

## 2020-11-05 PROCEDURE — 93010 EKG 12-LEAD: ICD-10-PCS | Mod: S$GLB,,, | Performed by: INTERNAL MEDICINE

## 2020-11-05 PROCEDURE — 93010 ELECTROCARDIOGRAM REPORT: CPT | Mod: ,,, | Performed by: INTERNAL MEDICINE

## 2020-11-05 PROCEDURE — 99284 PR EMERGENCY DEPT VISIT,LEVEL IV: ICD-10-PCS | Mod: ,,, | Performed by: PHYSICIAN ASSISTANT

## 2020-11-05 PROCEDURE — 93005 ELECTROCARDIOGRAM TRACING: CPT | Mod: S$GLB,,, | Performed by: INTERNAL MEDICINE

## 2020-11-05 PROCEDURE — 99213 OFFICE O/P EST LOW 20 MIN: CPT | Mod: S$GLB,,, | Performed by: STUDENT IN AN ORGANIZED HEALTH CARE EDUCATION/TRAINING PROGRAM

## 2020-11-05 PROCEDURE — 81025 URINE PREGNANCY TEST: CPT | Performed by: PHYSICIAN ASSISTANT

## 2020-11-05 RX ORDER — ASPIRIN 325 MG
325 TABLET ORAL
Status: COMPLETED | OUTPATIENT
Start: 2020-11-05 | End: 2020-11-05

## 2020-11-05 RX ADMIN — ASPIRIN 325 MG ORAL TABLET 325 MG: 325 PILL ORAL at 04:11

## 2020-11-05 NOTE — ED TRIAGE NOTES
Patient identifiers for Nadine M Claude 50 y.o. female checked and correct.  Chief Complaint   Patient presents with    Chest Pain     last night and this morning, had sob, sent from  clinic for blood work, had ekg     Past Medical History:   Diagnosis Date    Back pain     Depression     GERD (gastroesophageal reflux disease)     Hypertension     Miscarriage      Allergies reported:   Review of patient's allergies indicates:   Allergen Reactions    Codeine Hallucinations         LOC: The patient is awake, alert, and oriented to place, time, situation. Affect is appropriate.  Speech is appropriate and clear.     APPEARANCE: Patient resting comfortably in no acute distress.  Patient is clean and well groomed.    SKIN: The skin is warm and dry; color consistent with ethnicity.  Patient has normal skin turgor and moist mucus membranes.  Skin intact; no breakdown or bruising noted.     MUSCULOSKELETAL: Patient moving upper and lower extremities without difficulty.  Denies weakness.     RESPIRATORY: Airway is open and patent. Respirations spontaneous, even, easy, and non-labored. Pt reports SOB.  Patient has a normal effort and rate.  No accessory muscle use noted. Denies cough.     CARDIAC:  Normal rhythm and rate noted.  No peripheral edema noted. Chest strain from coughing.      ABDOMEN: Soft and non tender to palpation.  No distention noted.     NEUROLOGIC: Eyes open spontaneously.  Behavior appropriate to situation.  Follows commands; facial expression symmetrical.  Purposeful motor response noted; normal sensation in all extremities.

## 2020-11-05 NOTE — LETTER
"Araceli Ambrose" Claude was seen and treated in our clinic on 11/5/2020.  She may return to work on 11/6/2020    If you have any questions or concerns, please don't hesitate to call.      Danny Perez MD  Internal Medicine - PGYII  [facility]  [address] [place] [address]  [phone]    "

## 2020-11-05 NOTE — Clinical Note
"Araceli Ambrose" Claude was seen and treated in our emergency department on 11/5/2020.  She may return to work on 11/07/2020.       If you have any questions or concerns, please don't hesitate to call.      Macarena López PA-C"

## 2020-11-05 NOTE — PROGRESS NOTES
Clinic Note  11/5/2020      Subjective:       Patient ID:  Araceli is a 50 y.o. female being seen for an established visit.    Chief Complaint: Cough and Sore Throat    HPI  Nadine M Claude is a 50 years female with PMHx of HTN, depression who present to urgent appointment due to chest pain. Patient reported she was in her usual condition until yesterday around afternoon she started to have chest pain, radiated to left arm, sharp in nature last 15 minutes, she took 2 aspirin which seems helping with the pain, pain subside, but she has another episode in the morning, she walk up coughing, SOB, she vomiting twice, she took another 2 aspirin, she was able to go back to sleep 2 hours later. Denied headache, blurry vision, nausea, abdominal pain, hemoptysis, hematemesis or dysuria. She denied smoking but her  smokes at home sometimes. Her father had episodes of MI when he was 50 YO. She is concerned she may have heart attack.     Review of Systems   Constitutional: Negative for chills and fever.   HENT: Positive for sore throat. Negative for congestion.    Eyes: Negative for pain.   Respiratory: Positive for cough, sputum production, shortness of breath and wheezing. Negative for hemoptysis.    Cardiovascular: Positive for chest pain. Negative for palpitations and leg swelling.   Gastrointestinal: Positive for vomiting. Negative for abdominal pain, blood in stool, constipation, melena and nausea.   Genitourinary: Negative for dysuria and urgency.   Musculoskeletal: Negative for back pain and neck pain.   Neurological: Negative for dizziness and headaches.   Psychiatric/Behavioral: Negative for depression.       Past Medical History:   Diagnosis Date    Back pain     Depression     GERD (gastroesophageal reflux disease)     Hypertension     Miscarriage        Family History   Problem Relation Age of Onset    Colon cancer Paternal Grandfather     Diabetes Mother     Heart disease Father     Aortic aneurysm  "Brother     Cerebral aneurysm Maternal Aunt     Diabetes Maternal Grandfather         reports that she has never smoked. She has never used smokeless tobacco. She reports current alcohol use. She reports current drug use. Drug: Marijuana.    Medication List with Changes/Refills   Current Medications    BUPROPION (WELLBUTRIN) 100 MG TABLET    Take 1 tablet (100 mg total) by mouth 2 (two) times daily.    DICLOFENAC SODIUM (VOLTAREN) 1 % GEL    Apply 2 g topically 4 (four) times daily as needed.    OLMESARTAN-HYDROCHLOROTHIAZIDE (BENICAR HCT) 40-12.5 MG TAB    TK 1 T PO QD    PANTOPRAZOLE (PROTONIX) 40 MG TABLET    Take 1 tablet (40 mg total) by mouth once daily.    ZOLPIDEM (AMBIEN) 10 MG TAB         Review of patient's allergies indicates:   Allergen Reactions    Codeine Hallucinations       Patient Active Problem List   Diagnosis    Diverticulosis    Essential hypertension    Gastroesophageal reflux disease    Obesity (BMI 30-39.9)    Intermittent asthma    Insomnia    Snoring    BRYCE (obstructive sleep apnea)           Objective:      /80   Pulse 81   Ht 5' 5" (1.651 m)   Wt 102.6 kg (226 lb 3.1 oz)   SpO2 99%   BMI 37.64 kg/m²   Estimated body mass index is 37.64 kg/m² as calculated from the following:    Height as of this encounter: 5' 5" (1.651 m).    Weight as of this encounter: 102.6 kg (226 lb 3.1 oz).  Physical Exam   Constitutional: She is oriented to person, place, and time and well-developed, well-nourished, and in no distress. No distress.   Obese    HENT:   Head: Normocephalic and atraumatic.   Eyes: Pupils are equal, round, and reactive to light. EOM are normal.   Neck: Normal range of motion. Neck supple. No tracheal deviation present. No thyromegaly present.   Cardiovascular: Normal rate, regular rhythm and normal heart sounds.   No murmur heard.  Pulmonary/Chest: Effort normal and breath sounds normal. No respiratory distress. She has no wheezes. She has no rales.   Abdominal: " Soft. Bowel sounds are normal. She exhibits no distension. There is no abdominal tenderness. There is no rebound.   Musculoskeletal: Normal range of motion.         General: No tenderness, deformity or edema.   Neurological: She is alert and oriented to person, place, and time.   Skin: Skin is warm and dry. No rash noted. She is not diaphoretic. No erythema.   Psychiatric: Affect and judgment normal.         Assessment and Plan:         Araceli was seen today for cough and sore throat.    Diagnoses and all orders for this visit:    Chest pain, unspecified type  -     IN OFFICE EKG 12-LEAD (to [place])  -     Refer to Emergency Dept.  -     Exercise Stress - EKG; Future  -     CBC Auto Differential; Future  -     Comprehensive Metabolic Panel; Future  -     Troponin I; Future  -     Lipid Panel; Future    Ddx, includes MI, Angina, GERD, pneumonia.   EKG interpretation in clinic showed no signs of acute MI, she needs STAT labs including cardiac enzyme. Will send the patient to ED for further evaluation.   Reschedule follow-up in the clinic next week.   Patient verbalize understanding to the plan, she feels ok to go to the ED by herself. All questions and concerns answered.     Discussed with Dr. Bonner.     No follow-ups on file.    Other Orders Placed This Visit:  Orders Placed This Encounter   Procedures    CBC Auto Differential    Comprehensive Metabolic Panel    Troponin I    Lipid Panel    Refer to Emergency Dept.    Exercise Stress - EKG    IN OFFICE EKG 12-LEAD (to [facility])         Danny Perez MD  Internal Medicine - [facility]  [address] [place] [address]  [phone]

## 2020-11-05 NOTE — ED PROVIDER NOTES
Encounter Date: 11/5/2020       History     Chief Complaint   Patient presents with    Chest Pain     last night and this morning, had sob, sent from  clinic for blood work, had ekg     4:24 PM  Patient is a 50-year-old female with a history of HTN, GERD, back pain, depression presents the ED after being referred by PCP prior to arrival for chest pain and shortness of breath.  She states around 03:00 yesterday morning, she woke up with chest pain to her upper chest.  States that she has had it intermittently since.  Is currently complaining of 4/10 pain mainly to his upper middle and left chest with associated shortness of breath sometimes at rest.  She notices it with exertion.  Endorses a dry cough.  Has not vomited, but did spit up clear sputum with coughing spells.  Patient is concerned because her father passed away of a MI the age of 49.    Future Appointments  11/11/2020 2:00 PM    Angel Allenor, MD           [facility]        Fredis MOODYW          Review of patient's allergies indicates:   Allergen Reactions    Codeine Hallucinations     Past Medical History:   Diagnosis Date    Back pain     Depression     GERD (gastroesophageal reflux disease)     Hypertension     Miscarriage      Past Surgical History:   Procedure Laterality Date    breast augmentation      DILATION AND CURETTAGE OF UTERUS      heel spurs  11/2018    TOTAL REDUCTION MAMMOPLASTY Bilateral 1994     Family History   Problem Relation Age of Onset    Colon cancer Paternal Grandfather     Diabetes Mother     Heart disease Father     Aortic aneurysm Brother     Cerebral aneurysm Maternal Aunt     Diabetes Maternal Grandfather      Social History     Tobacco Use    Smoking status: Never Smoker    Smokeless tobacco: Never Used   Substance Use Topics    Alcohol use: Yes     Comment: once a month    Drug use: Yes     Types: Marijuana     Comment: once a month     Review of Systems   Constitutional: Negative for chills and fever.    HENT: Negative for sore throat.    Respiratory: Positive for cough and shortness of breath.    Cardiovascular: Positive for chest pain.   Gastrointestinal: Negative for nausea.   Genitourinary: Negative for dysuria.   Musculoskeletal: Negative for back pain.   Skin: Negative for rash.   Neurological: Negative for weakness.   Hematological: Does not bruise/bleed easily.       Physical Exam     Initial Vitals [11/05/20 1521]   BP Pulse Resp Temp SpO2   132/71 88 18 97.2 °F (36.2 °C) 98 %      MAP       --         Physical Exam    Nursing note and vitals reviewed.  Constitutional: She appears well-developed and well-nourished. She is not diaphoretic. She is cooperative.  Non-toxic appearance. She does not have a sickly appearance. She does not appear ill. No distress.   HENT:   Head: Normocephalic and atraumatic.   Nose: Nose normal.   Eyes: Conjunctivae and EOM are normal.   Neck: Normal range of motion.   Cardiovascular: Normal rate.   Pulmonary/Chest: Breath sounds normal. No respiratory distress. She has no wheezes. She has no rales. She exhibits no bony tenderness.   Abdominal: Soft. She exhibits no distension. There is no abdominal tenderness. There is no rebound.   Musculoskeletal: Normal range of motion.   Neurological: She is alert. She has normal strength.   Skin: Skin is warm and dry. No erythema. No pallor.   Psychiatric: She has a normal mood and affect. Thought content normal.         ED Course   Procedures  Labs Reviewed   CBC W/ AUTO DIFFERENTIAL - Abnormal; Notable for the following components:       Result Value    MCHC 31.6 (*)     RDW 14.7 (*)     MPV 8.5 (*)     All other components within normal limits   COMPREHENSIVE METABOLIC PANEL - Abnormal; Notable for the following components:    Glucose 124 (*)     All other components within normal limits   TROPONIN I   TROPONIN I   B-TYPE NATRIURETIC PEPTIDE   POCT URINE PREGNANCY        ECG Results          EKG 12-lead (Final result)  Result time  11/05/20 16:43:39    Final result by Interface, Lab In [facility] (11/05/20 16:43:39)                 Narrative:    Test Reason : R07.9,    Vent. Rate : 084 BPM     Atrial Rate : 084 BPM     P-R Int : 144 ms          QRS Dur : 092 ms      QT Int : 366 ms       P-R-T Axes : 059 022 027 degrees     QTc Int : 432 ms    Normal sinus rhythm  Cannot rule out Anterior infarct (cited on or before 05-NOV-2020)  Abnormal ECG  When compared with ECG of 05-NOV-2020 14:52,  Questionable change in initial forces of Anterior leads  Nonspecific T wave abnormality no longer evident in Anterior leads  Confirmed by Serafin Yoder MD ([id number]) on 11/5/2020 4:43:24 PM    Referred By:             Confirmed By:Serafin Yoder MD                            Imaging Results          X-Ray Chest PA And Lateral (Final result)  Result time 11/05/20 17:54:03    Final result by Manuel Guevara MD (11/05/20 17:54:03)                 Impression:      No acute abnormality.      Electronically signed by: Manuel Guevara  Date:    11/05/2020  Time:    17:54             Narrative:    EXAMINATION:  XR CHEST PA AND LATERAL    CLINICAL HISTORY:  Chest Pain;    TECHNIQUE:  PA and lateral views of the chest were performed.    COMPARISON:  11/15/2015    FINDINGS:  The lungs are clear, with normal appearance of pulmonary vasculature and no pleural effusion or pneumothorax.    The cardiac silhouette is normal in size. The hilar and mediastinal contours are unremarkable.    Bones are intact.                                 Medical Decision Making:   History:   Old Medical Records: I decided to obtain old medical records.  Initial Assessment:   Patient is a 50-year-old female with a history of HTN, GERD, back pain, depression presents the ED after being referred by PCP prior to arrival for chest pain and shortness of breath onset yesterday morning.  Differential Diagnosis:   Includes but is not limited to strain, sprain, anxiety, stress, ACS, heart failure.  No  tachycardia or hypoxia.  I have a low suspicion for pulmonary embolism given low risk factors.  Independently Interpreted Test(s):   I have ordered and independently interpreted EKG Reading(s) - see summary below  Clinical Tests:   Lab Tests: Reviewed and Ordered  Radiological Study: Reviewed and Ordered  Medical Tests: Reviewed  ED Management:  Will initiate workup and continue monitor.    EKG with NSR at 84 beats per minute.  No ischemic changes.  No STEMI.  CBC with no leukocytosis or anemia.    CMP without electrolyte abnormalities.  No kidney injury.  No hyperbilirubinemia or liver enzyme elevations.    1st troponin negative.  Will trend.    BNP within normal limits.    Chest x-ray without acute findings.    2nd troponin negative.    Given history, physical exam, and workup today, likely chest pain is noncardiac in nature.  She had 2 flat troponins.  She was updated with results.  She has been chest pain free while here.  No new symptoms.  Vitals stable.  Her heart score is 3.  She is stable for outpatient follow-up for provocative testing.  Discussed lifestyle modifications.  Weight loss, physical activity, and healthy diet.  Continue HTN medication.  Follow up closely.  She was provided with Cardiology number to follow-up.  Return to ED precautions were given for signs and symptoms as discussed, and she voices her understanding.    Additional MDM:   Heart Score:    History:          Slightly suspicious.  ECG:             Normal  Age:               45-65 years  Risk factors: >= 3 risk factors or history of atherosclerotic disease  Troponin:       Less than or equal to normal limit  Final Score: 3                              Clinical Impression:       ICD-10-CM ICD-9-CM   1. Chest pain  R07.9 786.50                      Disposition:   Disposition: Discharged  Condition: Stable     ED Disposition Condition    Discharge Stable        ED Prescriptions     None        Follow-up Information     Follow up With  Specialties Details Why Contact Info Additional Information    Angel Bolaños MD Internal Medicine Schedule an appointment as soon as possible for a visit   [facility] [id number] [phone]       [facility] 3rd Floor Cardiology Schedule an appointment as soon as possible for a visit   [address] [place] [address] [phone] Cardiology Services Clinics - 3rd floor    [facility] Emergency Medicine  If symptoms worsen [address] [place] [address] [phone]                                        Macarena López PA-C  11/06/20 1101

## 2020-11-06 NOTE — DISCHARGE INSTRUCTIONS
Call and follow-up with cardiology.  Follow-up with primary care physician.  Avoid spicy food, tomato sauce, caffeine, carbonated drinks, peppermint, tobacco to reduce acid reflux.  Start any over-the-counter acid reflux medications such as Prilosec, Nexium, or Pepcid.  Take for 4 weeks.  Return promptly to the emergency department for new or worsening symptoms.

## 2020-11-21 ENCOUNTER — OFFICE VISIT (OUTPATIENT)
Dept: URGENT CARE | Facility: CLINIC | Age: 50
End: 2020-11-21
Payer: COMMERCIAL

## 2020-11-21 VITALS
OXYGEN SATURATION: 98 % | SYSTOLIC BLOOD PRESSURE: 122 MMHG | HEIGHT: 65 IN | DIASTOLIC BLOOD PRESSURE: 75 MMHG | TEMPERATURE: 97 F | WEIGHT: 226 LBS | RESPIRATION RATE: 18 BRPM | HEART RATE: 71 BPM | BODY MASS INDEX: 37.65 KG/M2

## 2020-11-21 DIAGNOSIS — H10.31 ACUTE CONJUNCTIVITIS OF RIGHT EYE, UNSPECIFIED ACUTE CONJUNCTIVITIS TYPE: Primary | ICD-10-CM

## 2020-11-21 PROCEDURE — 99214 OFFICE O/P EST MOD 30 MIN: CPT | Mod: S$GLB,,, | Performed by: NURSE PRACTITIONER

## 2020-11-21 PROCEDURE — 99214 PR OFFICE/OUTPT VISIT, EST, LEVL IV, 30-39 MIN: ICD-10-PCS | Mod: S$GLB,,, | Performed by: NURSE PRACTITIONER

## 2020-11-21 RX ORDER — OFLOXACIN 3 MG/ML
1 SOLUTION/ DROPS OPHTHALMIC 4 TIMES DAILY
Qty: 10 ML | Refills: 0 | Status: SHIPPED | OUTPATIENT
Start: 2020-11-21 | End: 2020-11-28

## 2020-11-21 RX ORDER — ETODOLAC 400 MG/1
TABLET, FILM COATED ORAL
COMMUNITY
Start: 2020-11-11 | End: 2021-01-19 | Stop reason: CLARIF

## 2020-11-21 NOTE — PROGRESS NOTES
"Subjective:       Patient ID: Nadine M Claude is a 50 y.o. female.    Vitals:  height is 5' 5" (1.651 m) and weight is 102.5 kg (226 lb). Her temperature is 97.2 °F (36.2 °C). Her blood pressure is 122/75 and her pulse is 71. Her respiration is 18 and oxygen saturation is 98%.     Chief Complaint: Eye Problem (right eye)    Patient reports right eye pain with discharge that started 2 days ago, she had matting of eyelid this morning but states that she thinks it's actually better but needs to be able to return to work.  She used otc eye drops with some relief.    Eye Problem   The right eye is affected. This is a new problem. The current episode started in the past 7 days. The problem occurs constantly. The problem has been waxing and waning. There was no injury mechanism. The pain is at a severity of 2/10. There is no known exposure to pink eye. She wears contacts. Associated symptoms include an eye discharge and eye redness. Pertinent negatives include no blurred vision, double vision, fever, foreign body sensation, itching, nausea, photophobia, recent URI or vomiting. She has tried eye drops for the symptoms. The treatment provided no relief.       Constitution: Negative for chills and fever.   HENT: Negative for congestion and sinus pain.    Eyes: Positive for eye discharge and eye redness. Negative for eye trauma, foreign body in eye, eye itching, eye pain, photophobia, vision loss, double vision, blurred vision and eyelid swelling.   Gastrointestinal: Negative for nausea and vomiting.   Genitourinary: Negative for history of kidney stones.   Skin: Negative for rash.   Allergic/Immunologic: Negative for seasonal allergies and itching.   Neurological: Negative for headaches.       Objective:      Physical Exam   Constitutional: She is oriented to person, place, and time. She appears well-developed.  Non-toxic appearance. She does not appear ill. No distress.   HENT:   Head: Normocephalic and atraumatic.   Ears: "   Right Ear: External ear normal.   Left Ear: External ear normal.   Nose: Nose normal.   Mouth/Throat: Oropharynx is clear and moist.   Eyes: Pupils are equal, round, and reactive to light. Conjunctivae, EOM and lids are normal. Lids are everted and swept, no foreign bodies found. Right eye exhibits no chemosis, no discharge, no exudate and no hordeolum. No foreign body present in the right eye. Left eye exhibits no chemosis, no discharge, no exudate and no hordeolum. No foreign body present in the left eye. No visual field deficit is present. Right conjunctiva is not injected. Right conjunctiva has no hemorrhage. Left conjunctiva is not injected. Left conjunctiva has no hemorrhage.      Comments: Vision noted in clinic, she does not have her contacts in.  Denies changes to vision. vision grossly intact  Neck: Trachea normal, full passive range of motion without pain and phonation normal. Neck supple.   Musculoskeletal: Normal range of motion.   Neurological: She is alert and oriented to person, place, and time.   Skin: Skin is warm, dry, intact and not diaphoretic. Psychiatric: Her speech is normal and behavior is normal. Judgment and thought content normal.   Nursing note and vitals reviewed.        Hearing/Vision    Vision Screening  Edited by: Ena Recinos MA   Right eye Left eye Both eyes   With correction 20/50 20/50 20/50          Assessment:       1. Acute conjunctivitis of right eye, unspecified acute conjunctivitis type        Plan:         Acute conjunctivitis of right eye, unspecified acute conjunctivitis type  -     ofloxacin (OCUFLOX) 0.3 % ophthalmic solution; Place 1 drop into the right eye 4 (four) times daily. for 7 days  Dispense: 10 mL; Refill: 0      Patient Instructions                                               EYE   If your condition worsens or fails to improve we recommend that you receive another evaluation at the ER immediately or contact your PCP to discuss your concerns or return  here. You must understand that you've received an urgent care treatment only and that you may be released before all your medical problems are known or treated. You the patient will arrange for followup care as instructed.   Once the eye redness decreases after a few days then decrease the frequency to three times a day. Use the eye drops for 24 hours after the last day of eye symptoms.   Do not wear your contact lens ( if you use them) for at least 5 days after you stop having symptoms and are rechecked by your doctor. Throw away the contacts, contact solution and carrying case you were using and start with new material.   If you develop increase eye symptoms or change in your vision seek medical care immediately either with your ophthalomologist or the ER or return here.       Conjunctivitis, Nonspecific    The membrane that covers the white part of your eye (the conjunctiva) is inflamed. Inflammation happens when your body responds to an injury, allergic reaction, infection, or illness. Symptoms of inflammation in the eye may include redness, irritation, itching, swelling, or burning. These symptoms should go away within the next 24 hours. Conjunctivitis may be related to a particle that was in your eye. If so, it may wash out with your tears or irrigation treatment. Being exposed to liquid chemicals or fumes may also cause this reaction.   Home care  · Apply a cold pack (ice in a plastic bag, wrapped in a towel) over the eye for 20 minutes at a time. This will reduce pain.  · Artificial tears may be prescribed to reduce irritation or redness.  These should be used 3 to 4 times a day.  · You may use acetaminophen or ibuprofen to control pain, unless another medicine was prescribed.(Note: If you have chronic liver or kidney disease, or if you have ever had a stomach ulcer or gastrointestinal bleeding, talk with your healthcare provider before using these medicines.)  Follow-up care  Follow up with your healthcare  provider, or as advised.  When to seek medical advice  Call your healthcare provider right away if any of these occur:  · Increased eyelid swelling  · Increased eye pain  · Increased redness or drainage from the eye  · Increased blurry vision or increased sensitivity to light  · Failure of normal vision to return within 24 to 48 hours  Date Last Reviewed: 6/14/2015  © 9712-9496 [employer]. [address] [place] [address], [place] [address]. All rights reserved. This information is not intended as a substitute for professional medical care. Always follow your healthcare professional's instructions.

## 2020-11-21 NOTE — LETTER
November 21, 2020      [facility] [address] [facility] [address]  Phone: [phone]  Fax: [phone]       Patient: Nadine Nadine Claude   YOB: 1970  Date of Visit: 11/21/2020    To Whom It May Concern:    Nadine Claude  was at [facility] on 11/21/2020. She may return to work/school on 11/23/20 with no restrictions to regularly scheduled shift. If you have any questions or concerns, or if I can be of further assistance, please do not hesitate to contact me.    Sincerely,        Julisa Andrade, NP

## 2020-11-22 NOTE — PATIENT INSTRUCTIONS
EYE   If your condition worsens or fails to improve we recommend that you receive another evaluation at the ER immediately or contact your PCP to discuss your concerns or return here. You must understand that you've received an urgent care treatment only and that you may be released before all your medical problems are known or treated. You the patient will arrange for followup care as instructed.   Once the eye redness decreases after a few days then decrease the frequency to three times a day. Use the eye drops for 24 hours after the last day of eye symptoms.   Do not wear your contact lens ( if you use them) for at least 5 days after you stop having symptoms and are rechecked by your doctor. Throw away the contacts, contact solution and carrying case you were using and start with new material.   If you develop increase eye symptoms or change in your vision seek medical care immediately either with your ophthalomologist or the ER or return here.       Conjunctivitis, Nonspecific    The membrane that covers the white part of your eye (the conjunctiva) is inflamed. Inflammation happens when your body responds to an injury, allergic reaction, infection, or illness. Symptoms of inflammation in the eye may include redness, irritation, itching, swelling, or burning. These symptoms should go away within the next 24 hours. Conjunctivitis may be related to a particle that was in your eye. If so, it may wash out with your tears or irrigation treatment. Being exposed to liquid chemicals or fumes may also cause this reaction.   Home care  · Apply a cold pack (ice in a plastic bag, wrapped in a towel) over the eye for 20 minutes at a time. This will reduce pain.  · Artificial tears may be prescribed to reduce irritation or redness.  These should be used 3 to 4 times a day.  · You may use acetaminophen or ibuprofen to control pain, unless another medicine was prescribed.(Note: If you  have chronic liver or kidney disease, or if you have ever had a stomach ulcer or gastrointestinal bleeding, talk with your healthcare provider before using these medicines.)  Follow-up care  Follow up with your healthcare provider, or as advised.  When to seek medical advice  Call your healthcare provider right away if any of these occur:  · Increased eyelid swelling  · Increased eye pain  · Increased redness or drainage from the eye  · Increased blurry vision or increased sensitivity to light  · Failure of normal vision to return within 24 to 48 hours  Date Last Reviewed: 6/14/2015 © 2000-2017 [employer]. [address] [place] [address]. All rights reserved. This information is not intended as a substitute for professional medical care. Always follow your healthcare professional's instructions.

## 2021-01-05 ENCOUNTER — OFFICE VISIT (OUTPATIENT)
Dept: BARIATRICS | Facility: CLINIC | Age: 51
End: 2021-01-05
Payer: COMMERCIAL

## 2021-01-05 VITALS
BODY MASS INDEX: 38.53 KG/M2 | OXYGEN SATURATION: 99 % | DIASTOLIC BLOOD PRESSURE: 72 MMHG | HEART RATE: 100 BPM | WEIGHT: 225.69 LBS | HEIGHT: 64 IN | SYSTOLIC BLOOD PRESSURE: 102 MMHG

## 2021-01-05 DIAGNOSIS — R73.9 ELEVATED SERUM GLUCOSE: ICD-10-CM

## 2021-01-05 DIAGNOSIS — K21.9 GASTROESOPHAGEAL REFLUX DISEASE WITHOUT ESOPHAGITIS: ICD-10-CM

## 2021-01-05 DIAGNOSIS — G47.33 OSA (OBSTRUCTIVE SLEEP APNEA): ICD-10-CM

## 2021-01-05 DIAGNOSIS — E66.01 CLASS 2 SEVERE OBESITY DUE TO EXCESS CALORIES WITH SERIOUS COMORBIDITY AND BODY MASS INDEX (BMI) OF 38.0 TO 38.9 IN ADULT: Primary | ICD-10-CM

## 2021-01-05 PROCEDURE — 99214 PR OFFICE/OUTPT VISIT, EST, LEVL IV, 30-39 MIN: ICD-10-PCS | Mod: S$GLB,,, | Performed by: STUDENT IN AN ORGANIZED HEALTH CARE EDUCATION/TRAINING PROGRAM

## 2021-01-05 PROCEDURE — 99999 PR PBB SHADOW E&M-EST. PATIENT-LVL III: CPT | Mod: PBBFAC,,, | Performed by: STUDENT IN AN ORGANIZED HEALTH CARE EDUCATION/TRAINING PROGRAM

## 2021-01-05 PROCEDURE — 99999 PR PBB SHADOW E&M-EST. PATIENT-LVL III: ICD-10-PCS | Mod: PBBFAC,,, | Performed by: STUDENT IN AN ORGANIZED HEALTH CARE EDUCATION/TRAINING PROGRAM

## 2021-01-05 PROCEDURE — 99214 OFFICE O/P EST MOD 30 MIN: CPT | Mod: S$GLB,,, | Performed by: STUDENT IN AN ORGANIZED HEALTH CARE EDUCATION/TRAINING PROGRAM

## 2021-01-05 RX ORDER — SEMAGLUTIDE 1.34 MG/ML
INJECTION, SOLUTION SUBCUTANEOUS
Qty: 1.5 ML | Refills: 0 | Status: SHIPPED | OUTPATIENT
Start: 2021-01-05 | End: 2021-01-19 | Stop reason: CLARIF

## 2021-01-19 ENCOUNTER — HOSPITAL ENCOUNTER (EMERGENCY)
Facility: HOSPITAL | Age: 51
Discharge: HOME | End: 2021-01-19
Attending: EMERGENCY MEDICINE
Payer: COMMERCIAL

## 2021-01-19 VITALS
SYSTOLIC BLOOD PRESSURE: 153 MMHG | TEMPERATURE: 96 F | WEIGHT: 229 LBS | DIASTOLIC BLOOD PRESSURE: 92 MMHG | OXYGEN SATURATION: 97 % | BODY MASS INDEX: 39.09 KG/M2 | RESPIRATION RATE: 16 BRPM | HEIGHT: 64 IN | HEART RATE: 72 BPM

## 2021-01-19 DIAGNOSIS — R51.9 ACUTE NONINTRACTABLE HEADACHE, UNSPECIFIED HEADACHE TYPE: Primary | ICD-10-CM

## 2021-01-19 LAB
ALBUMIN SERPL BCP-MCNC: 3.7 G/DL (ref 3.5–5.2)
ALP SERPL-CCNC: 77 U/L (ref 55–135)
ALT SERPL W/O P-5'-P-CCNC: 39 U/L (ref 10–44)
ANION GAP SERPL CALC-SCNC: 9 MMOL/L (ref 8–16)
AST SERPL-CCNC: 23 U/L (ref 10–40)
B-HCG UR QL: NEGATIVE
BASOPHILS # BLD AUTO: 0.02 K/UL (ref 0–0.2)
BASOPHILS NFR BLD: 0.3 % (ref 0–1.9)
BILIRUB SERPL-MCNC: 0.2 MG/DL (ref 0.1–1)
BUN SERPL-MCNC: 13 MG/DL (ref 6–20)
CALCIUM SERPL-MCNC: 9.5 MG/DL (ref 8.7–10.5)
CHLORIDE SERPL-SCNC: 104 MMOL/L (ref 95–110)
CO2 SERPL-SCNC: 26 MMOL/L (ref 23–29)
CREAT SERPL-MCNC: 0.8 MG/DL (ref 0.5–1.4)
CRP SERPL-MCNC: 12.6 MG/L (ref 0–8.2)
CTP QC/QA: YES
CTP QC/QA: YES
DIFFERENTIAL METHOD: ABNORMAL
EOSINOPHIL # BLD AUTO: 0.2 K/UL (ref 0–0.5)
EOSINOPHIL NFR BLD: 2 % (ref 0–8)
ERYTHROCYTE [DISTWIDTH] IN BLOOD BY AUTOMATED COUNT: 14.4 % (ref 11.5–14.5)
ERYTHROCYTE [SEDIMENTATION RATE] IN BLOOD BY WESTERGREN METHOD: 23 MM/HR (ref 0–36)
EST. GFR  (AFRICAN AMERICAN): >60 ML/MIN/1.73 M^2
EST. GFR  (NON AFRICAN AMERICAN): >60 ML/MIN/1.73 M^2
GLUCOSE SERPL-MCNC: 98 MG/DL (ref 70–110)
HCT VFR BLD AUTO: 39.1 % (ref 37–48.5)
HGB BLD-MCNC: 12.1 G/DL (ref 12–16)
IMM GRANULOCYTES # BLD AUTO: 0.02 K/UL (ref 0–0.04)
IMM GRANULOCYTES NFR BLD AUTO: 0.3 % (ref 0–0.5)
INR PPP: 0.9 (ref 0.8–1.2)
LYMPHOCYTES # BLD AUTO: 2.4 K/UL (ref 1–4.8)
LYMPHOCYTES NFR BLD: 30.1 % (ref 18–48)
MCH RBC QN AUTO: 27.9 PG (ref 27–31)
MCHC RBC AUTO-ENTMCNC: 30.9 G/DL (ref 32–36)
MCV RBC AUTO: 90 FL (ref 82–98)
MONOCYTES # BLD AUTO: 0.4 K/UL (ref 0.3–1)
MONOCYTES NFR BLD: 5.6 % (ref 4–15)
NEUTROPHILS # BLD AUTO: 4.8 K/UL (ref 1.8–7.7)
NEUTROPHILS NFR BLD: 61.7 % (ref 38–73)
NRBC BLD-RTO: 0 /100 WBC
PLATELET # BLD AUTO: 327 K/UL (ref 150–350)
PMV BLD AUTO: 8.5 FL (ref 9.2–12.9)
POTASSIUM SERPL-SCNC: 4.3 MMOL/L (ref 3.5–5.1)
PROT SERPL-MCNC: 7.3 G/DL (ref 6–8.4)
PROTHROMBIN TIME: 9.9 SEC (ref 9–12.5)
RBC # BLD AUTO: 4.33 M/UL (ref 4–5.4)
SARS-COV-2 RDRP RESP QL NAA+PROBE: NEGATIVE
SODIUM SERPL-SCNC: 139 MMOL/L (ref 136–145)
WBC # BLD AUTO: 7.82 K/UL (ref 3.9–12.7)

## 2021-01-19 PROCEDURE — 99284 EMERGENCY DEPT VISIT MOD MDM: CPT | Mod: CS,,, | Performed by: EMERGENCY MEDICINE

## 2021-01-19 PROCEDURE — 96375 TX/PRO/DX INJ NEW DRUG ADDON: CPT

## 2021-01-19 PROCEDURE — 25500020 PHARM REV CODE 255: Performed by: EMERGENCY MEDICINE

## 2021-01-19 PROCEDURE — 80053 COMPREHEN METABOLIC PANEL: CPT

## 2021-01-19 PROCEDURE — 86140 C-REACTIVE PROTEIN: CPT

## 2021-01-19 PROCEDURE — 99284 PR EMERGENCY DEPT VISIT,LEVEL IV: ICD-10-PCS | Mod: CS,,, | Performed by: EMERGENCY MEDICINE

## 2021-01-19 PROCEDURE — 85025 COMPLETE CBC W/AUTO DIFF WBC: CPT

## 2021-01-19 PROCEDURE — 25000003 PHARM REV CODE 250: Performed by: EMERGENCY MEDICINE

## 2021-01-19 PROCEDURE — 85610 PROTHROMBIN TIME: CPT

## 2021-01-19 PROCEDURE — U0002 COVID-19 LAB TEST NON-CDC: HCPCS | Performed by: EMERGENCY MEDICINE

## 2021-01-19 PROCEDURE — 85652 RBC SED RATE AUTOMATED: CPT

## 2021-01-19 PROCEDURE — 99284 EMERGENCY DEPT VISIT MOD MDM: CPT | Mod: 25

## 2021-01-19 PROCEDURE — 81025 URINE PREGNANCY TEST: CPT | Performed by: EMERGENCY MEDICINE

## 2021-01-19 PROCEDURE — 96361 HYDRATE IV INFUSION ADD-ON: CPT

## 2021-01-19 PROCEDURE — 96374 THER/PROPH/DIAG INJ IV PUSH: CPT

## 2021-01-19 PROCEDURE — 63600175 PHARM REV CODE 636 W HCPCS: Performed by: EMERGENCY MEDICINE

## 2021-01-19 RX ORDER — METHYLPREDNISOLONE SOD SUCC 125 MG
125 VIAL (EA) INJECTION
Status: COMPLETED | OUTPATIENT
Start: 2021-01-19 | End: 2021-01-19

## 2021-01-19 RX ORDER — DIPHENHYDRAMINE HYDROCHLORIDE 50 MG/ML
12.5 INJECTION INTRAMUSCULAR; INTRAVENOUS
Status: COMPLETED | OUTPATIENT
Start: 2021-01-19 | End: 2021-01-19

## 2021-01-19 RX ORDER — BUTALBITAL, ACETAMINOPHEN AND CAFFEINE 50; 325; 40 MG/1; MG/1; MG/1
1 TABLET ORAL EVERY 6 HOURS PRN
Qty: 20 TABLET | Refills: 0 | Status: SHIPPED | OUTPATIENT
Start: 2021-01-19 | End: 2022-11-03 | Stop reason: SDUPTHER

## 2021-01-19 RX ORDER — PROCHLORPERAZINE EDISYLATE 5 MG/ML
10 INJECTION INTRAMUSCULAR; INTRAVENOUS
Status: COMPLETED | OUTPATIENT
Start: 2021-01-19 | End: 2021-01-19

## 2021-01-19 RX ADMIN — PROCHLORPERAZINE EDISYLATE 10 MG: 5 INJECTION INTRAMUSCULAR; INTRAVENOUS at 01:01

## 2021-01-19 RX ADMIN — METHYLPREDNISOLONE SODIUM SUCCINATE 125 MG: 125 INJECTION, POWDER, FOR SOLUTION INTRAMUSCULAR; INTRAVENOUS at 01:01

## 2021-01-19 RX ADMIN — SODIUM CHLORIDE 1000 ML: 0.9 INJECTION, SOLUTION INTRAVENOUS at 01:01

## 2021-01-19 RX ADMIN — IOHEXOL 75 ML: 350 INJECTION, SOLUTION INTRAVENOUS at 01:01

## 2021-01-19 RX ADMIN — DIPHENHYDRAMINE HYDROCHLORIDE 12.5 MG: 50 INJECTION, SOLUTION INTRAMUSCULAR; INTRAVENOUS at 01:01

## 2021-01-21 ENCOUNTER — OFFICE VISIT (OUTPATIENT)
Dept: INTERNAL MEDICINE | Facility: CLINIC | Age: 51
End: 2021-01-21
Payer: COMMERCIAL

## 2021-01-21 VITALS
BODY MASS INDEX: 39.18 KG/M2 | OXYGEN SATURATION: 97 % | WEIGHT: 229.5 LBS | DIASTOLIC BLOOD PRESSURE: 82 MMHG | HEIGHT: 64 IN | SYSTOLIC BLOOD PRESSURE: 130 MMHG | HEART RATE: 97 BPM

## 2021-01-21 DIAGNOSIS — I10 HYPERTENSION, UNSPECIFIED TYPE: Primary | ICD-10-CM

## 2021-01-21 DIAGNOSIS — F32.A DEPRESSION, UNSPECIFIED DEPRESSION TYPE: ICD-10-CM

## 2021-01-21 PROCEDURE — 99213 OFFICE O/P EST LOW 20 MIN: CPT | Mod: S$GLB,,, | Performed by: STUDENT IN AN ORGANIZED HEALTH CARE EDUCATION/TRAINING PROGRAM

## 2021-01-21 PROCEDURE — 99999 PR PBB SHADOW E&M-EST. PATIENT-LVL III: ICD-10-PCS | Mod: PBBFAC,,, | Performed by: STUDENT IN AN ORGANIZED HEALTH CARE EDUCATION/TRAINING PROGRAM

## 2021-01-21 PROCEDURE — 99999 PR PBB SHADOW E&M-EST. PATIENT-LVL III: CPT | Mod: PBBFAC,,, | Performed by: STUDENT IN AN ORGANIZED HEALTH CARE EDUCATION/TRAINING PROGRAM

## 2021-01-21 PROCEDURE — 99213 PR OFFICE/OUTPT VISIT, EST, LEVL III, 20-29 MIN: ICD-10-PCS | Mod: S$GLB,,, | Performed by: STUDENT IN AN ORGANIZED HEALTH CARE EDUCATION/TRAINING PROGRAM

## 2021-01-21 RX ORDER — OLMESARTAN MEDOXOMIL AND HYDROCHLOROTHIAZIDE 40/25 40; 25 MG/1; MG/1
1 TABLET ORAL DAILY
Qty: 90 TABLET | Refills: 3 | Status: SHIPPED | OUTPATIENT
Start: 2021-01-21 | End: 2021-07-23 | Stop reason: SDUPTHER

## 2021-01-22 ENCOUNTER — TELEPHONE (OUTPATIENT)
Dept: INTERNAL MEDICINE | Facility: CLINIC | Age: 51
End: 2021-01-22

## 2021-02-23 ENCOUNTER — LAB VISIT (OUTPATIENT)
Dept: LAB | Facility: HOSPITAL | Age: 51
End: 2021-02-23
Attending: STUDENT IN AN ORGANIZED HEALTH CARE EDUCATION/TRAINING PROGRAM
Payer: COMMERCIAL

## 2021-02-23 DIAGNOSIS — R07.9 CHEST PAIN, UNSPECIFIED TYPE: ICD-10-CM

## 2021-02-23 LAB
ALBUMIN SERPL BCP-MCNC: 3.8 G/DL (ref 3.5–5.2)
ALP SERPL-CCNC: 77 U/L (ref 55–135)
ALT SERPL W/O P-5'-P-CCNC: 34 U/L (ref 10–44)
ANION GAP SERPL CALC-SCNC: 8 MMOL/L (ref 8–16)
AST SERPL-CCNC: 24 U/L (ref 10–40)
BASOPHILS # BLD AUTO: 0.02 K/UL (ref 0–0.2)
BASOPHILS NFR BLD: 0.3 % (ref 0–1.9)
BILIRUB SERPL-MCNC: 0.3 MG/DL (ref 0.1–1)
BUN SERPL-MCNC: 16 MG/DL (ref 6–20)
CALCIUM SERPL-MCNC: 9.2 MG/DL (ref 8.7–10.5)
CHLORIDE SERPL-SCNC: 100 MMOL/L (ref 95–110)
CHOLEST SERPL-MCNC: 161 MG/DL (ref 120–199)
CHOLEST/HDLC SERPL: 3.7 {RATIO} (ref 2–5)
CO2 SERPL-SCNC: 29 MMOL/L (ref 23–29)
CREAT SERPL-MCNC: 0.8 MG/DL (ref 0.5–1.4)
DIFFERENTIAL METHOD: ABNORMAL
EOSINOPHIL # BLD AUTO: 0.2 K/UL (ref 0–0.5)
EOSINOPHIL NFR BLD: 2.1 % (ref 0–8)
ERYTHROCYTE [DISTWIDTH] IN BLOOD BY AUTOMATED COUNT: 15.4 % (ref 11.5–14.5)
EST. GFR  (AFRICAN AMERICAN): >60 ML/MIN/1.73 M^2
EST. GFR  (NON AFRICAN AMERICAN): >60 ML/MIN/1.73 M^2
GLUCOSE SERPL-MCNC: 86 MG/DL (ref 70–110)
HCT VFR BLD AUTO: 38.8 % (ref 37–48.5)
HDLC SERPL-MCNC: 44 MG/DL (ref 40–75)
HDLC SERPL: 27.3 % (ref 20–50)
HGB BLD-MCNC: 12 G/DL (ref 12–16)
IMM GRANULOCYTES # BLD AUTO: 0.01 K/UL (ref 0–0.04)
IMM GRANULOCYTES NFR BLD AUTO: 0.1 % (ref 0–0.5)
LDLC SERPL CALC-MCNC: 91.4 MG/DL (ref 63–159)
LYMPHOCYTES # BLD AUTO: 2.8 K/UL (ref 1–4.8)
LYMPHOCYTES NFR BLD: 36 % (ref 18–48)
MCH RBC QN AUTO: 28.4 PG (ref 27–31)
MCHC RBC AUTO-ENTMCNC: 30.9 G/DL (ref 32–36)
MCV RBC AUTO: 92 FL (ref 82–98)
MONOCYTES # BLD AUTO: 0.6 K/UL (ref 0.3–1)
MONOCYTES NFR BLD: 7.2 % (ref 4–15)
NEUTROPHILS # BLD AUTO: 4.1 K/UL (ref 1.8–7.7)
NEUTROPHILS NFR BLD: 54.3 % (ref 38–73)
NONHDLC SERPL-MCNC: 117 MG/DL
NRBC BLD-RTO: 0 /100 WBC
PLATELET # BLD AUTO: 355 K/UL (ref 150–350)
PMV BLD AUTO: 8.8 FL (ref 9.2–12.9)
POTASSIUM SERPL-SCNC: 4.2 MMOL/L (ref 3.5–5.1)
PROT SERPL-MCNC: 7.2 G/DL (ref 6–8.4)
RBC # BLD AUTO: 4.23 M/UL (ref 4–5.4)
SODIUM SERPL-SCNC: 137 MMOL/L (ref 136–145)
TRIGL SERPL-MCNC: 128 MG/DL (ref 30–150)
TROPONIN I SERPL DL<=0.01 NG/ML-MCNC: <0.006 NG/ML (ref 0–0.03)
WBC # BLD AUTO: 7.63 K/UL (ref 3.9–12.7)

## 2021-02-23 PROCEDURE — 80061 LIPID PANEL: CPT

## 2021-02-23 PROCEDURE — 80053 COMPREHEN METABOLIC PANEL: CPT

## 2021-02-23 PROCEDURE — 85025 COMPLETE CBC W/AUTO DIFF WBC: CPT

## 2021-02-23 PROCEDURE — 36415 COLL VENOUS BLD VENIPUNCTURE: CPT

## 2021-02-23 PROCEDURE — 84484 ASSAY OF TROPONIN QUANT: CPT

## 2021-02-25 ENCOUNTER — OFFICE VISIT (OUTPATIENT)
Dept: INTERNAL MEDICINE | Facility: CLINIC | Age: 51
End: 2021-02-25
Payer: COMMERCIAL

## 2021-02-25 ENCOUNTER — TELEPHONE (OUTPATIENT)
Dept: INTERNAL MEDICINE | Facility: CLINIC | Age: 51
End: 2021-02-25

## 2021-02-25 DIAGNOSIS — R94.31 ABNORMAL EKG: Primary | ICD-10-CM

## 2021-02-25 DIAGNOSIS — G47.00 INSOMNIA, UNSPECIFIED TYPE: ICD-10-CM

## 2021-02-25 DIAGNOSIS — I87.2 VENOUS INSUFFICIENCY: ICD-10-CM

## 2021-02-25 PROCEDURE — 99213 PR OFFICE/OUTPT VISIT, EST, LEVL III, 20-29 MIN: ICD-10-PCS | Mod: S$GLB,,, | Performed by: STUDENT IN AN ORGANIZED HEALTH CARE EDUCATION/TRAINING PROGRAM

## 2021-02-25 PROCEDURE — 99213 OFFICE O/P EST LOW 20 MIN: CPT | Mod: S$GLB,,, | Performed by: STUDENT IN AN ORGANIZED HEALTH CARE EDUCATION/TRAINING PROGRAM

## 2021-02-25 PROCEDURE — 99999 PR PBB SHADOW E&M-EST. PATIENT-LVL II: CPT | Mod: PBBFAC,,, | Performed by: STUDENT IN AN ORGANIZED HEALTH CARE EDUCATION/TRAINING PROGRAM

## 2021-02-25 PROCEDURE — 99999 PR PBB SHADOW E&M-EST. PATIENT-LVL II: ICD-10-PCS | Mod: PBBFAC,,, | Performed by: STUDENT IN AN ORGANIZED HEALTH CARE EDUCATION/TRAINING PROGRAM

## 2021-02-25 RX ORDER — ZOLPIDEM TARTRATE 5 MG/1
5 TABLET ORAL NIGHTLY PRN
Qty: 30 TABLET | Refills: 3 | Status: SHIPPED | OUTPATIENT
Start: 2021-02-25 | End: 2021-06-15

## 2021-02-25 RX ORDER — FUROSEMIDE 20 MG/1
20 TABLET ORAL DAILY PRN
Qty: 60 TABLET | Refills: 1 | Status: SHIPPED | OUTPATIENT
Start: 2021-02-25 | End: 2021-07-23 | Stop reason: SDUPTHER

## 2021-02-26 VITALS — OXYGEN SATURATION: 99 % | HEART RATE: 75 BPM | DIASTOLIC BLOOD PRESSURE: 75 MMHG | SYSTOLIC BLOOD PRESSURE: 125 MMHG

## 2021-06-08 ENCOUNTER — TELEPHONE (OUTPATIENT)
Dept: INTERNAL MEDICINE | Facility: CLINIC | Age: 51
End: 2021-06-08

## 2021-06-08 ENCOUNTER — TELEPHONE (OUTPATIENT)
Dept: VASCULAR SURGERY | Facility: CLINIC | Age: 51
End: 2021-06-08

## 2021-06-15 ENCOUNTER — LAB VISIT (OUTPATIENT)
Dept: LAB | Facility: HOSPITAL | Age: 51
End: 2021-06-15
Payer: COMMERCIAL

## 2021-06-15 ENCOUNTER — OFFICE VISIT (OUTPATIENT)
Dept: INTERNAL MEDICINE | Facility: CLINIC | Age: 51
End: 2021-06-15
Payer: COMMERCIAL

## 2021-06-15 VITALS
WEIGHT: 227.94 LBS | HEART RATE: 88 BPM | OXYGEN SATURATION: 97 % | BODY MASS INDEX: 38.91 KG/M2 | DIASTOLIC BLOOD PRESSURE: 76 MMHG | SYSTOLIC BLOOD PRESSURE: 126 MMHG | HEIGHT: 64 IN

## 2021-06-15 DIAGNOSIS — I10 ESSENTIAL HYPERTENSION: ICD-10-CM

## 2021-06-15 DIAGNOSIS — M79.89 LEG SWELLING: ICD-10-CM

## 2021-06-15 DIAGNOSIS — M79.605 PAIN IN BOTH LOWER EXTREMITIES: Primary | ICD-10-CM

## 2021-06-15 DIAGNOSIS — M79.604 PAIN IN BOTH LOWER EXTREMITIES: Primary | ICD-10-CM

## 2021-06-15 LAB
ALBUMIN SERPL BCP-MCNC: 3.8 G/DL (ref 3.5–5.2)
ALP SERPL-CCNC: 68 U/L (ref 55–135)
ALT SERPL W/O P-5'-P-CCNC: 29 U/L (ref 10–44)
ANION GAP SERPL CALC-SCNC: 10 MMOL/L (ref 8–16)
AST SERPL-CCNC: 20 U/L (ref 10–40)
BASOPHILS # BLD AUTO: 0.02 K/UL (ref 0–0.2)
BASOPHILS NFR BLD: 0.3 % (ref 0–1.9)
BILIRUB SERPL-MCNC: 0.3 MG/DL (ref 0.1–1)
BUN SERPL-MCNC: 14 MG/DL (ref 6–20)
CALCIUM SERPL-MCNC: 9.5 MG/DL (ref 8.7–10.5)
CHLORIDE SERPL-SCNC: 101 MMOL/L (ref 95–110)
CO2 SERPL-SCNC: 29 MMOL/L (ref 23–29)
CREAT SERPL-MCNC: 0.8 MG/DL (ref 0.5–1.4)
DIFFERENTIAL METHOD: ABNORMAL
EOSINOPHIL # BLD AUTO: 0.1 K/UL (ref 0–0.5)
EOSINOPHIL NFR BLD: 1.9 % (ref 0–8)
ERYTHROCYTE [DISTWIDTH] IN BLOOD BY AUTOMATED COUNT: 15.7 % (ref 11.5–14.5)
EST. GFR  (AFRICAN AMERICAN): >60 ML/MIN/1.73 M^2
EST. GFR  (NON AFRICAN AMERICAN): >60 ML/MIN/1.73 M^2
GLUCOSE SERPL-MCNC: 124 MG/DL (ref 70–110)
HCT VFR BLD AUTO: 37.9 % (ref 37–48.5)
HGB BLD-MCNC: 11.9 G/DL (ref 12–16)
IMM GRANULOCYTES # BLD AUTO: 0.03 K/UL (ref 0–0.04)
IMM GRANULOCYTES NFR BLD AUTO: 0.4 % (ref 0–0.5)
LYMPHOCYTES # BLD AUTO: 1.9 K/UL (ref 1–4.8)
LYMPHOCYTES NFR BLD: 26.2 % (ref 18–48)
MCH RBC QN AUTO: 27.4 PG (ref 27–31)
MCHC RBC AUTO-ENTMCNC: 31.4 G/DL (ref 32–36)
MCV RBC AUTO: 87 FL (ref 82–98)
MONOCYTES # BLD AUTO: 0.5 K/UL (ref 0.3–1)
MONOCYTES NFR BLD: 6.5 % (ref 4–15)
NEUTROPHILS # BLD AUTO: 4.6 K/UL (ref 1.8–7.7)
NEUTROPHILS NFR BLD: 64.7 % (ref 38–73)
NRBC BLD-RTO: 0 /100 WBC
PLATELET # BLD AUTO: 341 K/UL (ref 150–450)
PMV BLD AUTO: 8.8 FL (ref 9.2–12.9)
POTASSIUM SERPL-SCNC: 3.8 MMOL/L (ref 3.5–5.1)
PROT SERPL-MCNC: 7.1 G/DL (ref 6–8.4)
RBC # BLD AUTO: 4.34 M/UL (ref 4–5.4)
SODIUM SERPL-SCNC: 140 MMOL/L (ref 136–145)
WBC # BLD AUTO: 7.18 K/UL (ref 3.9–12.7)

## 2021-06-15 PROCEDURE — 99999 PR PBB SHADOW E&M-EST. PATIENT-LVL IV: CPT | Mod: PBBFAC,,, | Performed by: PHYSICIAN ASSISTANT

## 2021-06-15 PROCEDURE — 99214 PR OFFICE/OUTPT VISIT, EST, LEVL IV, 30-39 MIN: ICD-10-PCS | Mod: S$GLB,,, | Performed by: PHYSICIAN ASSISTANT

## 2021-06-15 PROCEDURE — 85025 COMPLETE CBC W/AUTO DIFF WBC: CPT | Performed by: PHYSICIAN ASSISTANT

## 2021-06-15 PROCEDURE — 99999 PR PBB SHADOW E&M-EST. PATIENT-LVL IV: ICD-10-PCS | Mod: PBBFAC,,, | Performed by: PHYSICIAN ASSISTANT

## 2021-06-15 PROCEDURE — 36415 COLL VENOUS BLD VENIPUNCTURE: CPT | Performed by: PHYSICIAN ASSISTANT

## 2021-06-15 PROCEDURE — 80053 COMPREHEN METABOLIC PANEL: CPT | Performed by: PHYSICIAN ASSISTANT

## 2021-06-15 PROCEDURE — 99214 OFFICE O/P EST MOD 30 MIN: CPT | Mod: S$GLB,,, | Performed by: PHYSICIAN ASSISTANT

## 2021-06-15 RX ORDER — EFINACONAZOLE 100 MG/ML
1 SOLUTION TOPICAL DAILY
COMMUNITY
Start: 2021-05-11 | End: 2022-11-03

## 2021-06-15 RX ORDER — ZOLPIDEM TARTRATE 10 MG/1
10 TABLET ORAL NIGHTLY PRN
COMMUNITY
Start: 2021-06-02 | End: 2021-10-11 | Stop reason: SDUPTHER

## 2021-06-15 RX ORDER — FENOPROFEN CALCIUM 600 MG/1
TABLET, FILM COATED ORAL
COMMUNITY
Start: 2021-05-14 | End: 2022-11-03

## 2021-06-16 ENCOUNTER — HOSPITAL ENCOUNTER (OUTPATIENT)
Dept: RADIOLOGY | Facility: HOSPITAL | Age: 51
Discharge: HOME | End: 2021-06-16
Attending: PHYSICIAN ASSISTANT
Payer: COMMERCIAL

## 2021-06-16 ENCOUNTER — HOSPITAL ENCOUNTER (OUTPATIENT)
Dept: CARDIOLOGY | Facility: HOSPITAL | Age: 51
Discharge: HOME | End: 2021-06-16
Attending: PHYSICIAN ASSISTANT
Payer: COMMERCIAL

## 2021-06-16 DIAGNOSIS — M79.605 PAIN IN BOTH LOWER EXTREMITIES: ICD-10-CM

## 2021-06-16 DIAGNOSIS — M79.604 PAIN IN BOTH LOWER EXTREMITIES: ICD-10-CM

## 2021-06-16 DIAGNOSIS — M79.89 LEG SWELLING: ICD-10-CM

## 2021-06-16 LAB
LEFT ABI: 1.15
LEFT ARM BP: 100 MMHG
LEFT DORSALIS PEDIS: 109 MMHG
LEFT POSTERIOR TIBIAL: 116 MMHG
RIGHT ABI: 1.15
RIGHT ARM BP: 101 MMHG
RIGHT DORSALIS PEDIS: 113 MMHG
RIGHT POSTERIOR TIBIAL: 116 MMHG

## 2021-06-16 PROCEDURE — 93922 ANKLE BRACHIAL INDICES (ABI): ICD-10-PCS | Mod: 26,,, | Performed by: INTERNAL MEDICINE

## 2021-06-16 PROCEDURE — 93922 UPR/L XTREMITY ART 2 LEVELS: CPT

## 2021-06-16 PROCEDURE — 93922 UPR/L XTREMITY ART 2 LEVELS: CPT | Mod: 26,,, | Performed by: INTERNAL MEDICINE

## 2021-06-16 PROCEDURE — 93970 EXTREMITY STUDY: CPT | Mod: TC

## 2021-06-16 PROCEDURE — 93970 US LOWER EXTREMITY VEINS BILATERAL: ICD-10-PCS | Mod: 26,,, | Performed by: RADIOLOGY

## 2021-06-16 PROCEDURE — 93970 EXTREMITY STUDY: CPT | Mod: 26,,, | Performed by: RADIOLOGY

## 2021-07-23 ENCOUNTER — OFFICE VISIT (OUTPATIENT)
Dept: CARDIOLOGY | Facility: CLINIC | Age: 51
End: 2021-07-23
Payer: COMMERCIAL

## 2021-07-23 VITALS
DIASTOLIC BLOOD PRESSURE: 60 MMHG | HEIGHT: 64 IN | SYSTOLIC BLOOD PRESSURE: 110 MMHG | HEART RATE: 74 BPM | WEIGHT: 228.38 LBS | BODY MASS INDEX: 38.99 KG/M2 | OXYGEN SATURATION: 99 %

## 2021-07-23 DIAGNOSIS — I87.2 VENOUS INSUFFICIENCY: ICD-10-CM

## 2021-07-23 DIAGNOSIS — M79.89 LEG SWELLING: ICD-10-CM

## 2021-07-23 DIAGNOSIS — I10 ESSENTIAL HYPERTENSION: ICD-10-CM

## 2021-07-23 DIAGNOSIS — G89.29 CHRONIC PAIN OF LEFT ANKLE: ICD-10-CM

## 2021-07-23 DIAGNOSIS — M79.604 PAIN IN BOTH LOWER EXTREMITIES: ICD-10-CM

## 2021-07-23 DIAGNOSIS — M79.605 PAIN IN BOTH LOWER EXTREMITIES: ICD-10-CM

## 2021-07-23 DIAGNOSIS — E66.9 OBESITY (BMI 30-39.9): ICD-10-CM

## 2021-07-23 DIAGNOSIS — I89.0 LYMPHEDEMA OF BOTH LOWER EXTREMITIES: ICD-10-CM

## 2021-07-23 DIAGNOSIS — I10 HYPERTENSION, UNSPECIFIED TYPE: ICD-10-CM

## 2021-07-23 DIAGNOSIS — M25.572 CHRONIC PAIN OF LEFT ANKLE: ICD-10-CM

## 2021-07-23 DIAGNOSIS — E66.01 SEVERE OBESITY (BMI 35.0-39.9) WITH COMORBIDITY: ICD-10-CM

## 2021-07-23 DIAGNOSIS — M25.472 EDEMA OF LEFT ANKLE: Primary | ICD-10-CM

## 2021-07-23 PROCEDURE — 99999 PR PBB SHADOW E&M-EST. PATIENT-LVL IV: ICD-10-PCS | Mod: PBBFAC,,, | Performed by: INTERNAL MEDICINE

## 2021-07-23 PROCEDURE — 99205 OFFICE O/P NEW HI 60 MIN: CPT | Mod: S$GLB,,, | Performed by: INTERNAL MEDICINE

## 2021-07-23 PROCEDURE — 99205 PR OFFICE/OUTPT VISIT, NEW, LEVL V, 60-74 MIN: ICD-10-PCS | Mod: S$GLB,,, | Performed by: INTERNAL MEDICINE

## 2021-07-23 PROCEDURE — 99999 PR PBB SHADOW E&M-EST. PATIENT-LVL IV: CPT | Mod: PBBFAC,,, | Performed by: INTERNAL MEDICINE

## 2021-07-23 RX ORDER — OLMESARTAN MEDOXOMIL AND HYDROCHLOROTHIAZIDE 40/25 40; 25 MG/1; MG/1
1 TABLET ORAL DAILY
Qty: 90 TABLET | Refills: 3 | Status: SHIPPED | OUTPATIENT
Start: 2021-07-23 | End: 2021-10-11 | Stop reason: SDUPTHER

## 2021-07-23 RX ORDER — FUROSEMIDE 20 MG/1
20 TABLET ORAL DAILY PRN
Qty: 60 TABLET | Refills: 3 | Status: SHIPPED | OUTPATIENT
Start: 2021-07-23 | End: 2023-07-12

## 2021-07-28 ENCOUNTER — OFFICE VISIT (OUTPATIENT)
Dept: URGENT CARE | Facility: CLINIC | Age: 51
End: 2021-07-28
Payer: COMMERCIAL

## 2021-07-28 VITALS
HEIGHT: 64 IN | BODY MASS INDEX: 38.41 KG/M2 | RESPIRATION RATE: 16 BRPM | HEART RATE: 89 BPM | SYSTOLIC BLOOD PRESSURE: 126 MMHG | OXYGEN SATURATION: 97 % | DIASTOLIC BLOOD PRESSURE: 83 MMHG | WEIGHT: 225 LBS | TEMPERATURE: 98 F

## 2021-07-28 DIAGNOSIS — J02.8 SORE THROAT (VIRAL): ICD-10-CM

## 2021-07-28 DIAGNOSIS — Z20.822 CLOSE EXPOSURE TO COVID-19 VIRUS: ICD-10-CM

## 2021-07-28 DIAGNOSIS — R52 GENERALIZED BODY ACHES: Primary | ICD-10-CM

## 2021-07-28 DIAGNOSIS — B97.89 SORE THROAT (VIRAL): ICD-10-CM

## 2021-07-28 LAB
CTP QC/QA: YES
SARS-COV-2 RDRP RESP QL NAA+PROBE: NEGATIVE

## 2021-07-28 PROCEDURE — U0002: ICD-10-PCS | Mod: QW,S$GLB,, | Performed by: INTERNAL MEDICINE

## 2021-07-28 PROCEDURE — 99212 OFFICE O/P EST SF 10 MIN: CPT | Mod: S$GLB,,, | Performed by: INTERNAL MEDICINE

## 2021-07-28 PROCEDURE — 99212 PR OFFICE/OUTPT VISIT, EST, LEVL II, 10-19 MIN: ICD-10-PCS | Mod: S$GLB,,, | Performed by: INTERNAL MEDICINE

## 2021-07-28 PROCEDURE — U0002 COVID-19 LAB TEST NON-CDC: HCPCS | Mod: QW,S$GLB,, | Performed by: INTERNAL MEDICINE

## 2021-08-23 ENCOUNTER — TELEPHONE (OUTPATIENT)
Dept: CARDIOLOGY | Facility: CLINIC | Age: 51
End: 2021-08-23

## 2021-08-26 ENCOUNTER — OFFICE VISIT (OUTPATIENT)
Dept: INTERNAL MEDICINE | Facility: CLINIC | Age: 51
End: 2021-08-26
Payer: COMMERCIAL

## 2021-08-26 ENCOUNTER — HOSPITAL ENCOUNTER (OUTPATIENT)
Dept: RADIOLOGY | Facility: HOSPITAL | Age: 51
Discharge: HOME | End: 2021-08-26
Attending: NURSE PRACTITIONER
Payer: COMMERCIAL

## 2021-08-26 VITALS
HEART RATE: 83 BPM | OXYGEN SATURATION: 99 % | HEIGHT: 64 IN | DIASTOLIC BLOOD PRESSURE: 74 MMHG | BODY MASS INDEX: 38.5 KG/M2 | WEIGHT: 225.5 LBS | SYSTOLIC BLOOD PRESSURE: 110 MMHG

## 2021-08-26 DIAGNOSIS — J45.20 MILD INTERMITTENT ASTHMA WITHOUT COMPLICATION: ICD-10-CM

## 2021-08-26 DIAGNOSIS — I10 ESSENTIAL HYPERTENSION: ICD-10-CM

## 2021-08-26 DIAGNOSIS — R06.02 SOB (SHORTNESS OF BREATH): ICD-10-CM

## 2021-08-26 DIAGNOSIS — U07.1 COVID-19: Primary | ICD-10-CM

## 2021-08-26 DIAGNOSIS — E66.01 SEVERE OBESITY (BMI 35.0-39.9) WITH COMORBIDITY: ICD-10-CM

## 2021-08-26 DIAGNOSIS — R00.2 PALPITATIONS: ICD-10-CM

## 2021-08-26 PROCEDURE — 93005 EKG 12-LEAD: ICD-10-PCS | Mod: S$GLB,,, | Performed by: NURSE PRACTITIONER

## 2021-08-26 PROCEDURE — 71046 X-RAY EXAM CHEST 2 VIEWS: CPT | Mod: 26,,, | Performed by: RADIOLOGY

## 2021-08-26 PROCEDURE — 93005 ELECTROCARDIOGRAM TRACING: CPT | Mod: S$GLB,,, | Performed by: NURSE PRACTITIONER

## 2021-08-26 PROCEDURE — 99214 PR OFFICE/OUTPT VISIT, EST, LEVL IV, 30-39 MIN: ICD-10-PCS | Mod: S$GLB,,, | Performed by: NURSE PRACTITIONER

## 2021-08-26 PROCEDURE — 93010 EKG 12-LEAD: ICD-10-PCS | Mod: S$GLB,,, | Performed by: INTERNAL MEDICINE

## 2021-08-26 PROCEDURE — 99999 PR PBB SHADOW E&M-EST. PATIENT-LVL IV: ICD-10-PCS | Mod: PBBFAC,,, | Performed by: NURSE PRACTITIONER

## 2021-08-26 PROCEDURE — 99999 PR PBB SHADOW E&M-EST. PATIENT-LVL IV: CPT | Mod: PBBFAC,,, | Performed by: NURSE PRACTITIONER

## 2021-08-26 PROCEDURE — 93010 ELECTROCARDIOGRAM REPORT: CPT | Mod: S$GLB,,, | Performed by: INTERNAL MEDICINE

## 2021-08-26 PROCEDURE — 71046 XR CHEST PA AND LATERAL: ICD-10-PCS | Mod: 26,,, | Performed by: RADIOLOGY

## 2021-08-26 PROCEDURE — 99214 OFFICE O/P EST MOD 30 MIN: CPT | Mod: S$GLB,,, | Performed by: NURSE PRACTITIONER

## 2021-08-26 PROCEDURE — 71046 X-RAY EXAM CHEST 2 VIEWS: CPT | Mod: TC

## 2021-08-26 RX ORDER — LEVALBUTEROL INHALATION SOLUTION 1.25 MG/3ML
1 SOLUTION RESPIRATORY (INHALATION) EVERY 6 HOURS PRN
Qty: 1 BOX | Refills: 1 | Status: SHIPPED | OUTPATIENT
Start: 2021-08-26 | End: 2022-11-03 | Stop reason: SDUPTHER

## 2021-09-07 ENCOUNTER — TELEPHONE (OUTPATIENT)
Dept: INTERNAL MEDICINE | Facility: CLINIC | Age: 51
End: 2021-09-07

## 2021-09-09 ENCOUNTER — TELEPHONE (OUTPATIENT)
Dept: CARDIOLOGY | Facility: HOSPITAL | Age: 51
End: 2021-09-09

## 2021-09-30 ENCOUNTER — HOSPITAL ENCOUNTER (OUTPATIENT)
Dept: RADIOLOGY | Facility: HOSPITAL | Age: 51
Discharge: HOME | End: 2021-09-30
Attending: OBSTETRICS & GYNECOLOGY
Payer: COMMERCIAL

## 2021-09-30 VITALS — BODY MASS INDEX: 37.9 KG/M2 | HEIGHT: 64 IN | WEIGHT: 222 LBS

## 2021-09-30 DIAGNOSIS — Z01.419 ENCOUNTER FOR ANNUAL ROUTINE GYNECOLOGICAL EXAMINATION: ICD-10-CM

## 2021-09-30 DIAGNOSIS — Z12.31 SCREENING MAMMOGRAM, ENCOUNTER FOR: ICD-10-CM

## 2021-09-30 PROCEDURE — 77063 BREAST TOMOSYNTHESIS BI: CPT | Mod: 26,,, | Performed by: RADIOLOGY

## 2021-09-30 PROCEDURE — 77067 SCR MAMMO BI INCL CAD: CPT | Mod: 26,,, | Performed by: RADIOLOGY

## 2021-09-30 PROCEDURE — 77067 SCR MAMMO BI INCL CAD: CPT | Mod: TC

## 2021-09-30 PROCEDURE — 77063 MAMMO DIGITAL SCREENING BILAT WITH TOMO: ICD-10-PCS | Mod: 26,,, | Performed by: RADIOLOGY

## 2021-09-30 PROCEDURE — 77067 MAMMO DIGITAL SCREENING BILAT WITH TOMO: ICD-10-PCS | Mod: 26,,, | Performed by: RADIOLOGY

## 2021-10-11 ENCOUNTER — OFFICE VISIT (OUTPATIENT)
Dept: INTERNAL MEDICINE | Facility: CLINIC | Age: 51
End: 2021-10-11
Payer: COMMERCIAL

## 2021-10-11 DIAGNOSIS — K21.9 GASTROESOPHAGEAL REFLUX DISEASE, UNSPECIFIED WHETHER ESOPHAGITIS PRESENT: Primary | ICD-10-CM

## 2021-10-11 DIAGNOSIS — I10 HYPERTENSION, UNSPECIFIED TYPE: ICD-10-CM

## 2021-10-11 DIAGNOSIS — F32.A DEPRESSION, UNSPECIFIED DEPRESSION TYPE: ICD-10-CM

## 2021-10-11 DIAGNOSIS — G47.00 INSOMNIA, UNSPECIFIED TYPE: ICD-10-CM

## 2021-10-11 PROCEDURE — 99999 PR PBB SHADOW E&M-EST. PATIENT-LVL II: ICD-10-PCS | Mod: PBBFAC,,, | Performed by: STUDENT IN AN ORGANIZED HEALTH CARE EDUCATION/TRAINING PROGRAM

## 2021-10-11 PROCEDURE — 99999 PR PBB SHADOW E&M-EST. PATIENT-LVL II: CPT | Mod: PBBFAC,,, | Performed by: STUDENT IN AN ORGANIZED HEALTH CARE EDUCATION/TRAINING PROGRAM

## 2021-10-11 PROCEDURE — 99213 OFFICE O/P EST LOW 20 MIN: CPT | Mod: S$GLB,,, | Performed by: STUDENT IN AN ORGANIZED HEALTH CARE EDUCATION/TRAINING PROGRAM

## 2021-10-11 PROCEDURE — 99213 PR OFFICE/OUTPT VISIT, EST, LEVL III, 20-29 MIN: ICD-10-PCS | Mod: S$GLB,,, | Performed by: STUDENT IN AN ORGANIZED HEALTH CARE EDUCATION/TRAINING PROGRAM

## 2021-10-11 RX ORDER — ZOLPIDEM TARTRATE 5 MG/1
5 TABLET ORAL NIGHTLY PRN
Qty: 15 TABLET | Refills: 0 | Status: SHIPPED | OUTPATIENT
Start: 2021-10-11 | End: 2022-11-03 | Stop reason: HOSPADM

## 2021-10-11 RX ORDER — PANTOPRAZOLE SODIUM 40 MG/1
40 TABLET, DELAYED RELEASE ORAL DAILY
Qty: 30 TABLET | Refills: 11 | Status: SHIPPED | OUTPATIENT
Start: 2021-10-11 | End: 2022-08-04 | Stop reason: SDUPTHER

## 2021-10-11 RX ORDER — BUPROPION HYDROCHLORIDE 150 MG/1
150 TABLET, EXTENDED RELEASE ORAL 2 TIMES DAILY
Qty: 60 TABLET | Refills: 11 | Status: SHIPPED | OUTPATIENT
Start: 2021-10-11 | End: 2022-08-04 | Stop reason: SDUPTHER

## 2021-10-11 RX ORDER — OLMESARTAN MEDOXOMIL AND HYDROCHLOROTHIAZIDE 40/25 40; 25 MG/1; MG/1
1 TABLET ORAL DAILY
Qty: 90 TABLET | Refills: 3 | Status: SHIPPED | OUTPATIENT
Start: 2021-10-11 | End: 2022-08-04 | Stop reason: SDUPTHER

## 2021-10-13 VITALS
HEART RATE: 70 BPM | BODY MASS INDEX: 38.64 KG/M2 | DIASTOLIC BLOOD PRESSURE: 60 MMHG | WEIGHT: 225.06 LBS | SYSTOLIC BLOOD PRESSURE: 130 MMHG

## 2021-12-08 ENCOUNTER — TELEPHONE (OUTPATIENT)
Dept: INTERNAL MEDICINE | Facility: CLINIC | Age: 51
End: 2021-12-08
Payer: COMMERCIAL

## 2021-12-08 DIAGNOSIS — K64.9 HEMORRHOIDS, UNSPECIFIED HEMORRHOID TYPE: Primary | ICD-10-CM

## 2022-02-23 NOTE — TELEPHONE ENCOUNTER
----- Message from Frances Felix sent at 9/17/2018 10:51 AM CDT -----  Contact: self, [phone]  Patient requests her Adipex prescription refill sent to [facility] pharmacy. Please advise.     Anesthesia Pre Eval Note    Anesthesia ROS/Med Hx        Anesthetic Complication History:  Patient does not have a history of anesthetic complications      Pulmonary Review:  Patient does not have a pulmonary history      Neuro/Psych Review:    Positive for psychiatric history - Anxiety    Cardiovascular Review:  Exercise tolerance: good (>4 METS)  Positive for hypertension  Positive for hyperlipidemia    GI/HEPATIC/RENAL Review:    Positive for GERD    End/Other Review:  Comments: Polycythemia vera  Additional Results:     ALLERGIES:   -- Seasonal -- Other (See Comments)    --  Sneeze, itchy eyes        Relevant Problems   No relevant active problems       Physical Exam     Airway   Mallampati: II  TM Distance: <3 FB  Neck ROM: Full  TMJ Mobility: Good    Cardiovascular  Cardiovascular exam normal    General Assessment  General Assessment: Alert and oriented and No acute distress    Dental Exam  Dental exam normal    Pulmonary Exam  Pulmonary exam normal    Abdominal Exam    Patient Demonstrates:  Obese      Anesthesia Plan:    ASA Status: 2  Anesthesia Type: General    Premedication: None      Post-op Pain Management: Per Surgeon      Checklist  Reviewed: Past Med History, Allergies, Medications, Problem list, NPO Status and Patient Summary  Consent/Risks Discussed Statement:  The proposed anesthetic plan, including its risks and benefits, have been discussed with the Patient along with the risks and benefits of alternatives. Questions were encouraged and answered and the patient and/or representative understands and agrees to proceed.        I discussed with the patient (and/or patient's legal representative) the risks and benefits of the proposed anesthesia plan, General, which may include services performed by other anesthesia providers.    Alternative anesthesia plans, if available, were reviewed with the patient (and/or patient's legal representative). Discussion has been held with the patient (and/or  patient's legal representative) regarding risks of anesthesia, which include emergent situations that may require change in anesthesia plan.  The patient (and/or patient's legal representative) has indicated understanding, his/her questions have been answered, and he/she wishes to proceed with the planned anesthetic.

## 2022-04-27 ENCOUNTER — HOSPITAL ENCOUNTER (EMERGENCY)
Facility: OTHER | Age: 52
Discharge: HOME | End: 2022-04-27
Attending: EMERGENCY MEDICINE
Payer: COMMERCIAL

## 2022-04-27 VITALS
HEART RATE: 97 BPM | BODY MASS INDEX: 38.41 KG/M2 | DIASTOLIC BLOOD PRESSURE: 78 MMHG | HEIGHT: 64 IN | TEMPERATURE: 99 F | SYSTOLIC BLOOD PRESSURE: 139 MMHG | OXYGEN SATURATION: 97 % | WEIGHT: 225 LBS | RESPIRATION RATE: 17 BRPM

## 2022-04-27 DIAGNOSIS — R51.9 ACUTE NONINTRACTABLE HEADACHE, UNSPECIFIED HEADACHE TYPE: Primary | ICD-10-CM

## 2022-04-27 PROCEDURE — 99284 EMERGENCY DEPT VISIT MOD MDM: CPT

## 2022-04-27 PROCEDURE — 96372 THER/PROPH/DIAG INJ SC/IM: CPT | Performed by: EMERGENCY MEDICINE

## 2022-04-27 PROCEDURE — 63600175 PHARM REV CODE 636 W HCPCS: Performed by: EMERGENCY MEDICINE

## 2022-04-27 PROCEDURE — 25000003 PHARM REV CODE 250: Performed by: EMERGENCY MEDICINE

## 2022-04-27 PROCEDURE — 25000003 PHARM REV CODE 250: Performed by: PHYSICIAN ASSISTANT

## 2022-04-27 RX ORDER — PROCHLORPERAZINE MALEATE 10 MG
10 TABLET ORAL EVERY 6 HOURS PRN
Qty: 15 TABLET | Refills: 0 | Status: SHIPPED | OUTPATIENT
Start: 2022-04-27 | End: 2022-11-03

## 2022-04-27 RX ORDER — KETOROLAC TROMETHAMINE 30 MG/ML
15 INJECTION, SOLUTION INTRAMUSCULAR; INTRAVENOUS
Status: COMPLETED | OUTPATIENT
Start: 2022-04-27 | End: 2022-04-27

## 2022-04-27 RX ORDER — BUTALBITAL, ACETAMINOPHEN AND CAFFEINE 50; 325; 40 MG/1; MG/1; MG/1
1 CAPSULE ORAL 3 TIMES DAILY PRN
Qty: 18 CAPSULE | Refills: 0 | Status: SHIPPED | OUTPATIENT
Start: 2022-04-27

## 2022-04-27 RX ORDER — PROCHLORPERAZINE MALEATE 5 MG
10 TABLET ORAL
Status: COMPLETED | OUTPATIENT
Start: 2022-04-27 | End: 2022-04-27

## 2022-04-27 RX ORDER — BUTALBITAL, ACETAMINOPHEN AND CAFFEINE 50; 325; 40 MG/1; MG/1; MG/1
2 TABLET ORAL
Status: COMPLETED | OUTPATIENT
Start: 2022-04-27 | End: 2022-04-27

## 2022-04-27 RX ORDER — DIAZEPAM 5 MG/1
5 TABLET ORAL
Status: COMPLETED | OUTPATIENT
Start: 2022-04-27 | End: 2022-04-27

## 2022-04-27 RX ADMIN — KETOROLAC TROMETHAMINE 15 MG: 30 INJECTION, SOLUTION INTRAMUSCULAR at 10:04

## 2022-04-27 RX ADMIN — BUTALBITAL, ACETAMINOPHEN, AND CAFFEINE 2 TABLET: 50; 325; 40 TABLET ORAL at 10:04

## 2022-04-27 RX ADMIN — DIAZEPAM 5 MG: 5 TABLET ORAL at 10:04

## 2022-04-27 RX ADMIN — PROCHLORPERAZINE MALEATE 10 MG: 5 TABLET ORAL at 10:04

## 2022-04-28 NOTE — ED PROVIDER NOTES
Encounter Date: 4/27/2022    SCRIBE #1 NOTE: I, Dia Land, am scribing for, and in the presence of,  Anitra Mathias MD. I have scribed the following portions of the note - Other sections scribed: HPI, ROS, PE.       History     Chief Complaint   Patient presents with    Headache     R sided headache from top of head down into neck that began this AM after waking up. Pt states that pain is constant with intermittent episodes of sharp pains; pt denies any injury     Nadine M Claude is a 51 y.o. female, with a PMHx of HTN, GERD, who presents to the ED with a headache. Patient reports an intermittent, sharp, moderate right sided headache that radiates down to her right ear, neck, and throat region today. She has tried Excedrin and ear drops without relief. No other exacerbating or alleviating factors. No recent falls. Denies nausea, vomiting, or other associated symptoms. No recent injuries.     The history is provided by the patient.     Review of patient's allergies indicates:   Allergen Reactions    Codeine Hallucinations     Past Medical History:   Diagnosis Date    Back pain     Depression     GERD (gastroesophageal reflux disease)     Hypertension     Miscarriage      Past Surgical History:   Procedure Laterality Date    breast augmentation      DILATION AND CURETTAGE OF UTERUS      heel spurs  11/2018    TOTAL REDUCTION MAMMOPLASTY Bilateral 1994     Family History   Problem Relation Age of Onset    Colon cancer Paternal Grandfather     Diabetes Mother     Heart disease Father     Aortic aneurysm Brother     Cerebral aneurysm Maternal Aunt     Diabetes Maternal Grandfather      Social History     Tobacco Use    Smoking status: Never Smoker    Smokeless tobacco: Never Used   Substance Use Topics    Alcohol use: Yes     Comment: once a month    Drug use: Yes     Types: Marijuana     Comment: once a month     Review of Systems   Constitutional: Negative for activity change, appetite change,  chills, diaphoresis and fever.   HENT: Positive for ear pain (R). Negative for congestion and trouble swallowing.         +right sided throat pain.   Eyes: Negative for photophobia and visual disturbance.   Respiratory: Negative for cough, chest tightness and shortness of breath.    Cardiovascular: Negative for chest pain.   Gastrointestinal: Negative for abdominal pain, nausea and vomiting.   Endocrine: Negative for polydipsia and polyuria.   Genitourinary: Negative for difficulty urinating and flank pain.   Musculoskeletal: Positive for neck pain (R). Negative for back pain.   Skin: Negative for rash.   Neurological: Positive for headaches (right sided, with radiation to ear, neck, throat). Negative for weakness.   Psychiatric/Behavioral: Negative for confusion.       Physical Exam     Initial Vitals [04/27/22 2013]   BP Pulse Resp Temp SpO2   (!) 134/95 97 18 98.9 °F (37.2 °C) 97 %      MAP       --         Physical Exam    Nursing note and vitals reviewed.  Constitutional: She appears well-developed. She is Obese . She is cooperative.   HENT:   Head: Atraumatic.   Right Ear: Tympanic membrane normal.   Left Ear: Tympanic membrane normal.   Mouth/Throat: Oropharynx is clear and moist.   Eyes: Conjunctivae and lids are normal.   No photophobia.    Neck: Neck supple.   No meningismus.    Normal range of motion.  Cardiovascular: Normal rate.   Pulmonary/Chest: No respiratory distress.   Abdominal: Abdomen is soft.   Musculoskeletal:         General: Tenderness present. Normal range of motion.      Cervical back: Normal range of motion and neck supple.      Comments: Pain illicited w palpation of posterior cervical paraspinal region on R     Neurological: She is alert and oriented to person, place, and time. She has normal strength. No cranial nerve deficit or sensory deficit.   Normal speech. No facial droop.    Skin: No rash noted.   Psychiatric: She has a normal mood and affect. Her speech is normal and behavior  is normal.         ED Course   Procedures  Labs Reviewed - No data to display       Imaging Results    None          Medications   butalbital-acetaminophen-caffeine -40 mg per tablet 2 tablet (2 tablets Oral Given 4/27/22 2230)   ketorolac injection 15 mg (15 mg Intramuscular Given 4/27/22 2215)   diazePAM tablet 5 mg (5 mg Oral Given 4/27/22 2215)   prochlorperazine tablet 10 mg (10 mg Oral Given 4/27/22 2215)     Medical Decision Making:   History:   Old Medical Records: I decided to obtain old medical records.  Initial Assessment:   Urgent evaluation of 51-year-old female here with right-sided neck spasm/headache/ear pain.  No focal findings on exam to suggest serious injury/pathology.  Suspect migraine versus muscle spasm, no neuro deficits to suggest intracranial issue at this time.  Will administer analgesics and reassess.          Scribe Attestation:   Scribe #1: I performed the above scribed service and the documentation accurately describes the services I performed. I attest to the accuracy of the note.               I, Mey, personally performed the services described in this documentation. All medical record entries made by the scribe were at my direction and in my presence. I have reviewed the chart and agree that the record reflects my personal performance and is accurate and complete.    Clinical Impression:   Final diagnoses:  [R51.9] Acute nonintractable headache, unspecified headache type (Primary)          ED Disposition Condition    Discharge Stable        ED Prescriptions     Medication Sig Dispense Start Date End Date Auth. Provider    butalbital-acetaminophen-caff -40 mg -40 mg Cap Take 1 capsule by mouth 3 (three) times daily as needed (for headache not to exceed 6 capsules per day). 18 capsule 4/27/2022  Anitra Mathias MD    prochlorperazine (COMPAZINE) 10 MG tablet Take 1 tablet (10 mg total) by mouth every 6 (six) hours as needed (headache). 15 tablet 4/27/2022   Anitra Mathias MD        Follow-up Information     Follow up With Specialties Details Why Contact Info    Angel Bolaños MD Internal Medicine Schedule an appointment as soon as possible for a visit  If symptoms worsen [address]  [facility] [address]  [phone]             Anitra Mathias MD  04/28/22 0103

## 2022-04-28 NOTE — FIRST PROVIDER EVALUATION
Emergency Department TeleTriage Encounter Note      CHIEF COMPLAINT    Chief Complaint   Patient presents with    Headache     R sided headache from top of head down into neck that began this AM after waking up. Pt states that pain is constant with intermittent episodes of sharp pains; pt denies any injury       VITAL SIGNS   Initial Vitals [04/27/22 2013]   BP Pulse Resp Temp SpO2   (!) 134/95 97 18 98.9 °F (37.2 °C) 97 %      MAP       --            ALLERGIES    Review of patient's allergies indicates:   Allergen Reactions    Codeine Hallucinations       PROVIDER TRIAGE NOTE  51 year old female to the ER for evaluation of pain to the right side of her head.  Pain radiates from the top of her head down to her ear and into her neck.  She denies any trauma or injury.  She tried Excedrin at home without relief.  Patient is afebrile.  Vital signs normal.      ORDERS  Labs Reviewed - No data to display    ED Orders (720h ago, onward)    Start Ordered     Status Ordering Provider    04/27/22 2030 04/27/22 2022  butalbital-acetaminophen-caffeine -40 mg per tablet 2 tablet  ED 1 Time         Ordered YFN WOOD            Virtual Visit Note: The provider triage portion of this emergency department evaluation and documentation was performed via [employer], a HIPAA-compliant telemedicine application, in concert with a tele-presenter in the room. A face to face patient evaluation with one of my colleagues will occur once the patient is placed in an emergency department room.      DISCLAIMER: This note was prepared with [employer] voice recognition transcription software. Garbled syntax, mangled pronouns, and other bizarre constructions may be attributed to that software system.

## 2022-04-28 NOTE — ED TRIAGE NOTES
Pt presents to the ED c/o headache. Pt reports developing headache this morning when she woke up. States that it is a shooting stabbing pain that beings in her right mandible and goes to her temple and down her neck. Reports taking Excedrin at home with no relief. Reports right ear pain. Denies any other complaints at this time. AAOx4

## 2022-05-27 ENCOUNTER — HOSPITAL ENCOUNTER (EMERGENCY)
Facility: HOSPITAL | Age: 52
Discharge: HOME | End: 2022-05-27
Attending: EMERGENCY MEDICINE
Payer: COMMERCIAL

## 2022-05-27 ENCOUNTER — TELEPHONE (OUTPATIENT)
Dept: INTERNAL MEDICINE | Facility: CLINIC | Age: 52
End: 2022-05-27
Payer: COMMERCIAL

## 2022-05-27 VITALS
WEIGHT: 225 LBS | HEART RATE: 96 BPM | DIASTOLIC BLOOD PRESSURE: 74 MMHG | BODY MASS INDEX: 38.62 KG/M2 | RESPIRATION RATE: 18 BRPM | SYSTOLIC BLOOD PRESSURE: 118 MMHG | OXYGEN SATURATION: 99 % | TEMPERATURE: 98 F

## 2022-05-27 DIAGNOSIS — M25.559 HIP PAIN: ICD-10-CM

## 2022-05-27 DIAGNOSIS — R07.9 CHEST PAIN: Primary | ICD-10-CM

## 2022-05-27 LAB
ALBUMIN SERPL BCP-MCNC: 3.3 G/DL (ref 3.5–5.2)
ALP SERPL-CCNC: 69 U/L (ref 55–135)
ALT SERPL W/O P-5'-P-CCNC: 22 U/L (ref 10–44)
ANION GAP SERPL CALC-SCNC: 9 MMOL/L (ref 8–16)
AST SERPL-CCNC: 19 U/L (ref 10–40)
BASOPHILS # BLD AUTO: 0.01 K/UL (ref 0–0.2)
BASOPHILS NFR BLD: 0.1 % (ref 0–1.9)
BILIRUB SERPL-MCNC: 0.2 MG/DL (ref 0.1–1)
BILIRUB UR QL STRIP: NEGATIVE
BUN SERPL-MCNC: 17 MG/DL (ref 6–20)
CALCIUM SERPL-MCNC: 9.1 MG/DL (ref 8.7–10.5)
CHLORIDE SERPL-SCNC: 103 MMOL/L (ref 95–110)
CLARITY UR REFRACT.AUTO: CLEAR
CO2 SERPL-SCNC: 25 MMOL/L (ref 23–29)
COLOR UR AUTO: YELLOW
CREAT SERPL-MCNC: 0.8 MG/DL (ref 0.5–1.4)
CTP QC/QA: YES
DIFFERENTIAL METHOD: ABNORMAL
EOSINOPHIL # BLD AUTO: 0.1 K/UL (ref 0–0.5)
EOSINOPHIL NFR BLD: 1.8 % (ref 0–8)
ERYTHROCYTE [DISTWIDTH] IN BLOOD BY AUTOMATED COUNT: 14.8 % (ref 11.5–14.5)
EST. GFR  (AFRICAN AMERICAN): >60 ML/MIN/1.73 M^2
EST. GFR  (NON AFRICAN AMERICAN): >60 ML/MIN/1.73 M^2
GLUCOSE SERPL-MCNC: 119 MG/DL (ref 70–110)
GLUCOSE UR QL STRIP: NEGATIVE
HCT VFR BLD AUTO: 37.3 % (ref 37–48.5)
HGB BLD-MCNC: 12.3 G/DL (ref 12–16)
HGB UR QL STRIP: NEGATIVE
IMM GRANULOCYTES # BLD AUTO: 0.01 K/UL (ref 0–0.04)
IMM GRANULOCYTES NFR BLD AUTO: 0.1 % (ref 0–0.5)
KETONES UR QL STRIP: NEGATIVE
LEUKOCYTE ESTERASE UR QL STRIP: NEGATIVE
LYMPHOCYTES # BLD AUTO: 2.4 K/UL (ref 1–4.8)
LYMPHOCYTES NFR BLD: 33.4 % (ref 18–48)
MCH RBC QN AUTO: 28.1 PG (ref 27–31)
MCHC RBC AUTO-ENTMCNC: 33 G/DL (ref 32–36)
MCV RBC AUTO: 85 FL (ref 82–98)
MONOCYTES # BLD AUTO: 0.5 K/UL (ref 0.3–1)
MONOCYTES NFR BLD: 6.9 % (ref 4–15)
NEUTROPHILS # BLD AUTO: 4.2 K/UL (ref 1.8–7.7)
NEUTROPHILS NFR BLD: 57.7 % (ref 38–73)
NITRITE UR QL STRIP: NEGATIVE
NRBC BLD-RTO: 0 /100 WBC
PH UR STRIP: 5 [PH] (ref 5–8)
PLATELET # BLD AUTO: 328 K/UL (ref 150–450)
PMV BLD AUTO: 8.5 FL (ref 9.2–12.9)
POTASSIUM SERPL-SCNC: 3.8 MMOL/L (ref 3.5–5.1)
PROT SERPL-MCNC: 6.8 G/DL (ref 6–8.4)
PROT UR QL STRIP: NEGATIVE
RBC # BLD AUTO: 4.37 M/UL (ref 4–5.4)
SARS-COV-2 RDRP RESP QL NAA+PROBE: NEGATIVE
SODIUM SERPL-SCNC: 137 MMOL/L (ref 136–145)
SP GR UR STRIP: 1.01 (ref 1–1.03)
TROPONIN I SERPL DL<=0.01 NG/ML-MCNC: <0.006 NG/ML (ref 0–0.03)
TROPONIN I SERPL DL<=0.01 NG/ML-MCNC: <0.006 NG/ML (ref 0–0.03)
URN SPEC COLLECT METH UR: NORMAL
WBC # BLD AUTO: 7.28 K/UL (ref 3.9–12.7)

## 2022-05-27 PROCEDURE — 85025 COMPLETE CBC W/AUTO DIFF WBC: CPT | Performed by: EMERGENCY MEDICINE

## 2022-05-27 PROCEDURE — 99284 PR EMERGENCY DEPT VISIT,LEVEL IV: ICD-10-PCS | Mod: CS,,, | Performed by: EMERGENCY MEDICINE

## 2022-05-27 PROCEDURE — 99284 EMERGENCY DEPT VISIT MOD MDM: CPT | Mod: CS,,, | Performed by: EMERGENCY MEDICINE

## 2022-05-27 PROCEDURE — U0002 COVID-19 LAB TEST NON-CDC: HCPCS | Performed by: PHYSICIAN ASSISTANT

## 2022-05-27 PROCEDURE — 99284 EMERGENCY DEPT VISIT MOD MDM: CPT | Mod: 25

## 2022-05-27 PROCEDURE — 81003 URINALYSIS AUTO W/O SCOPE: CPT | Performed by: PHYSICIAN ASSISTANT

## 2022-05-27 PROCEDURE — 80053 COMPREHEN METABOLIC PANEL: CPT | Performed by: EMERGENCY MEDICINE

## 2022-05-27 PROCEDURE — 25000003 PHARM REV CODE 250: Performed by: PHYSICIAN ASSISTANT

## 2022-05-27 PROCEDURE — 84484 ASSAY OF TROPONIN QUANT: CPT | Mod: 91 | Performed by: PHYSICIAN ASSISTANT

## 2022-05-27 PROCEDURE — 84484 ASSAY OF TROPONIN QUANT: CPT | Performed by: EMERGENCY MEDICINE

## 2022-05-27 RX ORDER — IBUPROFEN 600 MG/1
600 TABLET ORAL
Status: COMPLETED | OUTPATIENT
Start: 2022-05-27 | End: 2022-05-27

## 2022-05-27 RX ADMIN — IBUPROFEN 600 MG: 600 TABLET ORAL at 03:05

## 2022-05-27 NOTE — TELEPHONE ENCOUNTER
Chest pain , radiating pain to left arm and side  Also is having groin pain  Hx of htn, and abnormal ekg   suggested ER, as soon as possibe

## 2022-05-27 NOTE — TELEPHONE ENCOUNTER
----- Message from Mariam Melo sent at 5/27/2022  8:59 AM CDT -----  Contact: [phone]  Pt is calling to see if there is any cancellations for today she is having trouble with chest pains and left leg is hurting please advise and give return call  chest pains come and go

## 2022-05-27 NOTE — ED PROVIDER NOTES
Encounter Date: 5/27/2022       History     Chief Complaint   Patient presents with    Chest Pain     Starting yesterday, chest pain radiating to L arm. L leg pain, weakness, frontal HA. L sided groin pain     This is a 51 year old female with a PMH of HNT, back pain, depression presenting to the ED with a chief complaint of chest pain. She reports intermittent sharp left sided chest pain that radiates into the left arm which started yesterday. She also has constant pain in the left groin, headache, and generalized weakness. She has to take short steps with walking due to the pain. The longer she stands the pain intensifies. She reports chronic hx of palpitations and RODAS. She reports subjective fever, headache, body aches, congestion. She denies nausea, vomiting, diarrhea, focal weakness or numbness.         Review of patient's allergies indicates:   Allergen Reactions    Codeine Hallucinations     Past Medical History:   Diagnosis Date    Back pain     Depression     GERD (gastroesophageal reflux disease)     Hypertension     Miscarriage      Past Surgical History:   Procedure Laterality Date    breast augmentation      DILATION AND CURETTAGE OF UTERUS      heel spurs  11/2018    TOTAL REDUCTION MAMMOPLASTY Bilateral 1994     Family History   Problem Relation Age of Onset    Colon cancer Paternal Grandfather     Diabetes Mother     Heart disease Father     Aortic aneurysm Brother     Cerebral aneurysm Maternal Aunt     Diabetes Maternal Grandfather      Social History     Tobacco Use    Smoking status: Never Smoker    Smokeless tobacco: Never Used   Substance Use Topics    Alcohol use: Yes     Comment: once a month    Drug use: Yes     Types: Marijuana     Comment: once a month     Review of Systems   Constitutional: Positive for fatigue and fever. Negative for chills.   HENT: Positive for congestion. Negative for sore throat.    Respiratory: Negative for shortness of breath.    Cardiovascular:  Positive for chest pain.   Gastrointestinal: Negative for nausea and vomiting.   Genitourinary: Negative for dysuria.   Musculoskeletal: Positive for arthralgias, back pain and myalgias.   Skin: Negative for rash.   Neurological: Positive for headaches. Negative for weakness.   Hematological: Does not bruise/bleed easily.       Physical Exam     Initial Vitals [05/27/22 1325]   BP Pulse Resp Temp SpO2   126/74 104 18 98 °F (36.7 °C) 98 %      MAP       --         Physical Exam    Constitutional: She appears well-developed and well-nourished. She is Obese . No distress. Face mask in place.   HENT:   Head: Atraumatic.   Eyes: Conjunctivae and EOM are normal. Pupils are equal, round, and reactive to light.   Cardiovascular: Normal rate, regular rhythm and normal heart sounds.   Pulmonary/Chest: Breath sounds normal. No respiratory distress. She has no wheezes. She has no rhonchi. She has no rales.   Abdominal: Abdomen is soft. Bowel sounds are normal. There is no abdominal tenderness.   Musculoskeletal:      Left hip: Bony tenderness present. Normal range of motion. Normal strength.     Neurological: She is alert and oriented to person, place, and time.   Skin: Skin is warm and dry. No rash noted.         ED Course   Procedures  Labs Reviewed   CBC W/ AUTO DIFFERENTIAL - Abnormal; Notable for the following components:       Result Value    RDW 14.8 (*)     MPV 8.5 (*)     All other components within normal limits   COMPREHENSIVE METABOLIC PANEL - Abnormal; Notable for the following components:    Glucose 119 (*)     Albumin 3.3 (*)     All other components within normal limits   TROPONIN I   URINALYSIS, REFLEX TO URINE CULTURE    Narrative:     Specimen Source->Urine   TROPONIN I   BASIC METABOLIC PANEL   SARS-COV-2 RDRP GENE    Narrative:     This test utilizes isothermal nucleic acid amplification   technology to detect the SARS-CoV-2 RdRp nucleic acid segment.   The analytical sensitivity (limit of detection) is 125  genome   equivalents/mL.   A POSITIVE result implies infection with the SARS-CoV-2 virus;   the patient is presumed to be contagious.     A NEGATIVE result means that SARS-CoV-2 nucleic acids are not   present above the limit of detection. A NEGATIVE result should be   treated as presumptive. It does not rule out the possibility of   COVID-19 and should not be the sole basis for treatment decisions.   If COVID-19 is strongly suspected based on clinical and exposure   history, re-testing using an alternate molecular assay should be   considered.   This test is only for use under the Food and Drug   Administration s Emergency Use Authorization (EUA).   Commercial kits are provided by [employer].   Performance characteristics of the EUA have been independently   verified by [facility] Department of   Pathology and Laboratory Medicine.   _________________________________________________________________   The authorized Fact Sheet for Healthcare Providers and the authorized Fact   Sheet for Patients of the ID NOW COVID-19 are available on the FDA   website:     https://www.fda.gov/media/455327/download  https://www.fda.gov/media/240548/download                 Imaging Results          X-Ray Hip 2 or 3 views Left (with Pelvis when performed) (Final result)  Result time 05/27/22 15:11:12    Final result by Samir Conway MD (05/27/22 15:11:12)                 Impression:      See above      Electronically signed by: Samir Conway MD  Date:    05/27/2022  Time:    15:11             Narrative:    EXAMINATION:  XR HIP WITH PELVIS WHEN PERFORMED, 2 OR 3 VIEWS LEFT    CLINICAL HISTORY:  Pain in unspecified hip    TECHNIQUE:  AP view of the pelvis and frog leg lateral view of the left hip were performed.    COMPARISON:  None    FINDINGS:  Bones of the hips and pelvis are intact with mild degenerative change.  No other significant findings.                               X-Ray Chest PA And Lateral  (Final result)  Result time 05/27/22 15:10:29    Final result by Samir Conway MD (05/27/22 15:10:29)                 Impression:      Normal chest      Electronically signed by: Samir Conway MD  Date:    05/27/2022  Time:    15:10             Narrative:    EXAMINATION:  XR CHEST PA AND LATERAL    CLINICAL HISTORY:  Chest pain, unspecified    TECHNIQUE:  PA and lateral views of the chest were performed.    COMPARISON:  08/26/2021    FINDINGS:  Cardiac size is normal and lungs are clear.  No infiltrate is seen.                                 Medications   ibuprofen tablet 600 mg (600 mg Oral Given 5/27/22 1504)     Medical Decision Making:   Clinical Tests:   Lab Tests: Ordered and Reviewed  Radiological Study: Ordered and Reviewed  Medical Tests: Ordered and Reviewed       APC / Resident Notes:   51 y.o. year old female presenting with chest pain, left hip pain, URI symptoms.    DDx includes but is not limited to viral syndrome, pneumonia, ACS, inflammatory arthropathy.    ED workup reveals stable labs and negative troponin x 2.  Urinalysis benign.  Xray left hip with mild DJD.  CXR is normal.    Plan  Given Ibuprofen in ED with significant symptomatic improvement.  Advised supportive care, PCP f/u and ortho f/u for hip pain    Discussed findings and plan with patient who verbalized understanding and agrees with the plan and course of treatment. Return to ED precautions discussed. Patient is stable for discharge. I discussed the care of this patient with my supervising physician.         Attending Attestation:     Physician Attestation Statement for NP/PA:   I have conducted a face to face encounter with this patient in addition to the NP/PA, due to Medical Complexity    Other NP/PA Attestation Additions:    History of Present Illness: 50 yo female who has a constellation of symptoms that have been ongoing now since yesterday that include: diffuse headache (not worst of life or thunderclap in nature/no  neck stiffness); congestion; subjective fever; left sided chest pain that radiates into the left arm (no pleuritic nature); and left pain in her inguinal region / groin. She has no gu complaints thought - no urgency, frequency, hesitancy and no vaginal discharge. She does have known htn. This constellation of symptoms has been present since yesterday. No documented fever. No vomiting. No cough. No diarrhea. Has chronic problems with palpitations.    Physical Exam: VS upon arrival: 118/74; 96; 18; 99% RA; AF.   Consititutional: Pt is awake, alert, and oriented x 4. Does not appear to be in any lynn distress.  HEENT: PERRL; EOMI; nares patent; op clear; mmm without lesions.  Neck: Supple with good ROM.  CV: Normal rate; regular rhythm; no mrg. Heart sounds normal. No peripheral edema. 2+ radials bilateral and symmetric.  Respiratory: CTA bilaterally with no focal rales, ronchi, or wheezes.  GI: Abdomen soft, NTND. No rebound. No guarding. BS normal. Completely benign abdominal exam.   MSK: No long bone deformities; no focal joint swellings.  Neuro: MAEW.   Skin: No skin lesions or rash.    Medical Decision Making: The patient had a very reassuring workup.  We were working to exclude causes that were primarily diffuse and consistent with her multiple symptoms such as acute viral syndrome, in particular acute COVID.  In addition, we did want to assure that we excluded cardiac ischemia given her symptoms in the chest that radiated to the left arm although this was of low likelihood.  We also wanted to assure that she had no other focal area of infection in her chest or her urine that could lead to these multiple symptoms that sounded infectious in nature.  Her COVID test was negative.  Her chest x-ray, independently reviewed and interpreted by me and interpreted by radiology, revealed no acute fracture, infiltrate, effusion, or pneumothorax.  Her white count was normal.  Her electrolytes also were normal of note other  than a mildly elevated glucose of 119. Her EKG, independently reviewed and interpreted by me, reveals sinus rhythm with no acute ST or T-wave changes concerning for ischemia or infarction.  Her troponin was normal as well.  Her urinalysis was negative for infection.  We also performed plain films of the left hip given the discomfort that she had been having.  She related the pain in her groin although she had a benign abdominal exam.  But she also stated that it was positional with movements of the hip so we wanted to assure that we excluded any acute bony abnormality.  These films also independently reviewed and interpreted by me and interpreted by Radiology did not find any evidence of acute fracture or malalignment.  Thus, the patient's workup has been globally reassuring.  I do feel that she has evidence of a viral syndrome given her constellation of symptoms.  We are going to give her supportive care measures and recommendations.  She will be discharged in stable condition.  ED diagnosis:  1. Acute viral syndrome.  2. Acute chest pain.  3. Acute left groin pain.                      Clinical Impression:   Final diagnoses:  [R07.9] Chest pain (Primary)  [M25.559] Hip pain          ED Disposition Condition    Discharge Stable        ED Prescriptions     None        Follow-up Information     Follow up With Specialties Details Why Contact Info    Angel Bolaños MD Internal Medicine Schedule an appointment as soon as possible for a visit   [address] [facility] [address]  [phone]             Iris Faith PA-C  05/27/22 1717       Chantel Metcalf MD  05/28/22 1543       Chantel Metcalf MD  05/28/22 1543

## 2022-08-02 ENCOUNTER — HOSPITAL ENCOUNTER (EMERGENCY)
Facility: HOSPITAL | Age: 52
Discharge: HOME | End: 2022-08-03
Attending: EMERGENCY MEDICINE
Payer: COMMERCIAL

## 2022-08-02 VITALS
SYSTOLIC BLOOD PRESSURE: 118 MMHG | TEMPERATURE: 98 F | OXYGEN SATURATION: 99 % | DIASTOLIC BLOOD PRESSURE: 68 MMHG | HEART RATE: 76 BPM | RESPIRATION RATE: 18 BRPM

## 2022-08-02 DIAGNOSIS — R07.89 CHEST TIGHTNESS: ICD-10-CM

## 2022-08-02 DIAGNOSIS — R10.32 LEFT LOWER QUADRANT ABDOMINAL PAIN: Primary | ICD-10-CM

## 2022-08-02 LAB
ALBUMIN SERPL BCP-MCNC: 4.2 G/DL (ref 3.5–5.2)
ALP SERPL-CCNC: 97 U/L (ref 55–135)
ALT SERPL W/O P-5'-P-CCNC: 28 U/L (ref 10–44)
ANION GAP SERPL CALC-SCNC: 11 MMOL/L (ref 8–16)
AST SERPL-CCNC: 26 U/L (ref 10–40)
BASOPHILS # BLD AUTO: 0.04 K/UL (ref 0–0.2)
BASOPHILS NFR BLD: 0.4 % (ref 0–1.9)
BILIRUB SERPL-MCNC: 0.2 MG/DL (ref 0.1–1)
BILIRUB UR QL STRIP: NEGATIVE
BUN SERPL-MCNC: 24 MG/DL (ref 6–20)
BUN SERPL-MCNC: 29 MG/DL (ref 6–30)
CALCIUM SERPL-MCNC: 9.9 MG/DL (ref 8.7–10.5)
CHLORIDE SERPL-SCNC: 103 MMOL/L (ref 95–110)
CHLORIDE SERPL-SCNC: 103 MMOL/L (ref 95–110)
CLARITY UR REFRACT.AUTO: CLEAR
CO2 SERPL-SCNC: 25 MMOL/L (ref 23–29)
COLOR UR AUTO: YELLOW
CREAT SERPL-MCNC: 1 MG/DL (ref 0.5–1.4)
CREAT SERPL-MCNC: 1.1 MG/DL (ref 0.5–1.4)
DIFFERENTIAL METHOD: ABNORMAL
EOSINOPHIL # BLD AUTO: 0.2 K/UL (ref 0–0.5)
EOSINOPHIL NFR BLD: 1.9 % (ref 0–8)
ERYTHROCYTE [DISTWIDTH] IN BLOOD BY AUTOMATED COUNT: 15.1 % (ref 11.5–14.5)
EST. GFR  (NO RACE VARIABLE): >60 ML/MIN/1.73 M^2
GLUCOSE SERPL-MCNC: 101 MG/DL (ref 70–110)
GLUCOSE SERPL-MCNC: 106 MG/DL (ref 70–110)
GLUCOSE UR QL STRIP: NEGATIVE
HCT VFR BLD AUTO: 38.6 % (ref 37–48.5)
HCT VFR BLD CALC: 42 %PCV (ref 36–54)
HGB BLD-MCNC: 12.7 G/DL (ref 12–16)
HGB UR QL STRIP: NEGATIVE
IMM GRANULOCYTES # BLD AUTO: 0.04 K/UL (ref 0–0.04)
IMM GRANULOCYTES NFR BLD AUTO: 0.4 % (ref 0–0.5)
KETONES UR QL STRIP: NEGATIVE
LEUKOCYTE ESTERASE UR QL STRIP: NEGATIVE
LIPASE SERPL-CCNC: 27 U/L (ref 4–60)
LYMPHOCYTES # BLD AUTO: 2.5 K/UL (ref 1–4.8)
LYMPHOCYTES NFR BLD: 24.5 % (ref 18–48)
MCH RBC QN AUTO: 28.5 PG (ref 27–31)
MCHC RBC AUTO-ENTMCNC: 32.9 G/DL (ref 32–36)
MCV RBC AUTO: 87 FL (ref 82–98)
MONOCYTES # BLD AUTO: 0.7 K/UL (ref 0.3–1)
MONOCYTES NFR BLD: 6.9 % (ref 4–15)
NEUTROPHILS # BLD AUTO: 6.8 K/UL (ref 1.8–7.7)
NEUTROPHILS NFR BLD: 65.9 % (ref 38–73)
NITRITE UR QL STRIP: NEGATIVE
NRBC BLD-RTO: 0 /100 WBC
PH UR STRIP: 5 [PH] (ref 5–8)
PLATELET # BLD AUTO: 371 K/UL (ref 150–450)
PMV BLD AUTO: 8.4 FL (ref 9.2–12.9)
POC IONIZED CALCIUM: 1.14 MMOL/L (ref 1.06–1.42)
POC TCO2 (MEASURED): 28 MMOL/L (ref 23–29)
POTASSIUM BLD-SCNC: 4 MMOL/L (ref 3.5–5.1)
POTASSIUM SERPL-SCNC: 4 MMOL/L (ref 3.5–5.1)
PROT SERPL-MCNC: 8.3 G/DL (ref 6–8.4)
PROT UR QL STRIP: NEGATIVE
RBC # BLD AUTO: 4.45 M/UL (ref 4–5.4)
SAMPLE: NORMAL
SODIUM BLD-SCNC: 140 MMOL/L (ref 136–145)
SODIUM SERPL-SCNC: 139 MMOL/L (ref 136–145)
SP GR UR STRIP: 1.02 (ref 1–1.03)
TROPONIN I SERPL DL<=0.01 NG/ML-MCNC: <0.006 NG/ML (ref 0–0.03)
URN SPEC COLLECT METH UR: NORMAL
WBC # BLD AUTO: 10.34 K/UL (ref 3.9–12.7)

## 2022-08-02 PROCEDURE — 81003 URINALYSIS AUTO W/O SCOPE: CPT | Performed by: NURSE PRACTITIONER

## 2022-08-02 PROCEDURE — 83690 ASSAY OF LIPASE: CPT | Performed by: NURSE PRACTITIONER

## 2022-08-02 PROCEDURE — 84484 ASSAY OF TROPONIN QUANT: CPT | Performed by: NURSE PRACTITIONER

## 2022-08-02 PROCEDURE — 96374 THER/PROPH/DIAG INJ IV PUSH: CPT | Mod: 59

## 2022-08-02 PROCEDURE — 63600175 PHARM REV CODE 636 W HCPCS: Performed by: EMERGENCY MEDICINE

## 2022-08-02 PROCEDURE — 93010 EKG 12-LEAD: ICD-10-PCS | Mod: ,,, | Performed by: INTERNAL MEDICINE

## 2022-08-02 PROCEDURE — 96361 HYDRATE IV INFUSION ADD-ON: CPT

## 2022-08-02 PROCEDURE — 99285 EMERGENCY DEPT VISIT HI MDM: CPT | Mod: 25

## 2022-08-02 PROCEDURE — 99284 EMERGENCY DEPT VISIT MOD MDM: CPT | Mod: ,,, | Performed by: EMERGENCY MEDICINE

## 2022-08-02 PROCEDURE — 93010 ELECTROCARDIOGRAM REPORT: CPT | Mod: ,,, | Performed by: INTERNAL MEDICINE

## 2022-08-02 PROCEDURE — 80053 COMPREHEN METABOLIC PANEL: CPT | Performed by: NURSE PRACTITIONER

## 2022-08-02 PROCEDURE — 85025 COMPLETE CBC W/AUTO DIFF WBC: CPT | Performed by: NURSE PRACTITIONER

## 2022-08-02 PROCEDURE — 25500020 PHARM REV CODE 255: Performed by: EMERGENCY MEDICINE

## 2022-08-02 PROCEDURE — 93005 ELECTROCARDIOGRAM TRACING: CPT

## 2022-08-02 PROCEDURE — 99284 PR EMERGENCY DEPT VISIT,LEVEL IV: ICD-10-PCS | Mod: ,,, | Performed by: EMERGENCY MEDICINE

## 2022-08-02 RX ORDER — KETOROLAC TROMETHAMINE 30 MG/ML
15 INJECTION, SOLUTION INTRAMUSCULAR; INTRAVENOUS
Status: COMPLETED | OUTPATIENT
Start: 2022-08-02 | End: 2022-08-02

## 2022-08-02 RX ADMIN — SODIUM CHLORIDE, SODIUM LACTATE, POTASSIUM CHLORIDE, AND CALCIUM CHLORIDE 1000 ML: .6; .31; .03; .02 INJECTION, SOLUTION INTRAVENOUS at 11:08

## 2022-08-02 RX ADMIN — KETOROLAC TROMETHAMINE 15 MG: 30 INJECTION, SOLUTION INTRAMUSCULAR; INTRAVENOUS at 11:08

## 2022-08-02 RX ADMIN — IOHEXOL 100 ML: 350 INJECTION, SOLUTION INTRAVENOUS at 10:08

## 2022-08-02 NOTE — Clinical Note
"Araceli Batresine" Claude was seen and treated in our emergency department on 8/2/2022.  She may return to work on 08/05/2022.       If you have any questions or concerns, please don't hesitate to call.      Timoteo Harris MD"

## 2022-08-03 ENCOUNTER — PATIENT MESSAGE (OUTPATIENT)
Dept: BARIATRICS | Facility: CLINIC | Age: 52
End: 2022-08-03
Payer: COMMERCIAL

## 2022-08-03 RX ORDER — HYOSCYAMINE SULFATE 0.12 MG/1
0.12 TABLET SUBLINGUAL EVERY 4 HOURS PRN
Qty: 21 TABLET | Refills: 0 | Status: SHIPPED | OUTPATIENT
Start: 2022-08-03 | End: 2022-11-03

## 2022-08-03 NOTE — FIRST PROVIDER EVALUATION
Emergency Department TeleTriage Encounter Note      CHIEF COMPLAINT    Chief Complaint   Patient presents with    Abdominal Pain     Pt reports lower abdominal and lower back pain since last night. PMH diverticulitis. Denies n/v/d. Denies blood in stool. Reports chest tightness. Denies SOB.       VITAL SIGNS   Initial Vitals [08/02/22 2101]   BP Pulse Resp Temp SpO2   113/69 98 20 98 °F (36.7 °C) 98 %      MAP       --            ALLERGIES    Review of patient's allergies indicates:   Allergen Reactions    Codeine Hallucinations       PROVIDER TRIAGE NOTE  This is a teletriage evaluation of a 52 y.o. female presenting to the ED with c/o lower abdominal pain since yesterday, worse today.  Pt states she has history of diverticulitis and pain is similar. Pt denies any rectal bleeding.      PE: VSS.  NAD noted.  Speaking in full sentences    Plan: labs    Limited physical exam via telehealth: The patient is awake, alert, answering questions appropriately and is not in respiratory distress. All ED beds are full at present; patient notified of this status.  Patient seen and medically screened by Nurse Practitioner via teletriage.      Initial orders will be placed and care will be transferred to an alternate provider when patient is roomed for a full evaluation. Any additional orders and the final disposition will be determined by that provider.         ORDERS  Labs Reviewed   CBC W/ AUTO DIFFERENTIAL   COMPREHENSIVE METABOLIC PANEL   LIPASE   URINALYSIS, REFLEX TO URINE CULTURE   ISTAT CHEM8       ED Orders (720h ago, onward)    Start Ordered     Status Ordering Provider    08/02/22 2118 08/02/22 2117  Vital signs  Every 2 hours         Ordered ANKITA GRIMES    08/02/22 2118 08/02/22 2117  Diet NPO  Diet effective now         Ordered ANKITA GRIMES    08/02/22 2118 08/02/22 2117  Insert peripheral IV  Once         Ordered ANKITA GRIMES    08/02/22 2118 08/02/22 2117  CBC W/ AUTO DIFFERENTIAL  STAT          Ordered ANKITA GRIMES.    08/02/22 2118 08/02/22 2117  Comp. Metabolic Panel  STAT         Ordered ANKITA GRIMES.    08/02/22 2118 08/02/22 2117  ISTAT CHEM8  Once         Ordered ANKITA GRIMES.    08/02/22 2118 08/02/22 2117  Lipase  STAT         Ordered ANKITA GRIMES.    08/02/22 2118 08/02/22 2117  Urinalysis, Reflex to Urine Culture Urine, Clean Catch  STAT         Ordered ANKITA GRIMES.    08/02/22 2104 08/02/22 2103  EKG 12-lead  Once         Completed by BULMARO EDMONDS on 8/2/2022 at  9:11 PM JAQUELIN WIN JR            Virtual Visit Note: The provider triage portion of this emergency department evaluation and documentation was performed via [employer], a HIPAA-compliant telemedicine application, in concert with a tele-presenter in the room. A face to face patient evaluation with one of my colleagues will occur once the patient is placed in an emergency department room.      DISCLAIMER: This note was prepared with [employer] voice recognition transcription software. Garbled syntax, mangled pronouns, and other bizarre constructions may be attributed to that software system.

## 2022-08-03 NOTE — ED PROVIDER NOTES
Encounter Date: 8/2/2022    SCRIBE #1 NOTE: I, Raquel Fowler, am scribing for, and in the presence of,  Timoteo Harris MD. I have scribed the following portions of the note - Other sections scribed: HPI, ROS.       History     Chief Complaint   Patient presents with    Abdominal Pain     Pt reports lower abdominal and lower back pain since last night. PMH diverticulitis. Denies n/v/d. Denies blood in stool. Reports chest tightness. Denies SOB.     Time patient was seen by the provider: 10:18 PM      The patient is a 52 y.o. female with co-morbidities including: HTN, GERD, divercitulitis who presents to the ED with a complaint of lower abdominal pain with onset 2 days ago. Yesterday she thought she had a bladder infection because she was having pressure with urination. She also reports mouth dryness, decreased urination, upper back pain, and a poor diet. She has had some diarrhea but feels like something is blocked. Her last colonoscopy was years ago. Her last diverticulitis flare up was in 2018. Her abdominal pain worsens when she lifts something. She denies any fever, chills, nausea, vomiting, SOB, or history of abdominal surgeries.     The history is provided by the patient and medical records. No  was used.     Review of patient's allergies indicates:   Allergen Reactions    Codeine Hallucinations     Past Medical History:   Diagnosis Date    Back pain     Depression     GERD (gastroesophageal reflux disease)     Hypertension     Miscarriage      Past Surgical History:   Procedure Laterality Date    breast augmentation      DILATION AND CURETTAGE OF UTERUS      heel spurs  11/2018    TOTAL REDUCTION MAMMOPLASTY Bilateral 1994     Family History   Problem Relation Age of Onset    Colon cancer Paternal Grandfather     Diabetes Mother     Heart disease Father     Aortic aneurysm Brother     Cerebral aneurysm Maternal Aunt     Diabetes Maternal Grandfather      Social  History     Tobacco Use    Smoking status: Never Smoker    Smokeless tobacco: Never Used   Substance Use Topics    Alcohol use: Yes     Comment: once a month    Drug use: Yes     Types: Marijuana     Comment: once a month     Review of Systems   Constitutional: Negative for chills and fever.   HENT: Negative for sore throat.         + Dry mouth   Respiratory: Negative for shortness of breath.    Cardiovascular: Negative for chest pain.   Gastrointestinal: Positive for abdominal pain and diarrhea. Negative for nausea and vomiting.   Genitourinary: Positive for decreased urine volume.        + Pressure with urination.   Musculoskeletal: Positive for back pain.   Skin: Negative for rash.   Neurological: Negative for weakness.   Hematological: Does not bruise/bleed easily.       Physical Exam     Initial Vitals [08/02/22 2101]   BP Pulse Resp Temp SpO2   113/69 98 20 98 °F (36.7 °C) 98 %      MAP       --         Physical Exam    Vitals reviewed.  Constitutional:   52-year-old  woman, no acute distress noted   HENT:   Head: Normocephalic and atraumatic.   Mildly desiccated mucous membranes noted without additional evidence of intraoral injury   Eyes: EOM are normal. Pupils are equal, round, and reactive to light.   Neck: No tracheal deviation present.   Cardiovascular: Normal rate and regular rhythm.   Pulmonary/Chest: Breath sounds normal. No stridor. No respiratory distress.   Abdominal:   Obese, soft, mild bilateral lower abdominal tenderness to palpation without mass or rebound   Musculoskeletal:         General: No edema. Normal range of motion.     Neurological: She is alert and oriented to person, place, and time. GCS score is 15. GCS eye subscore is 4. GCS verbal subscore is 5. GCS motor subscore is 6.   Skin: Skin is warm and dry.   Psychiatric: Her behavior is normal. Thought content normal.         ED Course   Procedures  Labs Reviewed   CBC W/ AUTO DIFFERENTIAL - Abnormal; Notable for the  following components:       Result Value    RDW 15.1 (*)     MPV 8.4 (*)     All other components within normal limits   COMPREHENSIVE METABOLIC PANEL - Abnormal; Notable for the following components:    BUN 24 (*)     All other components within normal limits   LIPASE   URINALYSIS, REFLEX TO URINE CULTURE    Narrative:     Specimen Source->Urine   TROPONIN I   TROPONIN I    Narrative:     Add on Trop per Dr. Harris @ 22:26 pm to order # [id number]   ISTAT PROCEDURE   ISTAT CHEM8     EKG Readings: (Independently Interpreted)   Initial Reading: No STEMI. Rhythm: Sinus Tachycardia. Heart Rate: 102. ST Segments: Normal ST Segments. Axis: Normal.       Imaging Results          CT Abdomen Pelvis With Contrast (Final result)  Result time 08/02/22 23:33:23    Final result by Philip Giordano MD (08/02/22 23:33:23)                 Impression:      1. Hepatomegaly and hepatic steatosis.  2. Colonic diverticulosis without inflammation to suggest acute diverticulitis.  3. Stable grade 1 anterolisthesis of L4 on L5.    Electronically signed by resident: Kristen Harrell  Date:    08/02/2022  Time:    23:10    Electronically signed by: Philip Giordano  Date:    08/02/2022  Time:    23:33             Narrative:    EXAMINATION:  CT ABDOMEN PELVIS WITH CONTRAST    CLINICAL HISTORY:  LLQ abdominal pain;    TECHNIQUE:  Routine axial CT images of the abdomen were obtained after administration 100cc of IV Omnipaque 350.  Coronal and Sagittal reformatted images were also obtained.    COMPARISON:  CT renal stone study 08/14/2017.    FINDINGS:  Heart: Normal in size with no pericardial effusion.    Lungs: Both lung bases are well aerated with no consolidation or pleural effusion.    Liver: The liver is enlarged measuring 18.9 cm in the craniocaudal dimension.  Diffuse hypoattenuation of the hepatic parenchyma suggestive of fatty infiltration.  With no focal hepatic lesions.    Gallbladder/biliary system: No calcified gallstones.  No  intrahepatic or extrahepatic ductal dilatation.    Pancreas: No mass or peripancreatic fat stranding.    Spleen: Unremarkable.    GI Tract/ Mesentery: The stomach is unremarkable.  No evidence of bowel obstruction or inflammation. The appendix is unremarkable.Scattered colonic diverticulosis without adjacent inflammatory changes to suggest acute diverticulitis.  Multiple prominent mesenteric lymph nodes not meeting size criteria for enlargement similar to prior.    Adrenals: Unremarkable.    Kidneys/ Ureters: Normal in size and location. No hydronephrosis or nephrolithiasis. Both ureters are normal in course caliber with no ureteral stones or hydroureter.    Bladder: Normal in contour with no bladder wall thickening.    Reproductive organs: Unremarkable.    Retroperitoneum: Multiple prominent pericaval and periaortic lymph nodes not meeting size criteria for enlargement, similar to prior.    Peritoneal space: No ascites. No free air.    Abdominal wall: Mild body wall edema most prominent along the lower abdomen.  Tiny fat containing umbilical hernia.    Vasculature: No calcific atherosclerosis of the abdominal aorta.  No evidence of aneurysmal dilatation.  The celiac artery, SMA, SHERIE and bilateral renal arteries are patent.  The splenic vein, main portal vein, and SMV are patent.    Bones: Degenerative disease of the spine with no acute fractures or lytic lesions.There is grade 1 anterolisthesis of L4 on L5, unchanged from prior.                                 Medications   lactated ringers bolus 1,000 mL (1,000 mLs Intravenous New Bag 8/2/22 2324)   ketorolac injection 15 mg (15 mg Intravenous Given 8/2/22 2330)   iohexoL (OMNIPAQUE 350) injection 100 mL (100 mLs Intravenous Given 8/2/22 2237)     Medical Decision Making:   History:   Old Medical Records: I decided to obtain old medical records.  Differential Diagnosis:   Constipation, abdominal cramping, acute diverticulitis, UTI, lethal arrhythmia  Independently  Interpreted Test(s):   I have ordered and independently interpreted EKG Reading(s) - see prior notes  Clinical Tests:   Lab Tests: Ordered and Reviewed  Radiological Study: Ordered and Reviewed  Medical Tests: Ordered and Reviewed          Scribe Attestation:   Scribe #1: I performed the above scribed service and the documentation accurately describes the services I performed. I attest to the accuracy of the note.    Attending Attestation:             Attending ED Notes:   Laboratory evaluation does not reveal evidence of significant hematologic or metabolic derangements.  There is mild volume contraction with an elevated BUN without additional evidence of solid organ dysfunction.  CT scan of the abdomen pelvis obtained today does not reveal evidence of acute infectious process, though does identify the presence of diverticular disease without acute inflammation.  She will be discharged home in stable condition with a prescription for Levsin to be taken as needed for recurrent abdominal discomfort with instructions to follow-up with her managing primary care physician and return to the emergency department as needed for emergent concerns.               Clinical Impression:   Final diagnoses:  [R07.89] Chest tightness  [R10.32] Left lower quadrant abdominal pain (Primary)          ED Disposition Condition    Discharge Stable        ED Prescriptions     Medication Sig Dispense Start Date End Date Auth. Provider    hyoscyamine (LEVSIN/SL) 0.125 mg Subl Place 1 tablet (0.125 mg total) under the tongue every 4 (four) hours as needed (Abdominal cramping). 21 tablet 8/3/2022  Timoteo Harris MD        Follow-up Information     Follow up With Specialties Details Why Contact Info    Angel Bolaños MD Internal Medicine Schedule an appointment as soon as possible for a visit in 1 week  [address]  [facility] [address]  [phone]      [facility] - Emergency Dept Emergency Medicine  As needed, If symptoms worsen  [address]  [facility] [address]  [phone]           Timoteo Harris MD  08/04/22 4023

## 2022-08-03 NOTE — ED TRIAGE NOTES
Pt. c a history of diverticulitis who is currently having a flair up.  Pt. Endorses pain to the lower back and lower abdominal area.  No other s/s or complaints.  Pt. Reports that she took tylenol and aleve pta.    APPEARANCE: No acute distress.    NEURO: Awake, alert, appropriate for age  HEENT: Head symmetrical. No obvious deformity  RESPIRATORY: Airway is open and patent. Respirations are spontaneous on room air.   NEUROVASCULAR: All extremities are warm and pink with capillary refill less than 3 seconds.   MUSCULOSKELETAL: Moves all extremities, wiggling toes and moving hands.   SKIN: Warm and dry, adequate turgor, mucus membranes moist and pink    Will continue to monitor.

## 2022-08-04 ENCOUNTER — OFFICE VISIT (OUTPATIENT)
Dept: INTERNAL MEDICINE | Facility: CLINIC | Age: 52
End: 2022-08-04
Payer: COMMERCIAL

## 2022-08-04 VITALS
HEART RATE: 96 BPM | SYSTOLIC BLOOD PRESSURE: 124 MMHG | OXYGEN SATURATION: 99 % | HEIGHT: 64 IN | BODY MASS INDEX: 39.03 KG/M2 | WEIGHT: 228.63 LBS | DIASTOLIC BLOOD PRESSURE: 74 MMHG

## 2022-08-04 DIAGNOSIS — F32.A DEPRESSION, UNSPECIFIED DEPRESSION TYPE: ICD-10-CM

## 2022-08-04 DIAGNOSIS — I10 HYPERTENSION, UNSPECIFIED TYPE: ICD-10-CM

## 2022-08-04 DIAGNOSIS — K21.9 GASTROESOPHAGEAL REFLUX DISEASE, UNSPECIFIED WHETHER ESOPHAGITIS PRESENT: ICD-10-CM

## 2022-08-04 PROCEDURE — 99214 OFFICE O/P EST MOD 30 MIN: CPT | Mod: S$GLB,,, | Performed by: FAMILY MEDICINE

## 2022-08-04 PROCEDURE — 99214 PR OFFICE/OUTPT VISIT, EST, LEVL IV, 30-39 MIN: ICD-10-PCS | Mod: S$GLB,,, | Performed by: FAMILY MEDICINE

## 2022-08-04 PROCEDURE — 99999 PR PBB SHADOW E&M-EST. PATIENT-LVL V: ICD-10-PCS | Mod: PBBFAC,,, | Performed by: FAMILY MEDICINE

## 2022-08-04 PROCEDURE — 99999 PR PBB SHADOW E&M-EST. PATIENT-LVL V: CPT | Mod: PBBFAC,,, | Performed by: FAMILY MEDICINE

## 2022-08-04 RX ORDER — OLMESARTAN MEDOXOMIL AND HYDROCHLOROTHIAZIDE 40/25 40; 25 MG/1; MG/1
1 TABLET ORAL DAILY
Qty: 90 TABLET | Refills: 0 | Status: SHIPPED | OUTPATIENT
Start: 2022-08-04 | End: 2022-11-03 | Stop reason: SDUPTHER

## 2022-08-04 RX ORDER — PANTOPRAZOLE SODIUM 40 MG/1
40 TABLET, DELAYED RELEASE ORAL DAILY
Qty: 30 TABLET | Refills: 2 | Status: SHIPPED | OUTPATIENT
Start: 2022-08-04 | End: 2022-11-03 | Stop reason: SDUPTHER

## 2022-08-04 RX ORDER — BUPROPION HYDROCHLORIDE 150 MG/1
150 TABLET, EXTENDED RELEASE ORAL 2 TIMES DAILY
Qty: 60 TABLET | Refills: 2 | Status: SHIPPED | OUTPATIENT
Start: 2022-08-04 | End: 2023-07-12

## 2022-08-04 NOTE — PROGRESS NOTES
Subjective:       Patient ID: Nadine M Claude is a 52 y.o. female.    Chief Complaint: Abdominal Pain    HPI    Nadine M Claude is a 52 y.o. female for prob focused visit.     ED visit 8/2/22. abd pain. Ct performed - hepatic steatosis, diverticulosis. Dc w/ levsin.    Pt describes midepigastric pain that is new for her over past 4-5 days. Yesterday got bad again after drinking coconut water and eating croissant. Took pepto bismol with relief. Restarted taking her protonix at home.CT reviewed.     Review of Systems   Constitutional: Negative for chills and fever.   Respiratory: Negative for shortness of breath.    Cardiovascular: Negative for chest pain.   Gastrointestinal: Positive for abdominal pain.         Past Medical History:   Diagnosis Date    Back pain     Depression     GERD (gastroesophageal reflux disease)     Hypertension     Miscarriage         Prior to Admission medications    Medication Sig Start Date End Date Taking? Authorizing Provider   buPROPion (WELLBUTRIN SR) 150 MG TBSR 12 hr tablet Take 1 tablet (150 mg total) by mouth 2 (two) times daily. 10/11/21 10/11/22  MD mariano Mortonalbital-acetaminophen-caff -40 mg -40 mg Cap Take 1 capsule by mouth 3 (three) times daily as needed (for headache not to exceed 6 capsules per day). 4/27/22   MD mariano Carlosalbital-acetaminophen-caffeine -40 mg (FIORICET, ESGIC) -40 mg per tablet Take 1 tablet by mouth every 6 (six) hours as needed for Pain. 1/19/21   Ankush Hickey MD   diclofenac sodium (VOLTAREN) 1 % Gel Apply 2 g topically 4 (four) times daily as needed. 8/17/20   Angel Bolaños MD   fenoprofen (NAPROFEN) 600 mg Tab  5/14/21   Historical Provider   furosemide (LASIX) 20 MG tablet Take 1 tablet (20 mg total) by mouth daily as needed (leg swelling). 7/23/21 7/23/22  Jan Lubin MD PhD   hyoscyamine (LEVSIN/SL) 0.125 mg Subl Place 1 tablet (0.125 mg total) under the tongue every 4 (four) hours as  "needed (Abdominal cramping). 8/3/22   Timoteo Harris MD   JUBLIA 10 % Nai Apply 1 application topically once daily. 5/11/21   Historical Provider   levalbuterol (XOPENEX) 1.25 mg/3 mL nebulizer solution Take 3 mLs (1.25 mg total) by nebulization every 6 (six) hours as needed for Shortness of Breath. Rescue 8/26/21 8/26/22  Julisa Da Silva NP   olmesartan-hydrochlorothiazide (BENICAR HCT) 40-25 mg per tablet Take 1 tablet by mouth once daily. 10/11/21 10/11/22  Angel Bolaños MD   pantoprazole (PROTONIX) 40 MG tablet Take 1 tablet (40 mg total) by mouth once daily. 10/11/21 10/11/22  Angel Bolaños MD   prochlorperazine (COMPAZINE) 10 MG tablet Take 1 tablet (10 mg total) by mouth every 6 (six) hours as needed (headache). 4/27/22   Anitra Mathias MD   zolpidem (AMBIEN) 5 MG Tab Take 1 tablet (5 mg total) by mouth nightly as needed. 10/11/21 11/10/21  Angel Bolaños MD        Past medical history, surgical history, and family medical history reviewed and updated as appropriate.    Medications and allergies reviewed.     Objective:          Vitals:    08/04/22 1317   BP: 124/74   BP Location: Left arm   Patient Position: Sitting   BP Method: Large (Manual)   Pulse: 96   SpO2: 99%   Weight: 103.7 kg (228 lb 9.9 oz)   Height: 5' 4" (1.626 m)     Body mass index is 39.24 kg/m².  Physical Exam  Vitals and nursing note reviewed.   Constitutional:       General: She is not in acute distress.     Appearance: She is not ill-appearing, toxic-appearing or diaphoretic.   Pulmonary:      Effort: Pulmonary effort is normal. No respiratory distress.   Neurological:      Mental Status: She is alert and oriented to person, place, and time.   Psychiatric:         Mood and Affect: Mood normal.         Behavior: Behavior normal.         Thought Content: Thought content normal.         Lab Results   Component Value Date    WBC 10.34 08/02/2022    HGB 12.7 08/02/2022    HCT 42 08/02/2022     08/02/2022    CHOL 161 " 02/23/2021    TRIG 128 02/23/2021    HDL 44 02/23/2021    ALT 28 08/02/2022    AST 26 08/02/2022     08/02/2022    K 4.0 08/02/2022     08/02/2022    CREATININE 1.0 08/02/2022    BUN 24 (H) 08/02/2022    CO2 25 08/02/2022    TSH 2.474 08/26/2021    INR 0.9 01/19/2021       Assessment:       1. Gastroesophageal reflux disease, unspecified whether esophagitis present    2. Depression, unspecified depression type    3. Hypertension, unspecified type          Plan:   1. Gastroesophageal reflux disease, unspecified whether esophagitis present  -     pantoprazole (PROTONIX) 40 MG tablet  -     Ambulatory referral/consult to Gastroenterology    rec GI follow up for possible egd and due for cscope anyway. Restart daily protonix and described appropriate way to take.     Follow up should symptoms persist or worsen. If symptoms become severe, please report immediately to urgent care or emergency room for further evaluation. Patient voiced understanding.      2. Depression, unspecified depression type  -     buPROPion (WELLBUTRIN SR) 150 MG TBSR 12 hr tablet    3. Hypertension, unspecified type  -     olmesartan-hydrochlorothiazide (BENICAR HCT) 40-25 mg per tablet        Needs to reest with new pcp.    Follow up if symptoms worsen or fail to improve.    Timothy Alvarez MD  Family Medicine / Primary Care  [facility]  8/4/2022

## 2022-10-06 ENCOUNTER — PATIENT MESSAGE (OUTPATIENT)
Dept: BARIATRICS | Facility: CLINIC | Age: 52
End: 2022-10-06
Payer: COMMERCIAL

## 2022-10-31 ENCOUNTER — TELEPHONE (OUTPATIENT)
Dept: INTERNAL MEDICINE | Facility: CLINIC | Age: 52
End: 2022-10-31
Payer: COMMERCIAL

## 2022-10-31 NOTE — TELEPHONE ENCOUNTER
----- Message from Nick Stewart sent at 10/31/2022  2:13 PM CDT -----  Contact: [phone]  Requesting an RX refill or new RX.  Is this a refill or new RX: refill  RX name and strength (copy/paste from chart):  olmesartan-hydrochlorothiazide (BENICAR HCT) 40-25 mg per tablet  Is this a 30 day or 90 day RX:   Pharmacy name and phone # (copy/paste from chart):    [id number] - [place], [place] - [address] AT [facility] [address]  [address]  [facility] [address]  Phone: [phone] Fax: [phone]  The doctors have asked that we provide their patients with the following 2 reminders -- prescription refills can take up to 72 hours, and a friendly reminder that in the future you can use your [facility] account to request refills: call back

## 2022-11-03 ENCOUNTER — OFFICE VISIT (OUTPATIENT)
Dept: INTERNAL MEDICINE | Facility: CLINIC | Age: 52
End: 2022-11-03
Payer: COMMERCIAL

## 2022-11-03 VITALS
BODY MASS INDEX: 40.74 KG/M2 | WEIGHT: 229.94 LBS | HEART RATE: 75 BPM | HEIGHT: 63 IN | SYSTOLIC BLOOD PRESSURE: 117 MMHG | OXYGEN SATURATION: 100 % | DIASTOLIC BLOOD PRESSURE: 83 MMHG

## 2022-11-03 DIAGNOSIS — K21.9 GASTROESOPHAGEAL REFLUX DISEASE, UNSPECIFIED WHETHER ESOPHAGITIS PRESENT: ICD-10-CM

## 2022-11-03 DIAGNOSIS — R06.02 SOB (SHORTNESS OF BREATH): ICD-10-CM

## 2022-11-03 DIAGNOSIS — I10 HYPERTENSION, UNSPECIFIED TYPE: ICD-10-CM

## 2022-11-03 PROCEDURE — 99213 OFFICE O/P EST LOW 20 MIN: CPT | Mod: S$GLB,,,

## 2022-11-03 PROCEDURE — 99999 PR PBB SHADOW E&M-EST. PATIENT-LVL III: CPT | Mod: PBBFAC,,,

## 2022-11-03 PROCEDURE — 99213 PR OFFICE/OUTPT VISIT, EST, LEVL III, 20-29 MIN: ICD-10-PCS | Mod: S$GLB,,,

## 2022-11-03 PROCEDURE — 99999 PR PBB SHADOW E&M-EST. PATIENT-LVL III: ICD-10-PCS | Mod: PBBFAC,,,

## 2022-11-03 RX ORDER — PANTOPRAZOLE SODIUM 40 MG/1
40 TABLET, DELAYED RELEASE ORAL DAILY
Qty: 30 TABLET | Refills: 2 | Status: SHIPPED | OUTPATIENT
Start: 2022-11-03 | End: 2023-09-07 | Stop reason: SDUPTHER

## 2022-11-03 RX ORDER — OLMESARTAN MEDOXOMIL AND HYDROCHLOROTHIAZIDE 40/25 40; 25 MG/1; MG/1
1 TABLET ORAL DAILY
Qty: 90 TABLET | Refills: 5 | Status: SHIPPED | OUTPATIENT
Start: 2022-11-03 | End: 2023-03-14 | Stop reason: ALTCHOICE

## 2022-11-03 RX ORDER — LEVALBUTEROL INHALATION SOLUTION 1.25 MG/3ML
1 SOLUTION RESPIRATORY (INHALATION) EVERY 6 HOURS PRN
Qty: 1 EACH | Refills: 10 | Status: SHIPPED | OUTPATIENT
Start: 2022-11-03 | End: 2023-01-10 | Stop reason: SDUPTHER

## 2022-11-03 NOTE — PROGRESS NOTES
Clinic Note  11/3/2022      Subjective:       Patient ID:  Araceli is a 52 y.o. female being seen for medication refill and to establish care.     Chief Complaint: Establish Care    She reports that she is in need of a refill of her antihypertensives and nebulizer. She is seeing a bariatric surgeon for workup for a gastric sleeve surgery. She reports preop clearance and copy of her sleep study 30 days prior to the procedure which is approximately January 2023. She also plans to have an EGD prior to the procedure and will discuss her colonoscopy with her GI. She plans to complete her mammogram with her Ob/Gyn.     INTERVAL HISTORY: Since her last visit she reports no new complaints or symptoms since her last visit. She uses her Levabuterol nebulaizer rarely and states her asthma is well controlled.  She reports palpitations while walking associates this with deconditioning, no associated CP, SOB.       Past Medical History:   Diagnosis Date    Back pain     Depression     GERD (gastroesophageal reflux disease)     Hypertension     Miscarriage        Past Surgical History:   Procedure Laterality Date    breast augmentation      DILATION AND CURETTAGE OF UTERUS      heel spurs  11/2018    TOTAL REDUCTION MAMMOPLASTY Bilateral 1994       Family History   Problem Relation Age of Onset    Colon cancer Paternal Grandfather     Diabetes Mother     Heart disease Father     Aortic aneurysm Brother     Cerebral aneurysm Maternal Aunt     Diabetes Maternal Grandfather        Social History     Socioeconomic History    Marital status:    Tobacco Use    Smoking status: Never    Smokeless tobacco: Never   Substance and Sexual Activity    Alcohol use: Yes     Comment: once a month    Drug use: Yes     Types: Marijuana     Comment: once a month    Sexual activity: Yes     Partners: Male     Comment: marrie   Social History Narrative    Patient is  with 2 children. Does not exercise and eats unhealthy diet. She plans to  start exercising at her gym.        Review of Systems   Constitutional:  Negative for chills, diaphoresis, fever and weight loss.   HENT:  Negative for congestion, hearing loss, sinus pain and sore throat.    Eyes:  Negative for blurred vision, double vision and photophobia.   Respiratory:  Negative for cough, shortness of breath and wheezing.    Cardiovascular:  Negative for chest pain, palpitations, orthopnea, leg swelling and PND.   Gastrointestinal:  Negative for abdominal pain, constipation, diarrhea, nausea and vomiting.   Genitourinary:  Negative for dysuria, flank pain and hematuria.   Musculoskeletal:  Negative for falls, joint pain and neck pain.   Skin:  Negative for rash.   Neurological:  Negative for dizziness, tingling, sensory change and headaches.     Medication List with Changes/Refills   Current Medications    BUPROPION (WELLBUTRIN SR) 150 MG TBSR 12 HR TABLET    Take 1 tablet (150 mg total) by mouth 2 (two) times daily.    BUTALBITAL-ACETAMINOPHEN-CAFF -40 MG -40 MG CAP    Take 1 capsule by mouth 3 (three) times daily as needed (for headache not to exceed 6 capsules per day).    FUROSEMIDE (LASIX) 20 MG TABLET    Take 1 tablet (20 mg total) by mouth daily as needed (leg swelling).    LEVALBUTEROL (XOPENEX) 1.25 MG/3 ML NEBULIZER SOLUTION    Take 3 mLs (1.25 mg total) by nebulization every 6 (six) hours as needed for Shortness of Breath. Rescue    OLMESARTAN-HYDROCHLOROTHIAZIDE (BENICAR HCT) 40-25 MG PER TABLET    Take 1 tablet by mouth once daily.    PANTOPRAZOLE (PROTONIX) 40 MG TABLET    Take 1 tablet (40 mg total) by mouth once daily.   Discontinued Medications    BUTALBITAL-ACETAMINOPHEN-CAFFEINE -40 MG (FIORICET, ESGIC) -40 MG PER TABLET    Take 1 tablet by mouth every 6 (six) hours as needed for Pain.    DICLOFENAC SODIUM (VOLTAREN) 1 % GEL    Apply 2 g topically 4 (four) times daily as needed.    FENOPROFEN (NAPROFEN) 600 MG TAB        HYOSCYAMINE (LEVSIN/SL) 0.125  "MG SUBL    Place 1 tablet (0.125 mg total) under the tongue every 4 (four) hours as needed (Abdominal cramping).    JUBLIA 10 % GIA    Apply 1 application topically once daily.    PROCHLORPERAZINE (COMPAZINE) 10 MG TABLET    Take 1 tablet (10 mg total) by mouth every 6 (six) hours as needed (headache).    ZOLPIDEM (AMBIEN) 5 MG TAB    Take 1 tablet (5 mg total) by mouth nightly as needed.       Patient Active Problem List   Diagnosis    Diverticulosis    Essential hypertension    Gastroesophageal reflux disease    Obesity (BMI 30-39.9)    Intermittent asthma    Insomnia    Snoring    BRYCE (obstructive sleep apnea)    Left ankle pain    Lymphedema of both lower extremities    Edema of left ankle    Severe obesity (BMI 35.0-39.9) with comorbidity               Objective:      /83 (BP Location: Left arm, Patient Position: Sitting, BP Method: Large (Automatic))   Pulse 75   Ht 5' 3" (1.6 m)   Wt 104.3 kg (229 lb 15 oz)   SpO2 100%   BMI 40.73 kg/m²   Estimated body mass index is 40.73 kg/m² as calculated from the following:    Height as of this encounter: 5' 3" (1.6 m).    Weight as of this encounter: 104.3 kg (229 lb 15 oz).      Physical Exam  Constitutional:       Appearance: Normal appearance.   HENT:      Head: Normocephalic and atraumatic.      Nose: Nose normal. No congestion or rhinorrhea.      Mouth/Throat:      Mouth: Mucous membranes are dry.   Eyes:      General: No scleral icterus.     Extraocular Movements: Extraocular movements intact.   Cardiovascular:      Rate and Rhythm: Normal rate and regular rhythm.      Heart sounds: No murmur heard.    No gallop.   Pulmonary:      Effort: Pulmonary effort is normal.      Breath sounds: Normal breath sounds.   Abdominal:      General: Abdomen is flat. There is no distension.      Palpations: Abdomen is soft.      Tenderness: There is no abdominal tenderness.   Musculoskeletal:         General: Normal range of motion.      Cervical back: Normal range of " motion and neck supple.   Skin:     General: Skin is warm and dry.      Capillary Refill: Capillary refill takes less than 2 seconds.   Neurological:      General: No focal deficit present.      Mental Status: She is alert and oriented to person, place, and time.   Psychiatric:         Mood and Affect: Mood normal.         Behavior: Behavior normal.         Assessment and Plan:         Problem List Items Addressed This Visit    None  Visit Diagnoses       Hypertension, unspecified type        SOB (shortness of breath)        Gastroesophageal reflux disease, unspecified whether esophagitis present                Follow Up:       Araceli was seen today for establish care.    Diagnoses and all orders for this visit:    Hypertension, unspecified type  The following orders have not been finalized:  -     olmesartan-hydrochlorothiazide (BENICAR HCT) 40-25 mg per tablet    SOB (shortness of breath)  The following orders have not been finalized:  -     levalbuterol (XOPENEX) 1.25 mg/3 mL nebulizer solution    Gastroesophageal reflux disease, unspecified whether esophagitis present  The following orders have not been finalized:  -     pantoprazole (PROTONIX) 40 MG tablet        Other Orders Placed This Visit:  No orders of the defined types were placed in this encounter.            Interview, Assessment, Findings, and Plan discussed with Dr. Bonner    No follow-ups on file.    Tae Melgoza DO, MS   Internal Medicine

## 2022-12-21 ENCOUNTER — TELEPHONE (OUTPATIENT)
Dept: INTERNAL MEDICINE | Facility: CLINIC | Age: 52
End: 2022-12-21
Payer: COMMERCIAL

## 2022-12-21 NOTE — TELEPHONE ENCOUNTER
----- Message from Darlin Belle sent at 12/21/2022 11:22 AM CST -----  Contact: self [phone]  Pt requesting a call for weight loss surgery pre op prior to 01/15/23. Also need to indicate she has sleep apnea.    Please call and advise

## 2022-12-21 NOTE — TELEPHONE ENCOUNTER
Spoke with pt and let them know that there pcp did not have any openings for her time frame. Got pt scheduled for a pre op visit with there provider colleague instead. Pt verbalized understanding.

## 2023-01-10 ENCOUNTER — OFFICE VISIT (OUTPATIENT)
Dept: INTERNAL MEDICINE | Facility: CLINIC | Age: 53
End: 2023-01-10
Payer: COMMERCIAL

## 2023-01-10 VITALS
SYSTOLIC BLOOD PRESSURE: 124 MMHG | BODY MASS INDEX: 39.72 KG/M2 | WEIGHT: 224.19 LBS | DIASTOLIC BLOOD PRESSURE: 88 MMHG

## 2023-01-10 DIAGNOSIS — R06.02 SOB (SHORTNESS OF BREATH): ICD-10-CM

## 2023-01-10 DIAGNOSIS — Z01.818 PREOPERATIVE CLEARANCE: Primary | ICD-10-CM

## 2023-01-10 PROCEDURE — 99213 OFFICE O/P EST LOW 20 MIN: CPT | Mod: S$GLB,,, | Performed by: STUDENT IN AN ORGANIZED HEALTH CARE EDUCATION/TRAINING PROGRAM

## 2023-01-10 PROCEDURE — 99999 PR PBB SHADOW E&M-EST. PATIENT-LVL III: CPT | Mod: PBBFAC,,, | Performed by: STUDENT IN AN ORGANIZED HEALTH CARE EDUCATION/TRAINING PROGRAM

## 2023-01-10 PROCEDURE — 99999 PR PBB SHADOW E&M-EST. PATIENT-LVL III: ICD-10-PCS | Mod: PBBFAC,,, | Performed by: STUDENT IN AN ORGANIZED HEALTH CARE EDUCATION/TRAINING PROGRAM

## 2023-01-10 PROCEDURE — 99213 PR OFFICE/OUTPT VISIT, EST, LEVL III, 20-29 MIN: ICD-10-PCS | Mod: S$GLB,,, | Performed by: STUDENT IN AN ORGANIZED HEALTH CARE EDUCATION/TRAINING PROGRAM

## 2023-01-10 RX ORDER — LEVALBUTEROL INHALATION SOLUTION 1.25 MG/3ML
1 SOLUTION RESPIRATORY (INHALATION) EVERY 6 HOURS PRN
Qty: 1 EACH | Refills: 10 | Status: SHIPPED | OUTPATIENT
Start: 2023-01-10 | End: 2024-01-10

## 2023-01-10 NOTE — LETTER
January 10, 2023      [facility]  [address]  [place] [address]  Phone: [phone]  Fax: [phone]       Patient: Nadine Nadine Claude   YOB: 1970  Date of Visit: 01/10/2023    To Whom It May Concern:    Nadine Claude  was at [facility] on 01/10/2023. The patient may return to work on 1/11/2023 with no restrictions. If you have any questions or concerns, or if I can be of further assistance, please do not hesitate to contact me.    Sincerely,    Taniya Moreno MD

## 2023-01-10 NOTE — PROGRESS NOTES
Internal Medicine Clinic Note  01/10/2023    Subjective   Patient Name: Nadine M Claude  : 1970    Chief Complaint:   Chief Complaint   Patient presents with    Pre-op Exam     History of Present Illness:  Ms. Nadine M Claude is a 52 y.o. female with history of HTN, GERD, BRYCE, asthma who presents to clinic today for a pre-operative exam for upcoming weight loss surgery. No active complaints. Recently completed labs at [facility] without abnormalities. Cardiac risk assessment documented below.     Review of Systems   Constitutional:  Negative for fever.   Respiratory:  Negative for shortness of breath.    Cardiovascular:  Negative for chest pain, palpitations, orthopnea and PND.   Gastrointestinal:  Negative for abdominal pain and vomiting.   Skin:  Negative for rash.   Neurological:  Negative for dizziness and headaches.     PAST HISTORY:     Past Medical History:   Diagnosis Date    Back pain     Depression     GERD (gastroesophageal reflux disease)     Hypertension     Miscarriage        Past Surgical History:   Procedure Laterality Date    breast augmentation      DILATION AND CURETTAGE OF UTERUS      heel spurs  2018    TOTAL REDUCTION MAMMOPLASTY Bilateral 1994       Family History   Problem Relation Age of Onset    Colon cancer Paternal Grandfather     Diabetes Mother     Heart disease Father     Aortic aneurysm Brother     Cerebral aneurysm Maternal Aunt     Diabetes Maternal Grandfather        Social History     Socioeconomic History    Marital status:    Tobacco Use    Smoking status: Never    Smokeless tobacco: Never   Substance and Sexual Activity    Alcohol use: Yes     Comment: once a month    Drug use: Yes     Types: Marijuana     Comment: once a month    Sexual activity: Yes     Partners: Male     Comment: marrilacy   Social History Narrative    Patient is  with 2 children. Does not exercise and eats unhealthy diet. She plans to start exercising at her gym.         MEDICATIONS & ALLERGIES:     Current Outpatient Medications on File Prior to Visit   Medication Sig    buPROPion (WELLBUTRIN SR) 150 MG TBSR 12 hr tablet Take 1 tablet (150 mg total) by mouth 2 (two) times daily.    butalbital-acetaminophen-caff -40 mg -40 mg Cap Take 1 capsule by mouth 3 (three) times daily as needed (for headache not to exceed 6 capsules per day).    furosemide (LASIX) 20 MG tablet Take 1 tablet (20 mg total) by mouth daily as needed (leg swelling).    levalbuterol (XOPENEX) 1.25 mg/3 mL nebulizer solution Take 3 mLs (1.25 mg total) by nebulization every 6 (six) hours as needed for Shortness of Breath. Rescue    olmesartan-hydrochlorothiazide (BENICAR HCT) 40-25 mg per tablet Take 1 tablet by mouth once daily.    pantoprazole (PROTONIX) 40 MG tablet Take 1 tablet (40 mg total) by mouth once daily.     No current facility-administered medications on file prior to visit.       Review of patient's allergies indicates:   Allergen Reactions    Codeine Hallucinations and Other (See Comments)     Other reaction(s): Other (See Comments)  Hallucinations         OBJECTIVE:     Vital Signs:  Vitals:    01/10/23 1329   BP: 124/88   Weight: 101.7 kg (224 lb 3.3 oz)       Body mass index is 39.72 kg/m².     Physical Exam  Vitals reviewed.   Constitutional:       General: She is not in acute distress.     Appearance: Normal appearance. She is obese. She is not ill-appearing.   HENT:      Head: Normocephalic and atraumatic.      Mouth/Throat:      Mouth: Mucous membranes are moist.   Eyes:      Extraocular Movements: Extraocular movements intact.   Cardiovascular:      Rate and Rhythm: Normal rate and regular rhythm.   Pulmonary:      Effort: Pulmonary effort is normal. No respiratory distress.      Breath sounds: Normal breath sounds.   Musculoskeletal:         General: Normal range of motion.      Right lower leg: No edema.      Left lower leg: No edema.   Skin:     General: Skin is warm.    Neurological:      Mental Status: She is alert and oriented to person, place, and time. Mental status is at baseline.   Psychiatric:         Mood and Affect: Mood normal.         Behavior: Behavior normal.         Thought Content: Thought content normal.         Judgment: Judgment normal.     Laboratory  Lab Results   Component Value Date    WBC 10.34 08/02/2022    HGB 12.7 08/02/2022    HCT 42 08/02/2022    MCV 87 08/02/2022     08/02/2022     BMP  Lab Results   Component Value Date     08/02/2022    K 4.0 08/02/2022     08/02/2022    CO2 25 08/02/2022    BUN 24 (H) 08/02/2022    CREATININE 1.0 08/02/2022    CALCIUM 9.9 08/02/2022    ANIONGAP 11 08/02/2022    EGFRNORACEVR >60.0 08/02/2022     Lab Results   Component Value Date    INR 0.9 01/19/2021     No results found for: HGBA1C  No results for input(s): POCTGLUCOSE in the last 72 hours.    Health Maintenance Due   Topic Date Due    Hepatitis C Screening  Never done    HIV Screening  Never done    Hemoglobin A1c (Diabetic Prevention Screening)  Never done    Colorectal Cancer Screening  Never done    COVID-19 Vaccine (4 - Booster for Moderna series) 03/02/2022    Shingles Vaccine (1 of 2) Never done    Mammogram  09/30/2022       ASSESSMENT & PLAN:   Ms. Nadine M Claude is a 52 y.o. female who was seen today in clinic for pre-operative clearance. Cardiac risk assessment documented below.   Surgical Risk Assessment   Active cardiac issues:  Active decompensated heart failure? No   Unstable angina?  No   Significant uncontrolled arrhythmias? No   Severe valvular heart disease-Aortic or Mitral Stenosis? No   Recent MI or coronary revascularization < 30 days? No     Cardiac Risk Factors  History of CAD/ischemic heart disease? No   History of cerebrovascular disease?   No   History of congestive heart failure? No   Type 2 diabetes requiring insulin? No   Serum Creatinine > 2? No   Total cardiac risk factors 0   Add 1 point for high risk surgery  (intraperitoneal, intrathoracic, or suprainguinal vascular)  Class I Risk (0 points): 3.9%    Functional mets          > 4* METs -climbing > 1 flight of stairs without stopping  -walking up hill > 1-2 blocks  -scrubbing floors  -moving furniture  - golf, bowling, dancing or tennis  -running short distance MODERATE to EXCELLENT   * performance of any one of the activities     Other Issues:       Anticoagulation:  None      Coronary Stenting: No    Recommendation  Her estimated risk with the proposed surgery/procedure for an adverse perioperative cardiac outcome (MI, pulmonary edema, ventricular fibrillation or primary  cardiac arrest, and complete heart block) is 0.4% (0.05%-1.5%) and for an adverse 30 day cardiac outcome (myocardial infarction, cardiac arrest, or death) is 3.9% (95% CI 2.8%-5.4%) (Revised Cardiac Risk Index [RCRI] Score = 0  based on the following risk factors: None). (Perioperative outcomes - Beny at al Circ 1999; 100: 2909-5421; 30 day outcomes - Tanisha et al. Can J Cardiol 2017; 33:17-32)    1. Patient has the above khang-operative risk at this time given the information currently available.     Discussed with Dr. Samaniego  - staff attestation to follow      Taniya Moreno MD  Internal Medicine, PGY-III  [facility]

## 2023-01-21 ENCOUNTER — OFFICE VISIT (OUTPATIENT)
Dept: URGENT CARE | Facility: CLINIC | Age: 53
End: 2023-01-21
Payer: COMMERCIAL

## 2023-01-21 VITALS
RESPIRATION RATE: 18 BRPM | OXYGEN SATURATION: 95 % | TEMPERATURE: 98 F | BODY MASS INDEX: 38.41 KG/M2 | DIASTOLIC BLOOD PRESSURE: 79 MMHG | HEIGHT: 64 IN | HEART RATE: 98 BPM | SYSTOLIC BLOOD PRESSURE: 110 MMHG | WEIGHT: 225 LBS

## 2023-01-21 DIAGNOSIS — M25.571 ACUTE RIGHT ANKLE PAIN: ICD-10-CM

## 2023-01-21 DIAGNOSIS — M10.9 ACUTE GOUT OF RIGHT ANKLE, UNSPECIFIED CAUSE: Primary | ICD-10-CM

## 2023-01-21 PROCEDURE — 96372 PR INJECTION,THERAP/PROPH/DIAG2ST, IM OR SUBCUT: ICD-10-PCS | Mod: S$GLB,,, | Performed by: FAMILY MEDICINE

## 2023-01-21 PROCEDURE — 99214 PR OFFICE/OUTPT VISIT, EST, LEVL IV, 30-39 MIN: ICD-10-PCS | Mod: 25,S$GLB,, | Performed by: FAMILY MEDICINE

## 2023-01-21 PROCEDURE — 99214 OFFICE O/P EST MOD 30 MIN: CPT | Mod: 25,S$GLB,, | Performed by: FAMILY MEDICINE

## 2023-01-21 PROCEDURE — 96372 THER/PROPH/DIAG INJ SC/IM: CPT | Mod: S$GLB,,, | Performed by: FAMILY MEDICINE

## 2023-01-21 RX ORDER — KETOROLAC TROMETHAMINE 30 MG/ML
30 INJECTION, SOLUTION INTRAMUSCULAR; INTRAVENOUS
Status: COMPLETED | OUTPATIENT
Start: 2023-01-21 | End: 2023-01-21

## 2023-01-21 RX ORDER — METHYLPREDNISOLONE 4 MG/1
4 TABLET ORAL DAILY
Qty: 21 TABLET | Refills: 0 | Status: SHIPPED | OUTPATIENT
Start: 2023-01-21 | End: 2023-01-27

## 2023-01-21 RX ADMIN — KETOROLAC TROMETHAMINE 30 MG: 30 INJECTION, SOLUTION INTRAMUSCULAR; INTRAVENOUS at 05:01

## 2023-01-21 NOTE — LETTER
January 21, 2023      [facility]gent [facility] - [address] [place] [facility] [address]  Phone: [phone]  Fax: [phone]       Patient: Nadine Nadine Claude   YOB: 1970  Date of Visit: 01/21/2023    To Whom It May Concern:    Nadine Claude  was at [facility] on 01/21/2023. The patient may return to work/school on 1/22/23 with no restrictions. If you have any questions or concerns, or if I can be of further assistance, please do not hesitate to contact me.    Sincerely,    Juan Manuel Hardy MD

## 2023-01-21 NOTE — PROGRESS NOTES
"Subjective:       Patient ID: Nadine M Claude is a 52 y.o. female.    Vitals:  height is 5' 4" (1.626 m) and weight is 102.1 kg (225 lb). Her oral temperature is 98.1 °F (36.7 °C). Her blood pressure is 110/79 and her pulse is 98. Her respiration is 18 and oxygen saturation is 95%.     Chief Complaint: Ankle Pain    52 years old female with history of gout presents with right ankle swelling and pain for 2 days.  Reports eating red meat and and moderate alcohol use recently.  Denies fever, chills, fatigue.  Denies any injury.    Ankle Pain   The incident occurred 12 to 24 hours ago. There was no injury mechanism. The pain is present in the right ankle. The pain is at a severity of 8/10. The pain has been Constant since onset. Pertinent negatives include no inability to bear weight, loss of motion, loss of sensation, muscle weakness, numbness or tingling. She reports no foreign bodies present. The symptoms are aggravated by weight bearing.     Neurological:  Negative for numbness.     Objective:      Physical Exam   Constitutional: She is oriented to person, place, and time. She appears well-developed. She is cooperative.  Non-toxic appearance. She does not appear ill. No distress.   HENT:   Head: Normocephalic and atraumatic.   Ears:   Right Ear: Hearing, tympanic membrane, external ear and ear canal normal. impacted cerumen  Left Ear: Hearing, tympanic membrane, external ear and ear canal normal. impacted cerumen  Nose: Nose normal. No mucosal edema, rhinorrhea, nasal deformity or congestion. No epistaxis. Right sinus exhibits no maxillary sinus tenderness and no frontal sinus tenderness. Left sinus exhibits no maxillary sinus tenderness and no frontal sinus tenderness.   Mouth/Throat: Uvula is midline, oropharynx is clear and moist and mucous membranes are normal. No trismus in the jaw. Normal dentition. No uvula swelling. No oropharyngeal exudate or posterior oropharyngeal erythema.   Eyes: Conjunctivae and lids " are normal. Right eye exhibits no discharge. Left eye exhibits no discharge. No scleral icterus.   Neck: Trachea normal and phonation normal. Neck supple.   Cardiovascular: Normal rate, regular rhythm, normal heart sounds and normal pulses.   No murmur heard.  Pulmonary/Chest: Effort normal and breath sounds normal. No stridor. No respiratory distress. She has no wheezes. She has no rhonchi. She has no rales.   Abdominal: Normal appearance and bowel sounds are normal. She exhibits no distension and no mass. Soft. There is no abdominal tenderness.   Musculoskeletal: Normal range of motion.         General: Swelling and tenderness present. No deformity or signs of injury. Normal range of motion.      Cervical back: She exhibits no tenderness.      Comments:   Right Ankle:     +ve mild swelling over lateral malleolus  +ve localized TTP to above area.  No bruise,  No abrasion.  ROM: painful dorsiflexion and plantiflexion  Neurovascular intact.     Foot exam WNL.       Lymphadenopathy:     She has no cervical adenopathy.   Neurological: She is alert and oriented to person, place, and time. She exhibits normal muscle tone. Coordination normal.   Skin: Skin is warm, dry, intact, not diaphoretic and not pale.   Psychiatric: Her speech is normal and behavior is normal. Judgment and thought content normal.   Nursing note and vitals reviewed.      Assessment:       1. Acute gout of right ankle, unspecified cause    2. Acute right ankle pain          Plan:         Acute gout of right ankle, unspecified cause    Acute right ankle pain    Other orders  -     ketorolac injection 30 mg  -     methylPREDNISolone (MEDROL DOSEPACK) 4 mg tablet; Take 1 tablet (4 mg total) by mouth once daily. Take as directed for 6 days  Dispense: 21 tablet; Refill: 0

## 2023-02-22 ENCOUNTER — HOSPITAL ENCOUNTER (EMERGENCY)
Facility: HOSPITAL | Age: 53
Discharge: HOME | End: 2023-02-22
Attending: EMERGENCY MEDICINE
Payer: COMMERCIAL

## 2023-02-22 VITALS
SYSTOLIC BLOOD PRESSURE: 118 MMHG | HEART RATE: 86 BPM | BODY MASS INDEX: 38.41 KG/M2 | OXYGEN SATURATION: 98 % | RESPIRATION RATE: 18 BRPM | DIASTOLIC BLOOD PRESSURE: 80 MMHG | WEIGHT: 225 LBS | HEIGHT: 64 IN | TEMPERATURE: 99 F

## 2023-02-22 DIAGNOSIS — T14.8XXA MUSCLE STRAIN: ICD-10-CM

## 2023-02-22 DIAGNOSIS — M25.559 HIP PAIN: Primary | ICD-10-CM

## 2023-02-22 PROCEDURE — 25000003 PHARM REV CODE 250

## 2023-02-22 PROCEDURE — 63600175 PHARM REV CODE 636 W HCPCS

## 2023-02-22 PROCEDURE — 99283 PR EMERGENCY DEPT VISIT,LEVEL III: ICD-10-PCS | Mod: ,,,

## 2023-02-22 PROCEDURE — 99283 EMERGENCY DEPT VISIT LOW MDM: CPT | Mod: ,,,

## 2023-02-22 PROCEDURE — 96372 THER/PROPH/DIAG INJ SC/IM: CPT

## 2023-02-22 PROCEDURE — 99284 EMERGENCY DEPT VISIT MOD MDM: CPT

## 2023-02-22 RX ORDER — KETOROLAC TROMETHAMINE 30 MG/ML
10 INJECTION, SOLUTION INTRAMUSCULAR; INTRAVENOUS
Status: COMPLETED | OUTPATIENT
Start: 2023-02-22 | End: 2023-02-22

## 2023-02-22 RX ORDER — GABAPENTIN 300 MG/1
300 CAPSULE ORAL 2 TIMES DAILY
COMMUNITY

## 2023-02-22 RX ORDER — LIDOCAINE 50 MG/G
1 PATCH TOPICAL DAILY
Qty: 15 PATCH | Refills: 0 | Status: SHIPPED | OUTPATIENT
Start: 2023-02-22 | End: 2023-09-07 | Stop reason: SDUPTHER

## 2023-02-22 RX ORDER — LIDOCAINE 50 MG/G
2 PATCH TOPICAL
Status: DISCONTINUED | OUTPATIENT
Start: 2023-02-22 | End: 2023-02-23 | Stop reason: HOSPADM

## 2023-02-22 RX ADMIN — KETOROLAC TROMETHAMINE 10 MG: 30 INJECTION, SOLUTION INTRAMUSCULAR; INTRAVENOUS at 04:02

## 2023-02-22 RX ADMIN — LIDOCAINE 2 PATCH: 50 PATCH CUTANEOUS at 04:02

## 2023-02-22 NOTE — ED NOTES
Patient identifiers verified and correct for MS Claude  C/C:  Left hip pain SEE NN  APPEARANCE: awake and alert in NAD. PAIN 7/10  SKIN: warm, dry and intact. No breakdown or bruising.  MUSCULOSKELETAL: Patient moving all extremities spontaneously, no obvious swelling or deformities noted. Ambulates independently.  RESPIRATORY: Denies shortness of breath.Respirations unlabored.   CARDIAC: Denies CP, 2+ distal pulses; no peripheral edema  ABDOMEN: S/ND/NT, Denies nausea  : voids spontaneously, denies difficulty  Neurologic: AAO x 4; follows commands equal strength in all extremities; denies numbness/tingling. Denies dizziness  Denies new weakness, pain to left hip area

## 2023-02-22 NOTE — ED PROVIDER NOTES
Encounter Date: 2/22/2023       History     Chief Complaint   Patient presents with    Hip Pain     Pt complains of left hip pain since this morning, pt denies any injuries or falls.      52-year-old female with known arthritis, occurred and depression presents to the emergency department for nontraumatic left hip pain that began yesterday at 6:00 p.m. while sitting down.  Patient describes it as a constant sharp pain originating to her left lateral buttocks radiating into the anterior thigh.  She reports experiencing a similar episode in the past however feels this has worsened presentation.  She has not taking any over-the-counter medications for her current symptoms. Patient reports walking but not being overly active despite the holiday season. She reports she is unable to ambulate normally due to pain. She denies lower back pain, dysuria or hematuria.     Review of patient's allergies indicates:   Allergen Reactions    Codeine Hallucinations and Other (See Comments)     Other reaction(s): Other (See Comments)  Hallucinations       Past Medical History:   Diagnosis Date    Back pain     Depression     GERD (gastroesophageal reflux disease)     Hypertension     Miscarriage      Past Surgical History:   Procedure Laterality Date    breast augmentation      DILATION AND CURETTAGE OF UTERUS      heel spurs  11/2018    TOTAL REDUCTION MAMMOPLASTY Bilateral 1994     Family History   Problem Relation Age of Onset    Colon cancer Paternal Grandfather     Diabetes Mother     Heart disease Father     Aortic aneurysm Brother     Cerebral aneurysm Maternal Aunt     Diabetes Maternal Grandfather      Social History     Tobacco Use    Smoking status: Never    Smokeless tobacco: Never   Substance Use Topics    Alcohol use: Yes     Comment: once a month    Drug use: Yes     Types: Marijuana     Comment: once a month     Review of Systems   Constitutional:  Negative for fever.   HENT:  Negative for sore throat.    Respiratory:   Negative for shortness of breath.    Cardiovascular:  Negative for chest pain.   Gastrointestinal:  Negative for nausea.   Genitourinary:  Negative for dysuria.   Musculoskeletal:  Positive for arthralgias (Left hip), gait problem and myalgias. Negative for back pain.   Skin:  Negative for color change and rash.   Neurological:  Negative for weakness.   Hematological:  Does not bruise/bleed easily.     Physical Exam     Initial Vitals [02/22/23 1442]   BP Pulse Resp Temp SpO2   121/75 94 20 98.2 °F (36.8 °C) 97 %      MAP       --         Physical Exam    Nursing note and vitals reviewed.  Constitutional: She appears well-developed and well-nourished.   HENT:   Head: Normocephalic and atraumatic.   Eyes: Conjunctivae and EOM are normal.   Neck:   Normal range of motion.  Cardiovascular:  Normal rate.           Pulmonary/Chest: No respiratory distress.   Abdominal: Abdomen is soft. She exhibits no distension. There is no abdominal tenderness. There is no rebound.   Musculoskeletal:      Cervical back: Normal range of motion.      Comments: Patient is a reformed flexion-extension of left hip along with abduction and adduction  There is TTP primarily along left buttocks, no bony tenderness on exam  Patient is also tender to palpation on anterior aspect of thigh     Neurological: She is alert and oriented to person, place, and time.   Skin: Skin is warm and dry.   Psychiatric: She has a normal mood and affect. Thought content normal.       ED Course   Procedures  Labs Reviewed - No data to display       Imaging Results              X-Ray Hip 2 or 3 views Left (with Pelvis when performed) (Final result)  Result time 02/22/23 18:01:17      Final result by Terence Mendez MD (02/22/23 18:01:17)                   Impression:      No acute displaced fracture-dislocation identified.      Electronically signed by: Terence Mendez MD  Date:    02/22/2023  Time:    18:01               Narrative:    EXAMINATION:  XR HIP WITH PELVIS  WHEN PERFORMED, 2 OR 3 VIEWS LEFT    CLINICAL HISTORY:  Pain, unspecified    TECHNIQUE:  AP view of the pelvis and frog leg lateral view of the left hip were performed.    COMPARISON:  Left hip series 05/27/2012 and CT abdomen and pelvis 08/02/2022    FINDINGS:  Bones are well mineralized. Overall alignment is within normal limits. No displaced fracture, dislocation or destructive osseous process.  Moderate facet arthrosis at the imaged lower lumbar spine similar to prior.  Age-related minimal to mild degenerative change of the pubic symphysis and bilateral sacroiliac and hip joints, not significantly progressed from priors.  Few scattered punctate pelvic phleboliths noted.  No subcutaneous emphysema or radiodense retained foreign body.                                       Medications   LIDOcaine 5 % patch 2 patch (2 patches Transdermal Patch Applied 2/22/23 1622)   ketorolac injection 9.999 mg (9.999 mg Intramuscular Given 2/22/23 1623)     Medical Decision Making:   Initial Assessment:   52-year-old female presents emergency department with atraumatic left hip pain  Differential Diagnosis:   Differential diagnosis includes:  Progressive arthritic changes, pathological fracture, musculoskeletal pain  Clinical Tests:   Radiological Study: Ordered  ED Management:  X-ray of imaging ordered of hip  On physical exam TTP overlying gluteus preet an anterior thigh - I have high suspicion this is muscular involvement  Patient given Toradol with Lidoderm patch- on reassessment she reports improvement of symptoms  Imaging did not reveal feel fracture or dislocation as suspected, however continued arthritic changes  I spoke to patient about her results how to manage MSK pain at home with conservative treatment of over-the-counter meds rest and using Lidoderm patches as needed.  Also recommended she follow up with the orthopedic doctor for arthritis  She verbalized understanding with pain  Patient is able to ambulate without  difficulty discharged home                        Clinical Impression:   Final diagnoses:  [M25.559] Hip pain (Primary)  [T14.8XXA] Muscle strain        ED Disposition Condition    Discharge Stable          ED Prescriptions       Medication Sig Dispense Start Date End Date Auth. Provider    LIDOcaine (LIDODERM) 5 % Place 1 patch onto the skin once daily. Remove & Discard patch within 12 hours or as directed by MD 15 patch 2/22/2023 -- Terri Stephens PA-C          Follow-up Information       Follow up With Specialties Details Why Contact Info    Tae Melgoza,  Internal Medicine   [address]  [place] [address]  [phone]               Terri Stephens PA-C  02/22/23 1559

## 2023-02-22 NOTE — Clinical Note
"Araceli Ambrose" Claude was seen and treated in our emergency department on 2/22/2023.  She may return to work on 02/24/2023.       If you have any questions or concerns, please don't hesitate to call.      Terri Stephens PA-C"

## 2023-02-23 NOTE — DISCHARGE INSTRUCTIONS
For pain continue to take cetaminophen (Tylenol), ibuprofen (Motrin, Advil) or naproxen (Naprosyn, Aleve) over-the-counter for pain as needed.  Please strictly follow all instructions on the packaging and do not take more medication per dose and per day than the instructions recommend.     Also recommend following up with the orthopedic doctor for arthritic pains  Activity as tolerated  Hip imaging did not show any fractures

## 2023-03-14 ENCOUNTER — NURSE TRIAGE (OUTPATIENT)
Dept: ADMINISTRATIVE | Facility: CLINIC | Age: 53
End: 2023-03-14
Payer: COMMERCIAL

## 2023-03-14 ENCOUNTER — TELEPHONE (OUTPATIENT)
Dept: INTERNAL MEDICINE | Facility: CLINIC | Age: 53
End: 2023-03-14
Payer: COMMERCIAL

## 2023-03-14 DIAGNOSIS — I10 HYPERTENSION, UNSPECIFIED TYPE: Primary | ICD-10-CM

## 2023-03-14 RX ORDER — AMLODIPINE BESYLATE 5 MG/1
5 TABLET ORAL DAILY
Qty: 30 TABLET | Refills: 11 | Status: SHIPPED | OUTPATIENT
Start: 2023-03-14 | End: 2024-03-13

## 2023-03-14 RX ORDER — OLMESARTAN MEDOXOMIL 40 MG/1
40 TABLET ORAL DAILY
Qty: 90 TABLET | Refills: 3 | Status: SHIPPED | OUTPATIENT
Start: 2023-03-14 | End: 2024-03-13

## 2023-03-14 RX ORDER — AMLODIPINE BESYLATE 5 MG/1
5 TABLET ORAL DAILY
Qty: 30 TABLET | Refills: 11 | Status: SHIPPED | OUTPATIENT
Start: 2023-03-14 | End: 2023-03-14

## 2023-03-14 RX ORDER — OLMESARTAN MEDOXOMIL 40 MG/1
40 TABLET ORAL DAILY
Qty: 90 TABLET | Refills: 3 | Status: SHIPPED | OUTPATIENT
Start: 2023-03-14 | End: 2023-03-14

## 2023-03-14 NOTE — TELEPHONE ENCOUNTER
Post op gastric sleeve 3/10. Was advised to stop BP med since fluid pill combo. Called last night, says on call advised to f/u with pcp for different BP med.     Currently 164/98. HR 76, says intermittently feels like heart skipping a beat, denies feeling now. C/o generalized 5/10 headache, unsure if related to pressure or decreased sugar intake. Denies blurred vision. + sensitivity to light.     BP protocol recommends callback from clinic within 1 hr regarding need for alternative BP medication.      Pt answers YES to worse headache of her life in headache protocol-recommends UC/ER or office with approval. Unable to schedule in clinic, declines VV. Pt advised in the event she does not hear back from PCP within 1 hr and/or symptoms change/worsen before then, to f/u in UC for eval. Pt vu, will attempt to reach surgeon clinic while awaiting clinic callback.     Reason for Disposition   Ran out of BP medications     Advised to stop medication. Needs new rx   SEVERE headache, sudden-onset (i.e., reaching maximum intensity within seconds to 1 hour)    Additional Information   Negative: Sounds like a life-threatening emergency to the triager   Negative: Systolic BP >= 160 OR Diastolic >= 100, and any cardiac or neurologic symptoms (e.g., chest pain, difficulty breathing, unsteady gait, blurred vision)   Negative: Pregnant 20 or more weeks (or postpartum < 6 weeks) with new hand or face swelling   Negative: Pregnant 20 or more weeks (or postpartum < 6 weeks) and Systolic BP >= 160 OR Diastolic >= 100   Negative: Patient sounds very sick or weak to the triager   Negative: Systolic BP >= 200 OR Diastolic >= 120 and having NO cardiac or neurologic symptoms   Negative: Pregnant 20 or more weeks (or postpartum < 6 weeks) with Systolic BP >= 140 OR Diastolic >= 90   Negative: Systolic BP >= 180 OR Diastolic >= 110, and missed most recent dose of blood pressure medication   Negative: Systolic BP >= 180 OR Diastolic >= 110    Negative: Patient wants to be seen   Negative: Difficult to awaken or acting confused (e.g., disoriented, slurred speech)   Negative: Weakness of the face, arm or leg on one side of the body and new-onset   Negative: Numbness of the face, arm or leg on one side of the body and new-onset   Negative: Loss of speech or garbled speech and new-onset   Negative: Passed out (i.e., fainted, collapsed and was not responding)   Negative: Sounds like a life-threatening emergency to the triager   Negative: Unable to walk without falling   Negative: Possibility of carbon monoxide exposure   Negative: Stiff neck (can't touch chin to chest)   Negative: SEVERE headache, states 'worst headache' of life    Protocols used: Blood Pressure - High-A-OH, Headache-A-OH

## 2023-03-14 NOTE — TELEPHONE ENCOUNTER
Patient post op from gastric sleeve and was advised by surgery to stop Olmasartan HCTZ due to diuretic component and difficulty remaining hydrated with gastric sleeve. Called and discussed changes to regimen with patient. Patient reports current BP at home of 167/85    Plan:   - Will discontinue Olmasartan/HCTZ  - Start Olmasartan 40mg   - Start Amlodipine 5mg   - Follow up in 2 weeks

## 2023-03-15 ENCOUNTER — HOSPITAL ENCOUNTER (EMERGENCY)
Facility: HOSPITAL | Age: 53
Discharge: HOME | End: 2023-03-15
Attending: EMERGENCY MEDICINE
Payer: COMMERCIAL

## 2023-03-15 VITALS
HEART RATE: 66 BPM | RESPIRATION RATE: 18 BRPM | TEMPERATURE: 98 F | SYSTOLIC BLOOD PRESSURE: 155 MMHG | DIASTOLIC BLOOD PRESSURE: 85 MMHG | WEIGHT: 220 LBS | BODY MASS INDEX: 38.98 KG/M2 | OXYGEN SATURATION: 99 % | HEIGHT: 63 IN

## 2023-03-15 DIAGNOSIS — E16.2 HYPOGLYCEMIA: Primary | ICD-10-CM

## 2023-03-15 DIAGNOSIS — E86.0 DEHYDRATION: ICD-10-CM

## 2023-03-15 DIAGNOSIS — R00.2 PALPITATIONS: ICD-10-CM

## 2023-03-15 LAB
ALBUMIN SERPL BCP-MCNC: 3.6 G/DL (ref 3.5–5.2)
ALP SERPL-CCNC: 85 U/L (ref 55–135)
ALT SERPL W/O P-5'-P-CCNC: 46 U/L (ref 10–44)
ANION GAP SERPL CALC-SCNC: 13 MMOL/L (ref 8–16)
AST SERPL-CCNC: 32 U/L (ref 10–40)
B-HCG UR QL: NEGATIVE
BASOPHILS # BLD AUTO: 0.03 K/UL (ref 0–0.2)
BASOPHILS NFR BLD: 0.3 % (ref 0–1.9)
BILIRUB SERPL-MCNC: 0.8 MG/DL (ref 0.1–1)
BILIRUB UR QL STRIP: NEGATIVE
BUN SERPL-MCNC: 10 MG/DL (ref 6–20)
CALCIUM SERPL-MCNC: 9.4 MG/DL (ref 8.7–10.5)
CHLORIDE SERPL-SCNC: 100 MMOL/L (ref 95–110)
CLARITY UR REFRACT.AUTO: ABNORMAL
CO2 SERPL-SCNC: 22 MMOL/L (ref 23–29)
COLOR UR AUTO: YELLOW
CREAT SERPL-MCNC: 0.7 MG/DL (ref 0.5–1.4)
CTP QC/QA: YES
DIFFERENTIAL METHOD: ABNORMAL
EOSINOPHIL # BLD AUTO: 0.1 K/UL (ref 0–0.5)
EOSINOPHIL NFR BLD: 1.2 % (ref 0–8)
ERYTHROCYTE [DISTWIDTH] IN BLOOD BY AUTOMATED COUNT: 14.5 % (ref 11.5–14.5)
EST. GFR  (NO RACE VARIABLE): >60 ML/MIN/1.73 M^2
GLUCOSE SERPL-MCNC: 66 MG/DL (ref 70–110)
GLUCOSE UR QL STRIP: NEGATIVE
HCT VFR BLD AUTO: 37.8 % (ref 37–48.5)
HGB BLD-MCNC: 11.7 G/DL (ref 12–16)
HGB UR QL STRIP: NEGATIVE
IMM GRANULOCYTES # BLD AUTO: 0.04 K/UL (ref 0–0.04)
IMM GRANULOCYTES NFR BLD AUTO: 0.5 % (ref 0–0.5)
KETONES UR QL STRIP: ABNORMAL
LEUKOCYTE ESTERASE UR QL STRIP: NEGATIVE
LYMPHOCYTES # BLD AUTO: 1.9 K/UL (ref 1–4.8)
LYMPHOCYTES NFR BLD: 21.6 % (ref 18–48)
MAGNESIUM SERPL-MCNC: 2 MG/DL (ref 1.6–2.6)
MCH RBC QN AUTO: 27.7 PG (ref 27–31)
MCHC RBC AUTO-ENTMCNC: 31 G/DL (ref 32–36)
MCV RBC AUTO: 90 FL (ref 82–98)
MONOCYTES # BLD AUTO: 0.5 K/UL (ref 0.3–1)
MONOCYTES NFR BLD: 5.6 % (ref 4–15)
NEUTROPHILS # BLD AUTO: 6.3 K/UL (ref 1.8–7.7)
NEUTROPHILS NFR BLD: 70.8 % (ref 38–73)
NITRITE UR QL STRIP: NEGATIVE
NRBC BLD-RTO: 0 /100 WBC
PH UR STRIP: 5 [PH] (ref 5–8)
PLATELET # BLD AUTO: 296 K/UL (ref 150–450)
PMV BLD AUTO: 8.8 FL (ref 9.2–12.9)
POTASSIUM SERPL-SCNC: 4.6 MMOL/L (ref 3.5–5.1)
PROT SERPL-MCNC: 7.5 G/DL (ref 6–8.4)
PROT UR QL STRIP: NEGATIVE
RBC # BLD AUTO: 4.22 M/UL (ref 4–5.4)
SARS-COV-2 RDRP RESP QL NAA+PROBE: NEGATIVE
SODIUM SERPL-SCNC: 135 MMOL/L (ref 136–145)
SP GR UR STRIP: 1.01 (ref 1–1.03)
URN SPEC COLLECT METH UR: ABNORMAL
WBC # BLD AUTO: 8.86 K/UL (ref 3.9–12.7)

## 2023-03-15 PROCEDURE — 96361 HYDRATE IV INFUSION ADD-ON: CPT

## 2023-03-15 PROCEDURE — 99284 EMERGENCY DEPT VISIT MOD MDM: CPT | Mod: CS,,, | Performed by: EMERGENCY MEDICINE

## 2023-03-15 PROCEDURE — 96374 THER/PROPH/DIAG INJ IV PUSH: CPT

## 2023-03-15 PROCEDURE — U0002 COVID-19 LAB TEST NON-CDC: HCPCS | Performed by: EMERGENCY MEDICINE

## 2023-03-15 PROCEDURE — 63600175 PHARM REV CODE 636 W HCPCS: Performed by: EMERGENCY MEDICINE

## 2023-03-15 PROCEDURE — 85025 COMPLETE CBC W/AUTO DIFF WBC: CPT | Performed by: EMERGENCY MEDICINE

## 2023-03-15 PROCEDURE — 93010 EKG 12-LEAD: ICD-10-PCS | Mod: ,,, | Performed by: INTERNAL MEDICINE

## 2023-03-15 PROCEDURE — 93010 ELECTROCARDIOGRAM REPORT: CPT | Mod: ,,, | Performed by: INTERNAL MEDICINE

## 2023-03-15 PROCEDURE — 80053 COMPREHEN METABOLIC PANEL: CPT | Performed by: EMERGENCY MEDICINE

## 2023-03-15 PROCEDURE — 99284 EMERGENCY DEPT VISIT MOD MDM: CPT

## 2023-03-15 PROCEDURE — 25000003 PHARM REV CODE 250: Performed by: EMERGENCY MEDICINE

## 2023-03-15 PROCEDURE — 81025 URINE PREGNANCY TEST: CPT | Performed by: EMERGENCY MEDICINE

## 2023-03-15 PROCEDURE — 83735 ASSAY OF MAGNESIUM: CPT | Performed by: EMERGENCY MEDICINE

## 2023-03-15 PROCEDURE — 81003 URINALYSIS AUTO W/O SCOPE: CPT | Performed by: EMERGENCY MEDICINE

## 2023-03-15 PROCEDURE — 99284 PR EMERGENCY DEPT VISIT,LEVEL IV: ICD-10-PCS | Mod: CS,,, | Performed by: EMERGENCY MEDICINE

## 2023-03-15 PROCEDURE — 93005 ELECTROCARDIOGRAM TRACING: CPT

## 2023-03-15 RX ORDER — KETOROLAC TROMETHAMINE 30 MG/ML
10 INJECTION, SOLUTION INTRAMUSCULAR; INTRAVENOUS
Status: COMPLETED | OUTPATIENT
Start: 2023-03-15 | End: 2023-03-15

## 2023-03-15 RX ORDER — OLMESARTAN MEDOXOMIL AND HYDROCHLOROTHIAZIDE 40/12.5 40; 12.5 MG/1; MG/1
1 TABLET ORAL DAILY
COMMUNITY
End: 2023-09-07 | Stop reason: ALTCHOICE

## 2023-03-15 RX ORDER — BUPROPION HYDROCHLORIDE 150 MG/1
150 TABLET ORAL DAILY
COMMUNITY
End: 2023-07-12 | Stop reason: SDUPTHER

## 2023-03-15 RX ORDER — ONDANSETRON 4 MG/1
8 TABLET, ORALLY DISINTEGRATING ORAL 2 TIMES DAILY
COMMUNITY

## 2023-03-15 RX ORDER — HYDROCODONE BITARTRATE AND ACETAMINOPHEN 5; 325 MG/1; MG/1
1 TABLET ORAL EVERY 6 HOURS PRN
COMMUNITY

## 2023-03-15 RX ADMIN — KETOROLAC TROMETHAMINE 10 MG: 30 INJECTION, SOLUTION INTRAMUSCULAR; INTRAVENOUS at 12:03

## 2023-03-15 RX ADMIN — SODIUM CHLORIDE 1000 ML: 9 INJECTION, SOLUTION INTRAVENOUS at 12:03

## 2023-03-15 NOTE — ED NOTES
Patient states headache on set Monday, states pain to frontal head, reports had gastric sleeve Friday, pain worse at night when lying down. TYlenol at 0600

## 2023-03-15 NOTE — ED NOTES
Patient identifiers verified and correct for  MS Claude  C/C:  Headache SEE NN  APPEARANCE: awake and alert in NAD. PAIN  4/10  SKIN: warm, dry and intact. No breakdown or bruising.  MUSCULOSKELETAL: Patient moving all extremities spontaneously, no obvious swelling or deformities noted. Ambulates independently.  RESPIRATORY: Denies shortness of breath.Respirations unlabored.   CARDIAC: Denies CP, 2+ distal pulses; no peripheral edema  ABDOMEN: S/ND/NT, Denies nausea  : voids spontaneously, denies difficulty  Neurologic: AAO x 4; follows commands equal strength in all extremities; denies numbness/tingling. Denies dizziness  Felipe new weakness, reports frontal headache, deneis blurred vision

## 2023-03-15 NOTE — DISCHARGE INSTRUCTIONS
I recommend drinking a few ounces of juice a few times during the day to prevent low blood sugar.   Discuss with your doctor other measures he or she would recommend to prevent low blood sugar.  Try to drink the recommended amount of water/fluids to stay hydrated.    Seek immediate medical attention if you have new or worsening symptoms, or for any other concern.

## 2023-03-15 NOTE — ED PROVIDER NOTES
Encounter Date: 3/15/2023    SCRIBE #1 NOTE: ITressa, am scribing for, and in the presence of,  Keli Carrera MD. I have scribed the following portions of the note - the EKG reading. Other sections scribed: HPI, PE.     History     Chief Complaint   Patient presents with    Headache     HA since monday     Time patient was seen by the provider: 11:52 AM      The patient is a 52 y.o. female with past medical history of HTN (on Amlodipine) who presents to the ED with a complaint of frontal headache onset 2 days ago. Her headache migrates depending on the position of her head. The patient has a history of intermittent headaches. However, she states that her current headache is not similar to her previous headaches. She describes her current headache as band-like across her frontal lobe and worse while lying flat. The patient was able to fall asleep after taking Norco, however, she states that her symptoms returned overnight. Her headache gradually worsened over the past few days. Therefore, she came to the ED. The patient endorses chills and photophobia, but denies sensitivity to sound. She had a gastric sleeve 4 days ago and was given nausea medication. The patient was also placed on a clear liquid diet and told to drink at 24-30 oz of water. She did not have a bowel movement since the procedure, but notes that her baseline is every 3 days. Patient did not take stool softener. She took Pantoprazole this morning. Patient denies fever, congestion, sneezing, nausea, vomiting, diarrhea, abdominal pain, distention, and dysuria.    The history is provided by the patient and medical records. No  was used.   Review of patient's allergies indicates:   Allergen Reactions    Codeine Hallucinations and Other (See Comments)     Other reaction(s): Other (See Comments)  Hallucinations       Past Medical History:   Diagnosis Date    Back pain     Depression     GERD (gastroesophageal reflux  disease)     Hypertension     Miscarriage      Past Surgical History:   Procedure Laterality Date    breast augmentation      DILATION AND CURETTAGE OF UTERUS      gastric sleeve      heel spurs  11/2018    TOTAL REDUCTION MAMMOPLASTY Bilateral 1994     Family History   Problem Relation Age of Onset    Colon cancer Paternal Grandfather     Diabetes Mother     Heart disease Father     Aortic aneurysm Brother     Cerebral aneurysm Maternal Aunt     Diabetes Maternal Grandfather      Social History     Tobacco Use    Smoking status: Never    Smokeless tobacco: Never   Substance Use Topics    Alcohol use: Yes     Comment: once a month    Drug use: Yes     Types: Marijuana     Comment: once a month         Physical Exam     Initial Vitals [03/15/23 1121]   BP Pulse Resp Temp SpO2   (!) 152/94 85 16 98.7 °F (37.1 °C) 98 %      MAP       --         Physical Exam    Nursing note and vitals reviewed.  Constitutional: She appears well-developed and well-nourished. She is not diaphoretic. No distress.   HENT:   Head: Normocephalic and atraumatic.   No temporal or sinus tenderness.   Eyes: Conjunctivae and EOM are normal. Pupils are equal, round, and reactive to light.   Neck: Neck supple.   Normal range of motion.  Cardiovascular:  Normal rate, regular rhythm and intact distal pulses.           Pulmonary/Chest: No respiratory distress.   Abdominal: She exhibits no distension.   Musculoskeletal:         General: Normal range of motion.      Cervical back: Normal range of motion and neck supple.     Neurological: She is alert and oriented to person, place, and time. GCS score is 15. GCS eye subscore is 4. GCS verbal subscore is 5. GCS motor subscore is 6.   Skin: Skin is warm and dry.   Psychiatric: She has a normal mood and affect. Her behavior is normal. Judgment and thought content normal.       ED Course   Procedures  Labs Reviewed   CBC W/ AUTO DIFFERENTIAL - Abnormal; Notable for the following components:       Result  Value    Hemoglobin 11.7 (*)     MCHC 31.0 (*)     MPV 8.8 (*)     All other components within normal limits   COMPREHENSIVE METABOLIC PANEL - Abnormal; Notable for the following components:    Sodium 135 (*)     CO2 22 (*)     Glucose 66 (*)     ALT 46 (*)     All other components within normal limits   URINALYSIS, REFLEX TO URINE CULTURE - Abnormal; Notable for the following components:    Appearance, UA Hazy (*)     Ketones, UA 3+ (*)     All other components within normal limits    Narrative:     Specimen Source->Urine   MAGNESIUM   SARS-COV-2 RNA AMPLIFICATION, QUAL   POCT URINE PREGNANCY     EKG Readings: (Independently Interpreted)   Rhythm: Normal Sinus Rhythm. Heart Rate: 81. Axis: Normal.   Done at 10:18 am.   T wave inversion in leads III.  Compared with EKG from 08/2/22. T wave inversion in lead III is new, but otherwise no other ischemic changes.   ECG Results              EKG 12-lead (Final result)  Result time 03/15/23 15:59:30      Final result by Interface, Lab In [facility] (03/15/23 15:59:30)                   Narrative:    Test Reason : R00.2,    Vent. Rate : 081 BPM     Atrial Rate : 081 BPM     P-R Int : 140 ms          QRS Dur : 088 ms      QT Int : 382 ms       P-R-T Axes : 042 -04 018 degrees     QTc Int : 443 ms    Normal sinus rhythm  Normal ECG  When compared with ECG of 02-AUG-2022 21:05,  No significant change was found  Confirmed by Tae Lauren MD ([id number]) on 3/15/2023 3:59:15 PM    Referred By: [email or web address]   SELF           Confirmed By:Tae Lauren MD                                  Imaging Results    None          Medications   sodium chloride 0.9% bolus 1,000 mL 1,000 mL (0 mLs Intravenous Stopped 3/15/23 1442)   ketorolac injection 9.999 mg (9.999 mg Intravenous Given 3/15/23 1256)     Medical Decision Making:   History:   Old Medical Records: I decided to obtain old medical records.  Differential Diagnosis:   Anemia, electrolyte abnormalities, hypoglycemia, dehydration, tension HA,  GCA, Migraine HA, sinusitis  Independently Interpreted Test(s):   I have ordered and independently interpreted EKG Reading(s) - see prior notes  Clinical Tests:   Lab Tests: Ordered and Reviewed  ED Management:  Pt with recent gastric sleeve found to be hypoglycemic in our ED  She was given IV fluids and a juice and felt better  She has been on a liquid diet that has been sugar free for 4 days  She has no signs of a stroke and her HA was not sudden in onset  I think her symptoms are all related to dehydration and low blood glucose  She called her doctor to discuss adding something with sugar into her diet  She is able to drink and her blood glucose improved  She has access to a glucose monitor at home  Will return if symptoms return        Scribe Attestation:   Scribe #1: I performed the above scribed service and the documentation accurately describes the services I performed. I attest to the accuracy of the note.    Attending Attestation:           Physician Attestation for Scribe:      Comments: This documentation was written with the assistance of a scribe. I reviewed documentation as written by the scribe, and it is both accurate and complete.                       Clinical Impression:   Final diagnoses:  [E16.2] Hypoglycemia (Primary)  [E86.0] Dehydration  [R00.2] Palpitations        ED Disposition Condition    Discharge Stable          ED Prescriptions    None       Follow-up Information       Follow up With Specialties Details Why Contact Info    Tae Melgoza, DO Internal Medicine   [address]  [place] [address]  [phone]               Keli Carrera MD  03/19/23 3256

## 2023-04-24 ENCOUNTER — OFFICE VISIT (OUTPATIENT)
Dept: OBSTETRICS AND GYNECOLOGY | Facility: CLINIC | Age: 53
End: 2023-04-24
Payer: COMMERCIAL

## 2023-04-24 VITALS — WEIGHT: 193.5 LBS | SYSTOLIC BLOOD PRESSURE: 121 MMHG | BODY MASS INDEX: 34.28 KG/M2 | DIASTOLIC BLOOD PRESSURE: 84 MMHG

## 2023-04-24 DIAGNOSIS — Z12.31 SCREENING MAMMOGRAM FOR BREAST CANCER: ICD-10-CM

## 2023-04-24 DIAGNOSIS — Z01.419 ENCOUNTER FOR ANNUAL ROUTINE GYNECOLOGICAL EXAMINATION: Primary | ICD-10-CM

## 2023-04-24 PROCEDURE — 99999 PR PBB SHADOW E&M-EST. PATIENT-LVL III: ICD-10-PCS | Mod: PBBFAC,,, | Performed by: OBSTETRICS & GYNECOLOGY

## 2023-04-24 PROCEDURE — 99999 PR PBB SHADOW E&M-EST. PATIENT-LVL III: CPT | Mod: PBBFAC,,, | Performed by: OBSTETRICS & GYNECOLOGY

## 2023-04-24 PROCEDURE — 99396 PR PREVENTIVE VISIT,EST,40-64: ICD-10-PCS | Mod: S$GLB,,, | Performed by: OBSTETRICS & GYNECOLOGY

## 2023-04-24 PROCEDURE — 99396 PREV VISIT EST AGE 40-64: CPT | Mod: S$GLB,,, | Performed by: OBSTETRICS & GYNECOLOGY

## 2023-04-24 NOTE — PROGRESS NOTES
Chief Complaint   Patient presents with    Annual Exam       HISTORY OF PRESENT ILLNESS:   Nadine M Claude is a 52 y.o. female  who presents for well woman exam.  No LMP recorded..  She has no complaints. Cycles stopped 1 year ago.  Declines STD testing. Had gastric sleeve several weeks ago.      Past Medical History:   Diagnosis Date    Back pain     Depression     GERD (gastroesophageal reflux disease)     Hypertension     Miscarriage           Past Surgical History:   Procedure Laterality Date    breast augmentation      DILATION AND CURETTAGE OF UTERUS      gastric sleeve      heel spurs  2018    sleeve      TOTAL REDUCTION MAMMOPLASTY Bilateral 1994           Social History     Socioeconomic History    Marital status:    Tobacco Use    Smoking status: Never    Smokeless tobacco: Never   Substance and Sexual Activity    Alcohol use: Yes     Comment: once a month    Drug use: Yes     Types: Marijuana     Comment: once a month    Sexual activity: Yes     Partners: Male     Comment: marrie   Social History Narrative    Patient is  with 2 children. Does not exercise and eats unhealthy diet. She plans to start exercising at her gym.        Family History   Problem Relation Age of Onset    Colon cancer Paternal Grandfather     Diabetes Mother     Heart disease Father     Aortic aneurysm Brother     Cerebral aneurysm Maternal Aunt     Diabetes Maternal Grandfather            OB History    Para Term  AB Living   4 2 2   2     SAB IAB Ectopic Multiple Live Births   2              # Outcome Date GA Lbr Arthur/2nd Weight Sex Delivery Anes PTL Lv   4 Term            3 SAB            2 SAB            1 Term            dads mom with uterine ca     COMPREHENSIVE GYN HISTORY:  PAP History: Denies abnormal Paps  Infection History: Denies STDs. Denies PID.  Benign History: Denies uterine fibroids. Denies ovarian cysts. Denies endometriosis Denies other conditions.  Cancer History: Denies cervical  cancer. Denies uterine cancer or hyperplasia. Denies ovarian cancer. Denies vulvar cancer or pre-cancer. Denies vaginal cancer or pre-cancer. Denies breast cancer. Denies colon cancer.  Cycle: nl age.month/y  Menopause at 51      ROS:  Otherwise negative       /84   Wt 87.8 kg (193 lb 8 oz)   BMI 34.28 kg/m²   APPEARANCE: Well nourished, well developed, in no acute distress.  NECK: Neck symmetric   ABDOMEN: Soft. No tenderness or masses. No hernias. No hepatosplenomegaly.  BREASTS: Symmetrical, no skin changes or visible lesions. No palpable masses, nipple discharge or adenopathy bilaterally.  PELVIC:   VULVA: No lesions. Normal female genitalia.  URETHRAL MEATUS: Normal size and location, no lesions, no prolapse.  URETHRA: No masses, tenderness, prolapse or scarring.  VAGINA: atrophic, no discharge, no significant cystocele or rectocele.  CERVIX: No lesions and discharge.  UTERUS: Normal size, regular shape, mobile, non-tender, bladder base nontender.  ADNEXA: No masses or tenderness.    Data Reviewed:     Lipid profile: Date:   Lab Results   Component Value Date    CHOL 161 02/23/2021    CHOL 126 08/21/2017     Lab Results   Component Value Date    HDL 44 02/23/2021    HDL 34 (L) 08/21/2017     Lab Results   Component Value Date    LDLCALC 91.4 02/23/2021    LDLCALC 63.4 08/21/2017     Lab Results   Component Value Date    TRIG 128 02/23/2021    TRIG 143 08/21/2017     Lab Results   Component Value Date    CHOLHDL 27.3 02/23/2021    CHOLHDL 27.0 08/21/2017     Colonoscopy 2015, repeat 10 years    1. Encounter for annual routine gynecological examination    2. Screening mammogram for breast cancer          Plan:   1. Routine gyn annual exam. s/p normal breast exam and MMG ordered.  Pap with HPV cotesting up to date, next needed 2025. STD testing: declined. Colonoscopy up to date next needed 2025.       F/u in 1 yr or PRN

## 2023-07-10 NOTE — PROGRESS NOTES
INTERNAL MEDICINE CLINIC - SAME DAY APPOINTMENT  Progress Note    PRESENTING HISTORY     PCP: Tae Melgoza DO    Chief Complaint/Reason for Visit:   No chief complaint on file.    History of Present Illness & ROS : Ms. Nadine M Claude is a 52 y.o. female.    Same day apt   New to me.  Has several complaints today:   ` Fatigue for several months. Does not feel like 'sleeping enough and going to bed too late'; has diagnosed BRYCE: not been wearing the machine due to the recent wt loss surgery and don't feel needs it.   No chest pain, dizziness, SOB, or occassional headaches but resolves with Excedrin.   Had Wt loss surgery on 3/10/2023 and has been 'more tired since the surgery'. Taking Vitamin B12 as of 2 weeks ago. Started taking 'thought may help'.   ` Hair Loss: noticed as of 2 weeks; starting to take supplements with Biotin. 'Going to have markel placed'; declines, when offered, any special Rx shampoos that may be of some help; offered Derm referral and encouraged to keep if labs are non revealing for possible 'cause'.   ` request refills on Buspar (clarified with her that it is the XL and not the SR)  ` no longer taking any of her Blood pressure medications; not monitoring BP at home; states, 'it drops my blood pressure too much'  ` Boil for the past 2 days to left buttock; applying toothpaste to the site, 'painful'.     Review of Systems:  Eyes: denies visual changes at this time denies floaters   ENT: no nasal congestion or sore throat  Respiratory: no cough or shorness of breath  Cardiovascular: no chest pain or palpitations  Gastrointestinal: no nausea or vomiting, no abdominal pain or change in bowel habits  Genitourinary: no hematuria or dysuria; denies frequency  Hematologic/Lymphatic: no easy bruising or lymphadenopathy  Musculoskeletal: no arthralgias or myalgias  Neurological: no seizures or tremors  Endocrine: no heat or cold intolerance      PAST HISTORY:     Past Medical History:   Diagnosis Date     Back pain     Depression     GERD (gastroesophageal reflux disease)     Hypertension     Miscarriage        Past Surgical History:   Procedure Laterality Date    breast augmentation      DILATION AND CURETTAGE OF UTERUS      gastric sleeve      heel spurs  11/2018    sleeve      TOTAL REDUCTION MAMMOPLASTY Bilateral 1994       Family History   Problem Relation Age of Onset    Colon cancer Paternal Grandfather     Diabetes Mother     Heart disease Father     Aortic aneurysm Brother     Cerebral aneurysm Maternal Aunt     Diabetes Maternal Grandfather        Social History     Socioeconomic History    Marital status:    Tobacco Use    Smoking status: Never    Smokeless tobacco: Never   Substance and Sexual Activity    Alcohol use: Yes     Comment: once a month    Drug use: Yes     Types: Marijuana     Comment: once a month    Sexual activity: Yes     Partners: Male     Comment: marrie   Social History Narrative    Patient is  with 2 children. Does not exercise and eats unhealthy diet. She plans to start exercising at her gym.        MEDICATIONS & ALLERGIES:     Current Outpatient Medications on File Prior to Visit   Medication Sig Dispense Refill    amLODIPine (NORVASC) 5 MG tablet Take 1 tablet (5 mg total) by mouth once daily. 30 tablet 11    buPROPion (WELLBUTRIN SR) 150 MG TBSR 12 hr tablet Take 1 tablet (150 mg total) by mouth 2 (two) times daily. 60 tablet 2    buPROPion (WELLBUTRIN XL) 150 MG TB24 tablet Take 150 mg by mouth once daily.      butalbital-acetaminophen-caff -40 mg -40 mg Cap Take 1 capsule by mouth 3 (three) times daily as needed (for headache not to exceed 6 capsules per day). 18 capsule 0    furosemide (LASIX) 20 MG tablet Take 1 tablet (20 mg total) by mouth daily as needed (leg swelling). 60 tablet 3    gabapentin (NEURONTIN) 300 MG capsule Take 300 mg by mouth 2 (two) times daily.      HYDROcodone-acetaminophen (NORCO) 5-325 mg per tablet Take 1 tablet by mouth  every 6 (six) hours as needed for Pain.      levalbuterol (XOPENEX) 1.25 mg/3 mL nebulizer solution Take 3 mLs (1.25 mg total) by nebulization every 6 (six) hours as needed for Shortness of Breath. Rescue 1 each 10    LIDOcaine (LIDODERM) 5 % Place 1 patch onto the skin once daily. Remove & Discard patch within 12 hours or as directed by MD 15 patch 0    olmesartan (BENICAR) 40 MG tablet Take 1 tablet (40 mg total) by mouth once daily. 90 tablet 3    olmesartan-hydrochlorothiazide (BENICAR HCT) 40-12.5 mg Tab Take 1 tablet by mouth once daily.      ondansetron (ZOFRAN-ODT) 4 MG TbDL Take 8 mg by mouth 2 (two) times daily.      pantoprazole (PROTONIX) 40 MG tablet Take 1 tablet (40 mg total) by mouth once daily. 30 tablet 2     No current facility-administered medications on file prior to visit.        Review of patient's allergies indicates:   Allergen Reactions    Codeine Hallucinations and Other (See Comments)     Other reaction(s): Other (See Comments)  Hallucinations         Medications Reconciliation:   I have reconciled the patient's home medications with the patient/family. I have updated all changes.  Refer to After-Visit Medication List.    OBJECTIVE:     Vital Signs:  There were no vitals filed for this visit.  Wt Readings from Last 3 Encounters:   04/24/23 0930 87.8 kg (193 lb 8 oz)   04/17/23 0909 89.4 kg (197 lb 3.2 oz)   03/15/23 1121 99.8 kg (220 lb)     There is no height or weight on file to calculate BMI.   Wt Readings from Last 3 Encounters:   07/12/23 81.2 kg (179 lb 0.2 oz)   04/24/23 87.8 kg (193 lb 8 oz)   04/17/23 89.4 kg (197 lb 3.2 oz)     Temp Readings from Last 3 Encounters:   03/15/23 98.4 °F (36.9 °C)   02/22/23 98.6 °F (37 °C)   01/21/23 98.1 °F (36.7 °C) (Oral)     BP Readings from Last 3 Encounters:   07/12/23 118/72   04/24/23 121/84   04/17/23 133/88     Pulse Readings from Last 3 Encounters:   07/12/23 68   03/15/23 66   02/22/23 86       Physical Exam:  (Focused Exam,)  General:  Well developed, well nourished. No distress.  HEENT: Head is normocephalic, atraumatic;  Eyes: Clear conjunctiva.  Neck: Supple, symmetrical neck; trachea midline.  Lungs: Clear to auscultation bilaterally and normal respiratory effort.  Cardiovascular: Heart with regular rate and rhythm. No murmurs, gallops or rubs  Extremities: No LE edema. Pulses 2+ and symmetric.   Skin: Skin color, texture, turgor normal. No rashes.  Left Buttock: ~ dime size raised with mild surrounding erythema to boil site; no active drainage.   Musculoskeletal: Normal gait.   Neurologic:  No focal numbness or weakness.       Laboratory  Lab Results   Component Value Date    WBC 8.86 03/15/2023    HGB 11.7 (L) 03/15/2023    HCT 37.8 03/15/2023     03/15/2023    CHOL 161 02/23/2021    TRIG 128 02/23/2021    HDL 44 02/23/2021    ALT 46 (H) 03/15/2023    AST 32 03/15/2023     (L) 03/15/2023    K 4.6 03/15/2023     03/15/2023    CREATININE 0.7 03/15/2023    BUN 10 03/15/2023    CO2 22 (L) 03/15/2023    TSH 2.474 08/26/2021    INR 0.9 01/19/2021     ASSESSMENT & PLAN:     Same day apt    Fatigue, unspecified type  -     TSH; Future; Expected date: 07/12/2023  -     Comprehensive Metabolic Panel; Future; Expected date: 07/12/2023  -     CBC Auto Differential; Future; Expected date: 07/12/2023  -     IRON AND TIBC; Future; Expected date: 07/12/2023  -     FERRITIN; Future; Expected date: 07/12/2023  -     VITAMIN B12; Future; Expected date: 07/12/2023  -     Urinalysis; Future; Expected date: 07/12/2023  -     Urine culture; Future; Expected date: 07/12/2023    Hair loss  -     TSH; Future; Expected date: 07/12/2023  -     IRON AND TIBC; Future; Expected date: 07/12/2023  -     FERRITIN; Future; Expected date: 07/12/2023  -     Ambulatory referral/consult to Dermatology; Future; Expected date: 07/19/2023      Encounter for medication refill  Depression, unspecified depression type  -     buPROPion (WELLBUTRIN XL) 150 MG TB24  tablet; Take 1 tablet (150 mg total) by mouth once daily.  Dispense: 30 tablet; Refill: 1    BRYCE:   *Not been wearing the machine due to the recent wt loss surgery and don't feel needs it. Declines referral to Sleep Disorder to be retested     Boil of buttock  -     CBC Auto Differential; Future; Expected date: 07/12/2023  -     doxycycline (VIBRA-TABS) 100 MG tablet; Take 1 tablet (100 mg total) by mouth 2 (two) times daily. for 10 days  Dispense: 20 tablet; Refill: 0  -     mupirocin (BACTROBAN) 2 % ointment; Apply topically every 8 (eight) hours.  Dispense: 30 g; Refill: 1  -     fluconazole (DIFLUCAN) 150 MG Tab; Take 1 tab by mouth now, then repeat again in 2 days.  Dispense: 2 tablet; Refill: 0 (for reported concerns with possible antibiotic yeast)    *Same Day apt. Encouraged that she gets in with a new Primary Care Physician at this time as I suspect she will need continued follow ups.     Future Appointments   Date Time Provider Department Center   11/17/2023  1:30 PM Danny Hernandez MD [facility]        Medication List            Accurate as of July 12, 2023  3:40 PM. If you have any questions, ask your nurse or doctor.                START taking these medications      doxycycline 100 MG tablet  Commonly known as: VIBRA-TABS  Take 1 tablet (100 mg total) by mouth 2 (two) times daily. for 10 days  Started by: ABNER Palacios     fluconazole 150 MG Tab  Commonly known as: DIFLUCAN  Take 1 tab by mouth now, then repeat again in 2 days.  Started by: ABNER Palacios     mupirocin 2 % ointment  Commonly known as: BACTROBAN  Apply topically every 8 (eight) hours.  Started by: ABNER Palacios            CHANGE how you take these medications      buPROPion 150 MG TB24 tablet  Commonly known as: WELLBUTRIN XL  Take 1 tablet (150 mg total) by mouth once daily.  What changed: Another medication with the same name was removed. Continue taking this medication, and follow  the directions you see here.  Changed by: ABNER Palacios            CONTINUE taking these medications      amLODIPine 5 MG tablet  Commonly known as: NORVASC  Take 1 tablet (5 mg total) by mouth once daily.     butalbital-acetaminophen-caff -40 mg -40 mg Cap  Take 1 capsule by mouth 3 (three) times daily as needed (for headache not to exceed 6 capsules per day).     gabapentin 300 MG capsule  Commonly known as: NEURONTIN     HYDROcodone-acetaminophen 5-325 mg per tablet  Commonly known as: NORCO     levalbuterol 1.25 mg/3 mL nebulizer solution  Commonly known as: XOPENEX  Take 3 mLs (1.25 mg total) by nebulization every 6 (six) hours as needed for Shortness of Breath. Rescue     LIDOcaine 5 %  Commonly known as: LIDODERM  Place 1 patch onto the skin once daily. Remove & Discard patch within 12 hours or as directed by MD     olmesartan 40 MG tablet  Commonly known as: BENICAR  Take 1 tablet (40 mg total) by mouth once daily.     olmesartan-hydrochlorothiazide 40-12.5 mg Tab  Commonly known as: BENICAR HCT     ondansetron 4 MG Tbdl  Commonly known as: ZOFRAN-ODT     pantoprazole 40 MG tablet  Commonly known as: PROTONIX  Take 1 tablet (40 mg total) by mouth once daily.            STOP taking these medications      furosemide 20 MG tablet  Commonly known as: LASIX  Stopped by: ABNER Palacios               Where to Get Your Medications        These medications were sent to [id number] - [place], [place] - [address] AT [facility] [address]  [address], [facility] [address]      Phone: [phone]   buPROPion 150 MG TB24 tablet  doxycycline 100 MG tablet  fluconazole 150 MG Tab  mupirocin 2 % ointment       Signing Physician:  ABNER Palacios

## 2023-07-12 ENCOUNTER — OFFICE VISIT (OUTPATIENT)
Dept: INTERNAL MEDICINE | Facility: CLINIC | Age: 53
End: 2023-07-12
Payer: COMMERCIAL

## 2023-07-12 VITALS
HEIGHT: 63 IN | BODY MASS INDEX: 31.71 KG/M2 | WEIGHT: 179 LBS | DIASTOLIC BLOOD PRESSURE: 72 MMHG | SYSTOLIC BLOOD PRESSURE: 118 MMHG | OXYGEN SATURATION: 99 % | HEART RATE: 68 BPM

## 2023-07-12 DIAGNOSIS — R53.83 FATIGUE, UNSPECIFIED TYPE: Primary | ICD-10-CM

## 2023-07-12 DIAGNOSIS — G47.33 OSA (OBSTRUCTIVE SLEEP APNEA): ICD-10-CM

## 2023-07-12 DIAGNOSIS — F32.A DEPRESSION, UNSPECIFIED DEPRESSION TYPE: ICD-10-CM

## 2023-07-12 DIAGNOSIS — L02.32 BOIL OF BUTTOCK: ICD-10-CM

## 2023-07-12 DIAGNOSIS — Z76.0 ENCOUNTER FOR MEDICATION REFILL: ICD-10-CM

## 2023-07-12 DIAGNOSIS — L65.9 HAIR LOSS: ICD-10-CM

## 2023-07-12 PROCEDURE — 99999 PR PBB SHADOW E&M-EST. PATIENT-LVL V: ICD-10-PCS | Mod: PBBFAC,,, | Performed by: NURSE PRACTITIONER

## 2023-07-12 PROCEDURE — 99214 OFFICE O/P EST MOD 30 MIN: CPT | Mod: S$GLB,,, | Performed by: NURSE PRACTITIONER

## 2023-07-12 PROCEDURE — 99999 PR PBB SHADOW E&M-EST. PATIENT-LVL V: CPT | Mod: PBBFAC,,, | Performed by: NURSE PRACTITIONER

## 2023-07-12 PROCEDURE — 99214 PR OFFICE/OUTPT VISIT, EST, LEVL IV, 30-39 MIN: ICD-10-PCS | Mod: S$GLB,,, | Performed by: NURSE PRACTITIONER

## 2023-07-12 RX ORDER — FLUCONAZOLE 150 MG/1
TABLET ORAL
Qty: 2 TABLET | Refills: 0 | Status: SHIPPED | OUTPATIENT
Start: 2023-07-12

## 2023-07-12 RX ORDER — BUPROPION HYDROCHLORIDE 150 MG/1
150 TABLET ORAL DAILY
Qty: 30 TABLET | Refills: 1 | Status: SHIPPED | OUTPATIENT
Start: 2023-07-12 | End: 2023-09-07 | Stop reason: SDUPTHER

## 2023-07-12 RX ORDER — DOXYCYCLINE HYCLATE 100 MG
100 TABLET ORAL 2 TIMES DAILY
Qty: 20 TABLET | Refills: 0 | Status: SHIPPED | OUTPATIENT
Start: 2023-07-12 | End: 2023-07-22

## 2023-07-12 RX ORDER — MUPIROCIN 20 MG/G
OINTMENT TOPICAL EVERY 8 HOURS
Qty: 30 G | Refills: 1 | Status: SHIPPED | OUTPATIENT
Start: 2023-07-12

## 2023-07-13 ENCOUNTER — LAB VISIT (OUTPATIENT)
Dept: LAB | Facility: HOSPITAL | Age: 53
End: 2023-07-13
Payer: COMMERCIAL

## 2023-07-13 DIAGNOSIS — R53.83 FATIGUE, UNSPECIFIED TYPE: ICD-10-CM

## 2023-07-13 DIAGNOSIS — R53.83 FATIGUE, UNSPECIFIED TYPE: Primary | ICD-10-CM

## 2023-07-13 DIAGNOSIS — L65.9 HAIR LOSS: ICD-10-CM

## 2023-07-13 LAB
ALBUMIN SERPL BCP-MCNC: 3.9 G/DL (ref 3.5–5.2)
ALP SERPL-CCNC: 73 U/L (ref 55–135)
ALT SERPL W/O P-5'-P-CCNC: 23 U/L (ref 10–44)
ANION GAP SERPL CALC-SCNC: 9 MMOL/L (ref 8–16)
AST SERPL-CCNC: 19 U/L (ref 10–40)
BASOPHILS # BLD AUTO: 0.02 K/UL (ref 0–0.2)
BASOPHILS NFR BLD: 0.3 % (ref 0–1.9)
BILIRUB SERPL-MCNC: 0.4 MG/DL (ref 0.1–1)
BUN SERPL-MCNC: 11 MG/DL (ref 6–20)
CALCIUM SERPL-MCNC: 9.8 MG/DL (ref 8.7–10.5)
CHLORIDE SERPL-SCNC: 104 MMOL/L (ref 95–110)
CO2 SERPL-SCNC: 29 MMOL/L (ref 23–29)
CREAT SERPL-MCNC: 0.7 MG/DL (ref 0.5–1.4)
DIFFERENTIAL METHOD: ABNORMAL
EOSINOPHIL # BLD AUTO: 0.1 K/UL (ref 0–0.5)
EOSINOPHIL NFR BLD: 2.1 % (ref 0–8)
ERYTHROCYTE [DISTWIDTH] IN BLOOD BY AUTOMATED COUNT: 15.3 % (ref 11.5–14.5)
EST. GFR  (NO RACE VARIABLE): >60 ML/MIN/1.73 M^2
FERRITIN SERPL-MCNC: 77 NG/ML (ref 20–300)
GLUCOSE SERPL-MCNC: 91 MG/DL (ref 70–110)
HCT VFR BLD AUTO: 39.2 % (ref 37–48.5)
HGB BLD-MCNC: 12.2 G/DL (ref 12–16)
IMM GRANULOCYTES # BLD AUTO: 0.01 K/UL (ref 0–0.04)
IMM GRANULOCYTES NFR BLD AUTO: 0.2 % (ref 0–0.5)
IRON SERPL-MCNC: 102 UG/DL (ref 30–160)
LYMPHOCYTES # BLD AUTO: 2.1 K/UL (ref 1–4.8)
LYMPHOCYTES NFR BLD: 34.6 % (ref 18–48)
MCH RBC QN AUTO: 29.3 PG (ref 27–31)
MCHC RBC AUTO-ENTMCNC: 31.1 G/DL (ref 32–36)
MCV RBC AUTO: 94 FL (ref 82–98)
MONOCYTES # BLD AUTO: 0.5 K/UL (ref 0.3–1)
MONOCYTES NFR BLD: 7.6 % (ref 4–15)
NEUTROPHILS # BLD AUTO: 3.4 K/UL (ref 1.8–7.7)
NEUTROPHILS NFR BLD: 55.2 % (ref 38–73)
NRBC BLD-RTO: 0 /100 WBC
PLATELET # BLD AUTO: 338 K/UL (ref 150–450)
PMV BLD AUTO: 9.4 FL (ref 9.2–12.9)
POTASSIUM SERPL-SCNC: 3.9 MMOL/L (ref 3.5–5.1)
PROT SERPL-MCNC: 7.1 G/DL (ref 6–8.4)
RBC # BLD AUTO: 4.16 M/UL (ref 4–5.4)
SATURATED IRON: 27 % (ref 20–50)
SODIUM SERPL-SCNC: 142 MMOL/L (ref 136–145)
TOTAL IRON BINDING CAPACITY: 371 UG/DL (ref 250–450)
TRANSFERRIN SERPL-MCNC: 251 MG/DL (ref 200–375)
TSH SERPL DL<=0.005 MIU/L-ACNC: 1.22 UIU/ML (ref 0.4–4)
VIT B12 SERPL-MCNC: >2000 PG/ML (ref 210–950)
WBC # BLD AUTO: 6.07 K/UL (ref 3.9–12.7)

## 2023-07-13 PROCEDURE — 36415 COLL VENOUS BLD VENIPUNCTURE: CPT | Performed by: NURSE PRACTITIONER

## 2023-07-13 PROCEDURE — 82607 VITAMIN B-12: CPT | Performed by: NURSE PRACTITIONER

## 2023-07-13 PROCEDURE — 82728 ASSAY OF FERRITIN: CPT | Performed by: NURSE PRACTITIONER

## 2023-07-13 PROCEDURE — 80053 COMPREHEN METABOLIC PANEL: CPT | Performed by: NURSE PRACTITIONER

## 2023-07-13 PROCEDURE — 84466 ASSAY OF TRANSFERRIN: CPT | Performed by: NURSE PRACTITIONER

## 2023-07-13 PROCEDURE — 85025 COMPLETE CBC W/AUTO DIFF WBC: CPT | Performed by: NURSE PRACTITIONER

## 2023-07-13 PROCEDURE — 84443 ASSAY THYROID STIM HORMONE: CPT | Performed by: NURSE PRACTITIONER

## 2023-07-14 ENCOUNTER — TELEPHONE (OUTPATIENT)
Dept: INTERNAL MEDICINE | Facility: CLINIC | Age: 53
End: 2023-07-14
Payer: COMMERCIAL

## 2023-07-14 DIAGNOSIS — G47.33 OSA (OBSTRUCTIVE SLEEP APNEA): Primary | ICD-10-CM

## 2023-07-14 DIAGNOSIS — G47.19 EXCESSIVE DAYTIME SLEEPINESS: ICD-10-CM

## 2023-07-14 NOTE — TELEPHONE ENCOUNTER
"Notified pt of message below from Provider, pt stated she would cut back and I repeated the provider said STOP the B12 now. Explained her results are dbl the norm and should not risk damage to her body.  She agreed to Stop the B12.      ----- Message from ABNER James sent at 7/14/2023  6:52 AM CDT -----  Can you please call her with the following; no active portal:   ` B12 level is elevated and if taking any supplements with B12, needs to stop those  ` no indication of anemia  ` no Iron Deficiency or thyroid imbalance: all normal acceptable levels   ` no electrolyte imbalance, liver or kidney abnormalities; no indication of 'dehydration'    #Labs and urine are non-revealing as to 'cause' for her symptoms of fatigue and hair loss.   #Recommend being evaluated by Dermatology for the 'hair loss" (referral was placed when seen), and evaluated by Sleep Disorder once again for the 'increase symptoms of daytime sleepiness' to ascertain not 'sleep apnea' as cause. Referral has been placed.     Thanks   "

## 2023-09-07 ENCOUNTER — OFFICE VISIT (OUTPATIENT)
Dept: INTERNAL MEDICINE | Facility: CLINIC | Age: 53
End: 2023-09-07
Payer: COMMERCIAL

## 2023-09-07 VITALS
HEIGHT: 63 IN | BODY MASS INDEX: 29.88 KG/M2 | RESPIRATION RATE: 16 BRPM | DIASTOLIC BLOOD PRESSURE: 82 MMHG | OXYGEN SATURATION: 96 % | WEIGHT: 168.63 LBS | SYSTOLIC BLOOD PRESSURE: 104 MMHG | HEART RATE: 98 BPM

## 2023-09-07 DIAGNOSIS — I10 ESSENTIAL HYPERTENSION: ICD-10-CM

## 2023-09-07 DIAGNOSIS — R53.83 FATIGUE, UNSPECIFIED TYPE: ICD-10-CM

## 2023-09-07 DIAGNOSIS — M25.552 LEFT HIP PAIN: ICD-10-CM

## 2023-09-07 DIAGNOSIS — K21.9 GASTROESOPHAGEAL REFLUX DISEASE, UNSPECIFIED WHETHER ESOPHAGITIS PRESENT: ICD-10-CM

## 2023-09-07 PROCEDURE — 99999 PR PBB SHADOW E&M-EST. PATIENT-LVL III: CPT | Mod: PBBFAC,,, | Performed by: STUDENT IN AN ORGANIZED HEALTH CARE EDUCATION/TRAINING PROGRAM

## 2023-09-07 PROCEDURE — 99213 OFFICE O/P EST LOW 20 MIN: CPT | Mod: S$GLB,,, | Performed by: STUDENT IN AN ORGANIZED HEALTH CARE EDUCATION/TRAINING PROGRAM

## 2023-09-07 PROCEDURE — 99213 PR OFFICE/OUTPT VISIT, EST, LEVL III, 20-29 MIN: ICD-10-PCS | Mod: S$GLB,,, | Performed by: STUDENT IN AN ORGANIZED HEALTH CARE EDUCATION/TRAINING PROGRAM

## 2023-09-07 PROCEDURE — 99999 PR PBB SHADOW E&M-EST. PATIENT-LVL III: ICD-10-PCS | Mod: PBBFAC,,, | Performed by: STUDENT IN AN ORGANIZED HEALTH CARE EDUCATION/TRAINING PROGRAM

## 2023-09-07 RX ORDER — PANTOPRAZOLE SODIUM 40 MG/1
40 TABLET, DELAYED RELEASE ORAL DAILY
Qty: 90 TABLET | Refills: 0 | Status: SHIPPED | OUTPATIENT
Start: 2023-09-07 | End: 2023-12-06

## 2023-09-07 RX ORDER — BUPROPION HYDROCHLORIDE 150 MG/1
150 TABLET ORAL DAILY
Qty: 90 TABLET | Refills: 0 | Status: SHIPPED | OUTPATIENT
Start: 2023-09-07 | End: 2023-12-06

## 2023-09-07 RX ORDER — MELOXICAM 15 MG/1
15 TABLET ORAL DAILY
Qty: 30 TABLET | Refills: 1 | Status: SHIPPED | OUTPATIENT
Start: 2023-09-07 | End: 2023-11-06

## 2023-09-07 RX ORDER — LIDOCAINE 50 MG/G
1 PATCH TOPICAL DAILY
Qty: 30 PATCH | Refills: 0 | Status: SHIPPED | OUTPATIENT
Start: 2023-09-07

## 2023-09-07 NOTE — ASSESSMENT & PLAN NOTE
Has mild degenerative changes on xray. 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 Follows with ortho. Will prescribe lidocaine patches and meloxicam. Asked pt to go back to ibuprofen or aleve If meloxicam does not help.

## 2023-09-07 NOTE — PROGRESS NOTES
Subjective     Patient ID: Nadine M Claude is a 53 y.o. female.    Chief Complaint: Hip Pain and Fatigue    Hip Pain     Fatigue  Associated symptoms include fatigue. Pertinent negatives include no abdominal pain, chest pain, chills, coughing, fever, headaches or sore throat.     Patient is a 52 yo female with known left hip arthritis is here for left hip pain which is worse than usual. She sees ortho. She tried aleve for pain. Never tried meloxicam. She is wondering if lidocaine patches would help.   -also experiencing fatigue since her gastric bypass. B12, hb and iron nml. She only gets 5.5 hrs of sleep. Asked her to sleep more.   Review of Systems   Constitutional:  Positive for fatigue. Negative for chills and fever.   HENT:  Negative for rhinorrhea and sore throat.    Respiratory:  Negative for cough, chest tightness and shortness of breath.    Cardiovascular:  Negative for chest pain.   Gastrointestinal:  Negative for abdominal pain and blood in stool.   Genitourinary:  Negative for dysuria and hematuria.   Neurological:  Negative for dizziness, light-headedness and headaches.          Objective     Physical Exam       Assessment and Plan     1. Fatigue, unspecified type  Assessment & Plan:  Labs are all good. B12, iron levels and hb wnl. Likely due to patient not getting enough sleep. Asked her to sleep at least 7hrs at night. Another reason could be from BP being low. Stopped antihypertensives      2. Left hip pain  Assessment & Plan:  Has mild degenerative changes on xray. 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 Follows with ortho. Will prescribe lidocaine patches and meloxicam. Asked pt to go back to ibuprofen or aleve If meloxicam does not help.    Orders:  -     LIDOcaine (LIDODERM) 5 %; Place 1 patch onto the skin once daily.  Dispense: 30 patch; Refill: 0    3. Gastroesophageal reflux disease, unspecified whether esophagitis present  -     pantoprazole (PROTONIX) 40 MG tablet; Take 1 tablet (40 mg total)  by mouth once daily.  Dispense: 90 tablet; Refill: 0    4. Essential hypertension  Assessment & Plan:  BP low since losing weight from gastric sleeve. Only take the olmesartan-hctz and amlo twice a week. Asked pt to stop those medicines and check BP. If BP >130, asked her to restart either amlodipine or the olmesartan on its own      Other orders  -     meloxicam (MOBIC) 15 MG tablet; Take 1 tablet (15 mg total) by mouth once daily.  Dispense: 30 tablet; Refill: 1  -     buPROPion (WELLBUTRIN XL) 150 MG TB24 tablet; Take 1 tablet (150 mg total) by mouth once daily.  Dispense: 90 tablet; Refill: 0

## 2023-09-07 NOTE — ASSESSMENT & PLAN NOTE
BP low since losing weight from gastric sleeve. Only take the olmesartan-hctz and amlo twice a week. Asked pt to stop those medicines and check BP. If BP >130, asked her to restart either amlodipine or the olmesartan on its own

## 2023-09-07 NOTE — ASSESSMENT & PLAN NOTE
Labs are all good. B12, iron levels and hb wnl. Likely due to patient not getting enough sleep. Asked her to sleep at least 7hrs at night. Another reason could be from BP being low. Stopped antihypertensives

## 2023-09-07 NOTE — LETTER
September 7, 2023    Nadine M Claude  [address] [facility] [address]             [facility]  [address]  [facility] [address]  Phone: [phone]  Fax: [phone] To whomever it may concern,    Ms. Nadine Claude saw me in clinic today. Please excuse her from work on 9/7/2023.       If you have any questions or concerns, please don't hesitate to call.    Sincerely,        Nusrat Smith MD

## 2024-04-19 ENCOUNTER — OFFICE VISIT (OUTPATIENT)
Dept: INTERNAL MEDICINE | Facility: CLINIC | Age: 54
End: 2024-04-19
Payer: COMMERCIAL

## 2024-04-19 VITALS
HEART RATE: 70 BPM | HEIGHT: 63 IN | OXYGEN SATURATION: 98 % | DIASTOLIC BLOOD PRESSURE: 80 MMHG | BODY MASS INDEX: 30.51 KG/M2 | WEIGHT: 172.19 LBS | SYSTOLIC BLOOD PRESSURE: 116 MMHG

## 2024-04-19 DIAGNOSIS — I10 ESSENTIAL HYPERTENSION: Primary | ICD-10-CM

## 2024-04-19 DIAGNOSIS — F33.1 MODERATE EPISODE OF RECURRENT MAJOR DEPRESSIVE DISORDER: ICD-10-CM

## 2024-04-19 DIAGNOSIS — M25.552 LEFT HIP PAIN: ICD-10-CM

## 2024-04-19 DIAGNOSIS — K21.9 GASTROESOPHAGEAL REFLUX DISEASE, UNSPECIFIED WHETHER ESOPHAGITIS PRESENT: ICD-10-CM

## 2024-04-19 DIAGNOSIS — J45.20 MILD INTERMITTENT ASTHMA WITHOUT COMPLICATION: ICD-10-CM

## 2024-04-19 PROCEDURE — 99999 PR PBB SHADOW E&M-EST. PATIENT-LVL IV: CPT | Mod: PBBFAC,,, | Performed by: STUDENT IN AN ORGANIZED HEALTH CARE EDUCATION/TRAINING PROGRAM

## 2024-04-19 PROCEDURE — 99215 OFFICE O/P EST HI 40 MIN: CPT | Mod: S$GLB,,, | Performed by: STUDENT IN AN ORGANIZED HEALTH CARE EDUCATION/TRAINING PROGRAM

## 2024-04-19 RX ORDER — ALLOPURINOL 100 MG/1
100 TABLET ORAL DAILY
COMMUNITY

## 2024-04-19 RX ORDER — PANTOPRAZOLE SODIUM 40 MG/1
40 TABLET, DELAYED RELEASE ORAL DAILY
Qty: 90 TABLET | Refills: 3 | Status: SHIPPED | OUTPATIENT
Start: 2024-04-19 | End: 2025-04-14

## 2024-04-19 RX ORDER — BUPROPION HYDROCHLORIDE 150 MG/1
150 TABLET ORAL DAILY
Qty: 90 TABLET | Refills: 3 | Status: SHIPPED | OUTPATIENT
Start: 2024-04-19 | End: 2025-04-14

## 2024-04-19 RX ORDER — OLMESARTAN MEDOXOMIL 40 MG/1
40 TABLET ORAL DAILY
Qty: 90 TABLET | Refills: 3 | Status: SHIPPED | OUTPATIENT
Start: 2024-04-19 | End: 2024-04-19

## 2024-04-19 RX ORDER — OLMESARTAN MEDOXOMIL AND HYDROCHLOROTHIAZIDE 40/12.5 40; 12.5 MG/1; MG/1
1 TABLET ORAL DAILY
Qty: 90 TABLET | Refills: 3 | Status: SHIPPED | OUTPATIENT
Start: 2024-04-19 | End: 2025-04-19

## 2024-04-19 RX ORDER — ALBUTEROL SULFATE 90 UG/1
2 AEROSOL, METERED RESPIRATORY (INHALATION) EVERY 6 HOURS PRN
Qty: 18 G | Refills: 3 | Status: SHIPPED | OUTPATIENT
Start: 2024-04-19 | End: 2025-04-19

## 2024-04-19 RX ORDER — BUPROPION HYDROCHLORIDE 150 MG/1
150 TABLET ORAL
Qty: 90 TABLET | Refills: 0 | OUTPATIENT
Start: 2024-04-19

## 2024-04-19 RX ORDER — MELOXICAM 15 MG/1
15 TABLET ORAL DAILY
COMMUNITY
Start: 2023-09-07

## 2024-04-19 RX ORDER — TIRZEPATIDE 7.5 MG/.5ML
7.5 INJECTION, SOLUTION SUBCUTANEOUS
COMMUNITY
Start: 2024-03-13

## 2024-04-19 NOTE — PROGRESS NOTES
"[facility] SAME DAY VISIT      CHIEF COMPLAINT:   Chief Complaint   Patient presents with    Hip Pain     Pt states she's been having hip pain for a while. She states she already sees a doctor for her hip. She states she's also been having high blood pressure.       HISTORY OF PRESENT ILLNESS: Nadine M Claude is a 53 y.o. female who presents here today for the following:    Hypertension  BP is 116/80 today. Home BP checks: range 117/71 to 138/120(?) with most values 125-130/90s. Current regimen: olmesartan 40 mg daily, with which patient has been mostly compliant - reports missing dose about once per week. She is also prescribed amlodipine 5 mg but takes this only "ever now and then." Patient does not adhere to low sodium diet. Patient denies tobacco use, drug use, excessive alcohol use, excessive caffeine use (1 cup daily usually). Patient denies symptoms including double vision, chest pain, difficulty breathing, dizziness, or lightheadedness. She has noticed some increased swelling in hands/fingers and feet. This seems to worsen if she skips dose of olmesartan. Previously she was on combined olmesartan-hctz and liked this medication better - felt BP was more stable.     Hip pain  Patient is following with outside specialist and getting an injection next month, also contemplating hip replacement surgery. She wants to know what else she can do in the meantime for pain. She has not seen PT. Patient currently takes 15 mg Meloxicam daily and Tramadol 50 mg nightly PRN (just started). She was also on Gabapentin but did not find this helpful.    Medication refill  Blood pressure medication as above  Bupropion - for depression which works well for her but she ran out a month ago and has had recurrent symptoms  Pantoprazole - for GERD  Albuterol inhaler - for asthma        REVIEW OF SYSTEMS:    ROS as in HPI.      MEDICAL HISTORY:    Past Medical History:   Diagnosis Date    Back pain     Depression     GERD " "(gastroesophageal reflux disease)     Hypertension     Miscarriage        MEDICATIONS:    Current Outpatient Medications on File Prior to Visit   Medication Sig Dispense Refill    butalbital-acetaminophen-caff -40 mg -40 mg Cap Take 1 capsule by mouth 3 (three) times daily as needed (for headache not to exceed 6 capsules per day). 18 capsule 0    fluconazole (DIFLUCAN) 150 MG Tab Take 1 tab by mouth now, then repeat again in 2 days. 2 tablet 0    gabapentin (NEURONTIN) 300 MG capsule Take 300 mg by mouth 2 (two) times daily.      HYDROcodone-acetaminophen (NORCO) 5-325 mg per tablet Take 1 tablet by mouth every 6 (six) hours as needed for Pain.      LIDOcaine (LIDODERM) 5 % Place 1 patch onto the skin once daily. 30 patch 0    mupirocin (BACTROBAN) 2 % ointment Apply topically every 8 (eight) hours. 30 g 1    ondansetron (ZOFRAN-ODT) 4 MG TbDL Take 8 mg by mouth 2 (two) times daily.      amLODIPine (NORVASC) 5 MG tablet Take 1 tablet (5 mg total) by mouth once daily. 30 tablet 11    buPROPion (WELLBUTRIN XL) 150 MG TB24 tablet Take 1 tablet (150 mg total) by mouth once daily. 90 tablet 0    levalbuterol (XOPENEX) 1.25 mg/3 mL nebulizer solution Take 3 mLs (1.25 mg total) by nebulization every 6 (six) hours as needed for Shortness of Breath. Rescue 1 each 10    olmesartan (BENICAR) 40 MG tablet Take 1 tablet (40 mg total) by mouth once daily. 90 tablet 3    pantoprazole (PROTONIX) 40 MG tablet Take 1 tablet (40 mg total) by mouth once daily. 90 tablet 0     No current facility-administered medications on file prior to visit.         PHYSICAL EXAM:    /80 (BP Location: Right arm, Patient Position: Sitting)   Pulse 70   Ht 5' 3" (1.6 m)   Wt 78.1 kg (172 lb 2.9 oz)   SpO2 98%   BMI 30.50 kg/m²     Physical Exam  Vitals and nursing note reviewed.   Constitutional:       General: She is not in acute distress.     Appearance: Normal appearance. She is not ill-appearing, toxic-appearing or " diaphoretic.   HENT:      Head: Normocephalic and atraumatic.      Nose: Nose normal.   Eyes:      Extraocular Movements: Extraocular movements intact.      Conjunctiva/sclera: Conjunctivae normal.      Pupils: Pupils are equal, round, and reactive to light.   Cardiovascular:      Rate and Rhythm: Normal rate and regular rhythm.      Heart sounds: Normal heart sounds. No murmur heard.  Pulmonary:      Effort: Pulmonary effort is normal. No respiratory distress.      Breath sounds: Normal breath sounds. No wheezing.   Musculoskeletal:         General: No deformity. Normal range of motion.   Skin:     Findings: No lesion or rash.   Neurological:      General: No focal deficit present.      Mental Status: She is alert.      Motor: No weakness.      Gait: Gait normal.   Psychiatric:         Mood and Affect: Mood normal.         Behavior: Behavior normal.         Thought Content: Thought content normal.         Judgment: Judgment normal.           ASSESSMENT & PLAN:    Araceli was seen today for hip pain.    Diagnoses and all orders for this visit:    Essential hypertension  BP in goal today at 116/80 however patient reports labile values at home with systolic ranging from 125-130 typically, and diastolic usually in 90's but as high as 110's. Asymptomatic. Patient does reports some swelling in lower legs and hands/fingers.  Current regimen: olmesartan 40 mg + amlodipine 5 mg - patient is compliant with olmesartan but only takes amlodipine intermittently   Discontinue amlodipine given possibility of associated peripheral edema  Transition back to olmesartan-hctz as patient tolerated this well previously and is now >1 year out from gastric sleeve surgery with no dehydration concerns.   Discussed low sodium diet, healthy eating, regular exercise, continued weight loss, avoidance of smoking, avoidance of excessive alcohol  -     olmesartan-hydrochlorothiazide (BENICAR HCT) 40-12.5 mg Tab; Take 1 tablet by mouth once  daily.    Left hip pain  Continue F/U with outside specialist for steroid injections and possible surgical intervention  Continue Meloxicam daily + Tramadol PRN as prescribed by previous provider.   Referral to PT placed.   -     Ambulatory referral/consult to Physical/Occupational Therapy; Future    Moderate episode of recurrent major depressive disorder  Patient ran out of Wellbutrin ~1 month ago and has had recurrent depressive symptoms. No SI/HI.  Wellbutrin refilled.   -     buPROPion (WELLBUTRIN XL) 150 MG TB24 tablet; Take 1 tablet (150 mg total) by mouth once daily.    Mild intermittent asthma without complication  Controlled with PRN rescue inhaler however patient is out - refilled today  -     albuterol (PROAIR HFA) 90 mcg/actuation inhaler; Inhale 2 puffs into the lungs every 6 (six) hours as needed for Wheezing. Rescue    Gastroesophageal reflux disease, unspecified whether esophagitis present  Controlled with Pantoprazole - refilled today  -     pantoprazole (PROTONIX) 40 MG tablet; Take 1 tablet (40 mg total) by mouth once daily.      I spent a total of 41 minutes on the day of the visit.  This includes face to face time and non-face to face time preparing to see the patient (eg, review of tests), obtaining and/or reviewing separately obtained history, documenting clinical information in the electronic or other health record, independently interpreting results and communicating results to the patient/family/caregiver, or care coordinator.          Soraya Wood MD  [facility]

## 2024-04-19 NOTE — LETTER
April 19, 2024    Nadine M Claude  [address] [facility] [address]         [facility]  [address]  [facility] [address]  Phone: [phone]  Fax: [phone] April 19, 2024     Patient: Nadine M Claude   YOB: 1970   Date of Visit: 4/19/2024       To Whom It May Concern:    The above individual was evaluated in my office on 04/19/2024 and is excused from work.      If you have any questions or concerns, please don't hesitate to call.    Sincerely,        Soraya Wood MD     
same name as above

## 2024-06-18 ENCOUNTER — CLINICAL SUPPORT (OUTPATIENT)
Dept: REHABILITATION | Facility: HOSPITAL | Age: 54
End: 2024-06-18
Attending: STUDENT IN AN ORGANIZED HEALTH CARE EDUCATION/TRAINING PROGRAM
Payer: COMMERCIAL

## 2024-06-18 DIAGNOSIS — M25.552 LEFT HIP PAIN: ICD-10-CM

## 2024-06-18 PROCEDURE — 97161 PT EVAL LOW COMPLEX 20 MIN: CPT | Mod: PO

## 2024-06-18 PROCEDURE — 97110 THERAPEUTIC EXERCISES: CPT | Mod: PO

## 2024-06-18 NOTE — PLAN OF CARE
[facility]   Physical Therapy Initial Evaluation      Name: Nadine M Claude  [facility] Number: [id number]    Therapy Diagnosis:   Encounter Diagnosis   Name Primary?    Left hip pain         Physician: Soraya Wood MD    Physician Orders: PT Eval and Treat   Medical Diagnosis from Referral: M25.552 (ICD-10-CM) - Left hip pain  Evaluation Date: 6/18/2024  Authorization Period Expiration: 12/18/2024  Plan of Care Expiration: 9/18/2024  Progress Note Due: 7/18/2024  Date of Surgery: n/a  Visit # / Visits authorized: 1/ pending   FOTO: 1/ 3    Precautions: Standard     Time In: 1108 am  Time Out: 1140  Total Billable Time: 32 minutes    Subjective     Date of onset: over the past year as she lost weight     History of current condition - Araceli reports: she lost around 70 lbs and started to have sharpness occurring in the left hip , she also has right knee issues. The left hip is scheduled for replacement in August 27th so she is here to try and get the hip as strong as she can before surgery. She received an injection on May 25th with min improvement. She has trouble going up steps and stairs and needs to go up on the right leg. Sitting long periods of time and standing causes a lot of pain. First getting up in the morning also bothers the leg. Walking tends to help as she feels like it keeps things loose. She can still perform all ADLs but takes more time. She works for the [employer] service and they are working with her so that they do not overstress her leg.     Falls: none    Imaging: xrays: @[facility]    Prior Therapy: none for the hip   Social History: 2 level home with bonus room upstairs that she does not need to go to , no steps to enter   Occupation:   Prior Level of Function: indep  Current Level of Function: indep but takes more time    Pain:  Current 5/10, worst 8/10, best 5/10   Location: left hip and down the left posterior leg    Description:  everything at different times  Aggravating Factors: Sitting, Standing, Morning, and Lifting  Easing Factors: heating pad and rest    Patients goals: get stronger for surgery, walking better, get up stairs better      Medical History:   Past Medical History:   Diagnosis Date    Back pain     Depression     GERD (gastroesophageal reflux disease)     Hypertension     Miscarriage        Surgical History:   Nadine M Claude  has a past surgical history that includes Dilation and curettage of uterus; breast augmentation; heel spurs (11/2018); Total Reduction Mammoplasty (Bilateral, 1994); gastric sleeve; and sleeve.    Medications:   Araceli has a current medication list which includes the following prescription(s): albuterol, allopurinol, bupropion, meloxicam, olmesartan-hydrochlorothiazide, pantoprazole, and zepbound.    Allergies:   Review of patient's allergies indicates:   Allergen Reactions    Codeine Hallucinations and Other (See Comments)     Other reaction(s): Other (See Comments)  Hallucinations          Objective        Observation: amb into clinic without AD    Posture: min slumped posture    Gait: severe bilateral valgus - fairly even bilaterally but worse on the left ( right knee pain present also)     Hip ROM: (measured in degrees)    LLE   Flexion 95   Abduction 35   Extension 0   ER 30   IR 20     Sensation: Intact    Lower Extremity Strength: (graded 1-5 out of 5)    RLE LLE   Hip flexion: 4+/5 3-/5   Hip ER 4/5 3+/5   Hip IR 4-/5 3+/5   Knee extension: 4+/5 4/5   Knee flexion: 4+/5 4/5   Posterior fibers of Gluteus medius 4-/5 3+/5   Hip extension: 4+/5 4-/5     Special Tests: ((+): pos.; (-): neg.)    RLE LLE   Nikolai Test  Hip flex tightness   FADIR Test  positive   RAMAN Test  positive   Scour Test  Unable secondary to pain   SLR  Unable secondary to pain     Joint Mobility: (graded 0-6 out of 6) moderate pain with lateral and inferior glides with mod muscle guarding    Palpation: mod tenderness over  lateral hip complex, hip flexors and ITB bilatearlly    Edema: none at this time            Intake Outcome Measure for FOTO  Survey    Therapist reviewed FOTO scores for Nadine M Claude on 6/18/2024.   FOTO report - see Media section or FOTO account episode details.    Intake Score: 37 %         Treatment     Total Treatment time (time-based codes) separate from Evaluation: 10 minutes     Araceli received the treatments listed below:      therapeutic exercises to develop strength, endurance, ROM, flexibility, posture, and core stabilization for 10 minutes including:  Education ( see below)  Hip adduction Iso  Clamshell  Quadset with towel under the knee          Patient Education and Home Exercises     Education provided:   - educated on POC and home exercise program importnace  -prehab for good surgicl outcomes    Written Home Exercises Provided: yes. Exercises were reviewed and Araceli was able to demonstrate them prior to the end of the session.  Araceli demonstrated good  understanding of the education provided. See EMR under Patient Instructions for exercises provided during therapy sessions.    Assessment     Araceli is a 53 y.o. female referred to outpatient Physical Therapy with a medical diagnosis of M25.552 (ICD-10-CM) - Left hip pain. Patient presents with left hip arthritic changes that are causing difficulty with standing, walking and performing ADLs. She is scheduled for a hip replacement in late August and we will work to get her as strong as she can get before surgery to have an easier post operative experience. She is motivated to get better and improve her functional activity and maintain her independence.     Patient prognosis is Fair.   Patient will benefit from skilled outpatient Physical Therapy to address the deficits stated above and in the chart below, provide patient /family education, and to maximize patientt's level of independence.     Plan of care discussed with patient: Yes  Patient's  spiritual, cultural and educational needs considered and patient is agreeable to the plan of care and goals as stated below:     Anticipated Barriers for therapy: chronicity of condition, bilateral knee issues    Medical Necessity is demonstrated by the following  History  Co-morbidities and personal factors that may impact the plan of care [x] LOW: no personal factors / co-morbidities  [] MODERATE: 1-2 personal factors / co-morbidities  [] HIGH: 3+ personal factors / co-morbidities    Moderate / High Support Documentation:   Co-morbidities affecting plan of care: see chart    Personal Factors:   no deficits     Examination  Body Structures and Functions, activity limitations and participation restrictions that may impact the plan of care [x] LOW: addressing 1-2 elements  [] MODERATE: 3+ elements  [] HIGH: 4+ elements (please support below)    Moderate / High Support Documentation: see chart     Clinical Presentation [x] LOW: stable  [] MODERATE: Evolving  [] HIGH: Unstable     Decision Making/ Complexity Score: low       Goals:  Short Term Goals: 4 weeks   10 degree hip range of motion improvement  1/2 grade MMT improvement bilaterally  Patient will be able to ambulate > 15 minutes before sitting secondary to pain  Patient will be compliant with home exercise program at all times    Long Term Goals: 8 weeks   Patient will be able to climb 1 flight of stairs with  good control  Patient will be able to walk  > 30 minutes before onset of pain  Patient will report < 3/10 pain level with physical activity  50% FOTO score improvement  Plan     Plan of care Certification: 6/18/2024 to 9/18/2024.    Outpatient Physical Therapy 1-2 times weekly for 6 weeks to include the following interventions: Gait Training, Manual Therapy, Moist Heat/ Ice, Neuromuscular Re-ed, Patient Education, Self Care, Therapeutic Activities, and Therapeutic Exercise.     Mckenna Murguia PT        Physician's Signature:  _________________________________________ Date: ________________

## 2024-07-25 ENCOUNTER — PATIENT MESSAGE (OUTPATIENT)
Dept: PREADMISSION TESTING | Facility: OTHER | Age: 54
End: 2024-07-25
Payer: COMMERCIAL

## 2024-07-30 ENCOUNTER — PATIENT MESSAGE (OUTPATIENT)
Dept: REHABILITATION | Facility: HOSPITAL | Age: 54
End: 2024-07-30

## 2025-02-17 NOTE — PROCEDURES
"The sleep study that you ordered is complete.  You have ordered sleep LAB services to perform the sleep study for Nadine M Claude.     Please find Sleep Study result in  the "Media tab" of Chart Review menu.         You can look  for the report in the  Media as a procedure document type.    As the ordering provider, you are responsible for reviewing the results and implementing a treatment plan with your patient.     If you need a Sleep Medicine provider to explain the sleep study findings and arrange treatment for the patient, please refer patient for consultation to our Sleep Clinic via [facility] with Ambulatory Consult Sleep.     To do that please place an order for an  "Ambulatory Consult Sleep" - it will go to our clinic work queue for our Medical Assistant to contact the patient for an appointment.     For any questions, please contact our clinic staff at [phone] to talk to clinical staff.     "
Nephrology